# Patient Record
Sex: FEMALE | Race: WHITE | Employment: FULL TIME | URBAN - METROPOLITAN AREA
[De-identification: names, ages, dates, MRNs, and addresses within clinical notes are randomized per-mention and may not be internally consistent; named-entity substitution may affect disease eponyms.]

---

## 2017-03-14 ENCOUNTER — OFFICE VISIT (OUTPATIENT)
Dept: FAMILY MEDICINE CLINIC | Age: 57
End: 2017-03-14

## 2017-03-14 VITALS
DIASTOLIC BLOOD PRESSURE: 74 MMHG | OXYGEN SATURATION: 98 % | HEIGHT: 61 IN | RESPIRATION RATE: 20 BRPM | BODY MASS INDEX: 45.42 KG/M2 | HEART RATE: 84 BPM | TEMPERATURE: 99.2 F | WEIGHT: 240.6 LBS | SYSTOLIC BLOOD PRESSURE: 118 MMHG

## 2017-03-14 DIAGNOSIS — I10 ESSENTIAL HYPERTENSION WITH GOAL BLOOD PRESSURE LESS THAN 130/80: ICD-10-CM

## 2017-03-14 DIAGNOSIS — E78.2 MIXED HYPERLIPIDEMIA: ICD-10-CM

## 2017-03-14 DIAGNOSIS — L40.9 PSORIASIS: ICD-10-CM

## 2017-03-14 DIAGNOSIS — K21.9 GASTROESOPHAGEAL REFLUX DISEASE, ESOPHAGITIS PRESENCE NOT SPECIFIED: Chronic | ICD-10-CM

## 2017-03-14 DIAGNOSIS — L71.9 ROSACEA: ICD-10-CM

## 2017-03-14 DIAGNOSIS — Z79.899 ENCOUNTER FOR LONG-TERM (CURRENT) USE OF HIGH-RISK MEDICATION: ICD-10-CM

## 2017-03-14 DIAGNOSIS — E03.4 HYPOTHYROIDISM DUE TO ACQUIRED ATROPHY OF THYROID: Chronic | ICD-10-CM

## 2017-03-14 DIAGNOSIS — C50.211 BREAST CANCER OF UPPER-INNER QUADRANT OF RIGHT FEMALE BREAST (HCC): ICD-10-CM

## 2017-03-14 DIAGNOSIS — C50.211 BREAST CANCER OF UPPER-INNER QUADRANT OF RIGHT FEMALE BREAST (HCC): Primary | ICD-10-CM

## 2017-03-14 DIAGNOSIS — Z12.11 COLON CANCER SCREENING: ICD-10-CM

## 2017-03-14 DIAGNOSIS — G56.02 CARPAL TUNNEL SYNDROME OF LEFT WRIST: ICD-10-CM

## 2017-03-14 DIAGNOSIS — Z00.00 WELL WOMAN EXAM WITHOUT GYNECOLOGICAL EXAM: ICD-10-CM

## 2017-03-14 DIAGNOSIS — I10 ESSENTIAL HYPERTENSION: Chronic | ICD-10-CM

## 2017-03-14 RX ORDER — AMLODIPINE BESYLATE 10 MG/1
TABLET ORAL
Qty: 90 TAB | Refills: 1 | Status: SHIPPED | OUTPATIENT
Start: 2017-03-14 | End: 2017-09-05 | Stop reason: SDUPTHER

## 2017-03-14 RX ORDER — DOXYCYCLINE 40 MG/1
40 CAPSULE ORAL
Qty: 90 CAP | Refills: 1 | Status: SHIPPED | OUTPATIENT
Start: 2017-03-14 | End: 2017-03-14 | Stop reason: ALTCHOICE

## 2017-03-14 RX ORDER — LETROZOLE 2.5 MG/1
2.5 TABLET, FILM COATED ORAL DAILY
Qty: 90 TAB | Refills: 0 | Status: SHIPPED | OUTPATIENT
Start: 2017-03-14 | End: 2017-03-15 | Stop reason: SDUPTHER

## 2017-03-14 RX ORDER — TRIAMCINOLONE ACETONIDE 1 MG/G
CREAM TOPICAL 2 TIMES DAILY
Qty: 15 G | Refills: 0 | Status: SHIPPED | OUTPATIENT
Start: 2017-03-14 | End: 2017-09-10

## 2017-03-14 RX ORDER — LISINOPRIL AND HYDROCHLOROTHIAZIDE 12.5; 2 MG/1; MG/1
TABLET ORAL
Qty: 180 TAB | Refills: 1 | Status: SHIPPED | OUTPATIENT
Start: 2017-03-14 | End: 2017-09-05 | Stop reason: SDUPTHER

## 2017-03-14 RX ORDER — LEVOTHYROXINE SODIUM 25 UG/1
25 TABLET ORAL
Qty: 90 TAB | Refills: 1 | Status: SHIPPED | OUTPATIENT
Start: 2017-03-14 | End: 2017-09-05 | Stop reason: SDUPTHER

## 2017-03-14 RX ORDER — ATORVASTATIN CALCIUM 10 MG/1
TABLET, FILM COATED ORAL
Qty: 90 TAB | Refills: 1 | Status: SHIPPED | OUTPATIENT
Start: 2017-03-14 | End: 2017-09-05 | Stop reason: SDUPTHER

## 2017-03-14 RX ORDER — OMEPRAZOLE 20 MG/1
CAPSULE, DELAYED RELEASE ORAL
Qty: 90 CAP | Refills: 0 | Status: SHIPPED | OUTPATIENT
Start: 2017-03-14 | End: 2017-09-07 | Stop reason: SDUPTHER

## 2017-03-14 NOTE — PROGRESS NOTES
Subjective:     Chief Complaint   Patient presents with    Hypertension     6 month follow-up      She  is a 49 y.o. female who presents for evaluation of:     HTN & HLD - Doing well on current meds with no medication side effects noted. BPs at home are well controlled. No TIA's, no chest pain on exertion, no dyspnea on exertion, no swelling of ankles. Exercising - walking  Dieting - No  Smoking - No     Lab Results   Component Value Date/Time    Cholesterol, total 280 04/18/2016 12:13 PM    HDL Cholesterol 45 04/18/2016 12:13 PM    LDL, calculated 155 04/18/2016 12:13 PM    Triglyceride 399 04/18/2016 12:13 PM       Hypothyroidism - No sx. On very low dose of Synthroid. Working on weight loss. ROS:  Constitutional: negative except for fevers, chills and fatigue  Eyes: negative for visual disturbance  Respiratory: negative for cough or dyspnea on exertion  Cardiovascular: negative for chest pain, dyspnea, palpitations, fatigue  Gastrointestinal: negative for nausea, vomiting, change in bowel habits, diarrhea and abdominal pain  Genitourinary:negative for frequency and dysuria  Integument/breast: Rosacea improved with Doxycycline  Musculoskeletal: hx of CTS in R hand and required sgy. Now having numbness in left hand that feels just like it did in the right hand. Worse with typing. Tried stretches. Declines steroid injection and would like to discuss sgy at this point. Neurological: negative for headaches and dizziness  H/O - Prev declined Breast and Gyn exam despite discussing importance of this. Found to have breast cancer shortly after this through [address] in  7/2015. Sgy with Dr. Romy Calle and Dr. Renita Rees. Saw Dr. Fausto Castañeda and put on Femara but she has not f/u since then. Never had colonoscopy & ct to declines. Sent to Gyn and had pap 2 yrs ago with [employer].     Behavioral/Psych: negative for anxiety and depression     Objective:     Vitals:    03/14/17 1722   BP: 118/74   Pulse: 84 Resp: 20   Temp: 99.2 °F (37.3 °C)   TempSrc: Oral   SpO2: 98%   Weight: 240 lb 9.6 oz (109.1 kg)   Height: 5' 1\" (1.549 m)     Physical Examination:  General appearance - alert, well appearing, and in no distress  Eyes -sclera anicteric  Mouth - mucous membranes moist, pharynx normal without lesions  Neck - supple, no significant adenopathy, no thyromegaly, no bruits  Chest - clear to auscultation, no wheezes, rales or rhonchi, symmetric air entry  Heart - normal rate, regular rhythm, normal S1, S2, no murmurs, rubs, clicks or gallops  Neurological - alert, oriented, normal speech, no focal findings or movement disorder noted, L hand with mildly + phalen's  Extr - no edema  Skin - erythematous rosacea appearing facial rash, psoriatic patch on RLE    Past Medical History:   Diagnosis Date    Acid reflux     Adverse effect of anesthesia     sister has a lot of allergies and issues with anesthesia per pt    BPPV (benign paroxysmal positional vertigo)     Breast cancer (HCC) 7/2014    right    Environmental allergies     Gallstones     Negative workup in 2009    GERD (gastroesophageal reflux disease)     HTN (hypertension) 2/24/2015    Hypertension 9/19/2013    Hypertension     Hypoactive thyroid     Hypothyroidism 2/24/2015    Mixed hyperlipidemia 5/12/2016    Morbid obesity ([address]     Prediabetes 5/12/2016     Past Surgical History:   Procedure Laterality Date    ABDOMEN SURGERY PROC UNLISTED      Cholecystectomy    HX BREAST RECONSTRUCTION Bilateral 9/24/2015    BREAST RECONSTRUCTION [facility] at [facility]    HX BREAST RECONSTRUCTION Right 11/10/2015    REMOVAL RIGHT BREAST TISSUE EXPANDER performed by Sujata Logan MD at [facility] MAIN OR    HX BREAST RECONSTRUCTION Right 2/19/2016    PLACEMENT RIGHT TISSUE EXPANDER  performed by [facility]jata Logan MD at [facility] MAIN OR    HX BREAST RECONSTRUCTION Bilateral 10/21/2016    EXCHANGE BILATERAL TISSUE EXPANDER FOR BREAST IMPLANT performed by Josy Hall MD at [address]    Vaginal deliveries    HX MASTECTOMY Bilateral 9/24/2015    BILATERAL BREAST SKIN SPARING MASTECTOMY, RIGHT SENTINEL NODE BIOPSY/BILATERAL BREAST RECONSTRUCTION W/TISSUE EXPANDERS AND ALLODERM performed by Krystal Cagle MD at [facility]ULATORY OR    IN ERCP REMOVE CALCULI/DEBRIS BILIARY/PANCREAS DUCT  3/25/2016         IN ERCP W/SPHINCTEROTOMY/PAPILLOTOMY  3/25/2016          Current Outpatient Prescriptions on File Prior to Visit   Medication Sig Dispense Refill    amLODIPine (NORVASC) 10 mg tablet take 1 tablet by mouth once daily for HYPERTENSION 90 Tab 1    atorvastatin (LIPITOR) 10 mg tablet TAKE 1 TABLET BY MOUTH NIGHTLY 90 Tab 1    letrozole (FEMARA) 2.5 mg tablet Take 1 Tab by mouth daily. 90 Tab 0    levothyroxine (SYNTHROID) 25 mcg tablet Take 1 Tab by mouth Daily (before breakfast). 90 Tab 1    lisinopril-hydroCHLOROthiazide (PRINZIDE, ZESTORETIC) 20-12.5 mg per tablet take 2 tablets by mouth daily 180 Tab 1    omeprazole (PRILOSEC) 20 mg capsule take 1 capsule by mouth once daily as needed 90 Cap 0    triamcinolone acetonide (KENALOG) 0.1 % topical cream Apply  to affected area two (2) times a day for 180 days. 15 g 0     No current facility-administered medications on file prior to visit. No Known Allergies    Assessment/ Plan:   Kareen Mueller was seen today for hypertension. Diagnoses and all orders for this visit:    Breast cancer of upper-inner quadrant of right female breast ([address] - ct current tx. Refilling Femara today and asked pt to check in with Dr. No Lou again soon.   Also getting DEXA again    Essential hypertension - well controlled    Hypothyroidism due to acquired atrophy of thyroid - controlled    Mixed hyperlipidemia - at last check, LDL ok but Total and TG too high    Gastroesophageal reflux disease, esophagitis presence not specified     Carpal tunnel syndrome of left wrist - hx of this in past in right side requiring sgy, would like to see Hand Surgery    Encounter for long-term (current) use of high-risk medication - labs, see orders section. Checking CBC, CMP, TSH, & lipid panel    Colon cancer screening - agrees to FIT testing though declines colonoscopy at this point    Rosacea - Oracea daily to help    Psoriasis - TAC cream PRN    Obtaining records for pap smear from [facility]    I spent > 50% of the 40 min visit counseling and educating about cancer care follow up, high risk medication monitoring, skin care, preventative screenings, and med compliance. I have discussed the diagnosis with the patient and the intended plan as seen in the above orders. The patient has received an after-visit summary and questions were answered concerning future plans. I have discussed medication side effects and warnings with the patient as well. Follow-up Disposition:  Return in about 6 months (around 9/14/2017), or if symptoms worsen or fail to improve.

## 2017-03-14 NOTE — PROGRESS NOTES
Chief Complaint   Patient presents with    Hypertension     6 month follow-up   Medication refill  Room 4

## 2017-03-15 DIAGNOSIS — C50.211 BREAST CANCER OF UPPER-INNER QUADRANT OF RIGHT FEMALE BREAST (HCC): ICD-10-CM

## 2017-03-15 RX ORDER — LETROZOLE 2.5 MG/1
2.5 TABLET, FILM COATED ORAL DAILY
Qty: 90 TAB | Refills: 0 | Status: SHIPPED | OUTPATIENT
Start: 2017-03-15 | End: 2018-10-12

## 2017-03-15 NOTE — TELEPHONE ENCOUNTER
PER REGIS from Dr. Fartun Montgomery LETROZOLE 2.5MG [facility]) ONE TAB ONCE A DAY QUANTITY 90 REFILL 3.

## 2017-09-07 DIAGNOSIS — Z00.00 WELL WOMAN EXAM WITHOUT GYNECOLOGICAL EXAM: ICD-10-CM

## 2017-09-09 RX ORDER — OMEPRAZOLE 20 MG/1
CAPSULE, DELAYED RELEASE ORAL
Qty: 90 CAP | Refills: 0 | Status: SHIPPED | OUTPATIENT
Start: 2017-09-09 | End: 2018-01-05 | Stop reason: SDUPTHER

## 2017-10-10 ENCOUNTER — OFFICE VISIT (OUTPATIENT)
Dept: SURGERY | Age: 57
End: 2017-10-10

## 2017-10-10 VITALS
TEMPERATURE: 99 F | WEIGHT: 243.5 LBS | SYSTOLIC BLOOD PRESSURE: 150 MMHG | OXYGEN SATURATION: 96 % | DIASTOLIC BLOOD PRESSURE: 90 MMHG | HEART RATE: 100 BPM | BODY MASS INDEX: 45.97 KG/M2 | HEIGHT: 61 IN

## 2017-10-10 DIAGNOSIS — I10 ESSENTIAL HYPERTENSION, MALIGNANT: ICD-10-CM

## 2017-10-10 DIAGNOSIS — E66.01 MORBID OBESITY (HCC): Primary | ICD-10-CM

## 2017-10-10 NOTE — PROGRESS NOTES
1. Have you been to the ER, urgent care clinic since your last visit? Hospitalized since your last visit?  no    2. Have you seen or consulted any other health care providers outside of the [address] [place] [address] since your last visit? Include any pap smears or colon screening. Jerilyn Nichols  Body composition    female  56 y.o. Vitals:    10/10/17 1558   BP: 150/90   Pulse: 100   Temp: 99 °F (37.2 °C)   SpO2: 96%   Weight: 243 lb 8 oz (110.5 kg)   Height: 5' 1\" (1.549 m)     Body mass index is 46.01 kg/(m^2). Tita Mnotalvo Neck-  17 inches  Waist- 49.5 inches  Hips- 55 inches  Frame size-  medium

## 2017-10-10 NOTE — PROGRESS NOTES
Bariatric Surgery Consult    Yolanda Nichols is a 56 y.o. female with a history of morbid obesity. Her Height: 5' 1\" (154.9 cm), Weight: 243 lb 8 oz (110.5 kg). Body mass index is 46.01 kg/(m^2). She reports that she has been trying to lose weight for 10 years. Her maximum weight was 243 pounds. She has attended our online bariatric surgery information seminar. Miriam Brady wants to consider laparoscopic sleeve gastrectomy. Pt is self-referred. Dietary History:   The patient says that in the past, unsupervised diets and medifast have not resulted in real success. She states she maintained 180 lbs for a long time because she was active. She then had her gallbladder removed and ate a lot more. When asked why she was not able to achieve or maintain significant weight loss she replied, \"The Medi fast tasted awful and was too expensive. My metabolism is too slow\". Number of meals per day: 3  Portion size: moderate  Snacks: 0 times. Other dietary indiscretions:   Fried food--2-3 times weekly, e.g. tacos. Fast food--5 times weekly, e.g. Taco bell, Mcdonalds. Drinks-- 10 glasses of wine weekly   Sweets-- 0    Comorbidities:     Bariatric comorbidities present: hypertension, hypercholesterolemia, GERD and weight related arthopathies (knees), cholelthiasis, breast cancer    GERD: takes Omeprazole daily for 3-5 years, she has no trouble laying down, she has no fluid in her mouth, no previous endoscopies    STOPBANG questionnaire     Do you Snore loudly? N  Do you often feel Tired, fatigued, or sleepy during the daytime? N  Has anyone Observed you stop breathing during your sleep? N  Are you being treated for high blood Pressure? Y  BMI more than 35 kg/m2? Y  Age over 48years old? Y  Neck Circumference >16 inches? Y  Gender male?  N  ______________________________________    SCORE: 4    If YES to 0 - 2, low risk of sleep apnea  If YES to 3 - 4  intermediate risk of having sleep apnea  If YES to 5 - 8  high risk of having sleep apnea (or 2 + BMI 35 or Neck > 17\" or Male)     Ambulatory status: independent    The patient's reported level of exercise: not active.     Past Medical History:   Diagnosis Date    Acid reflux     Adverse effect of anesthesia     sister has a lot of allergies and issues with anesthesia per pt    BPPV (benign paroxysmal positional vertigo)     Breast cancer ([facility] 7/2014    right    Environmental allergies     Gallstones     Negative workup in 2009    GERD (gastroesophageal reflux disease)     HTN (hypertension) 2/24/2015    Hypertension 9/19/2013    Hypertension     Hypoactive thyroid     Hypothyroidism 2/24/2015    Mixed hyperlipidemia 5/12/2016    Morbid obesity ([facility]     Prediabetes 5/12/2016      Past Surgical History:   Procedure Laterality Date    ABDOMEN SURGERY PROC UNLISTED      Cholecystectomy    HX BREAST RECONSTRUCTION Bilateral 9/24/2015    BREAST RECONSTRUCTION performed by Gaby Jimenes MD at [facility] HX BREAST RECONSTRUCTION Right 11/10/2015    REMOVAL RIGHT BREAST TISSUE EXPANDER performed by Gaby Jimenes MD at [facility]"" MAIN OR    HX BREAST RECONSTRUCTION Right 2/19/2016    PLACEMENT RIGHT TISSUE EXPANDER  performed by Gaby Jimenes MD at \[facility]"" MAIN OR    HX BREAST RECONSTRUCTION Bilateral 10/21/2016    EXCHANGE BILATERAL TISSUE EXPANDER FOR BREAST IMPLANT performed by [facility]y [facility]enes MD at [facility] MAIN OR    HX CHOLECYSTECTOMY      [address]    Vaginal deliveries    HX MASTECTOMY Bilateral 9/24/2015    BILATERAL BREAST SKIN SPARING MASTECTOMY, RIGHT SENTINEL NODE BIOPSY/BILATERAL BREAST RECONSTRUCTION W/TISSUE EXPANDERS AND ALLODERM performed by Chandler Vásquez MD at \[facility]"" AMBULATORY OR    ME ERCP REMOVE CALCULI/DEBRIS BILIARY/PANCREAS DUCT  3/25/2016         ME ERCP W/SPHINCTEROTOMY/PAPILLOTOMY  3/25/2016           Current Outpatient Prescriptions   Medication Sig    omeprazole (PRILOSEC) 20 mg capsule take 1 capsule by mouth once daily if needed    amLODIPine (NORVASC) 10 mg tablet take 1 tablet by mouth once daily    atorvastatin (LIPITOR) 10 mg tablet take 1 tablet by mouth at bedtime    levothyroxine (SYNTHROID) 25 mcg tablet take 1 tablet by mouth once daily (BEFORE BREAKFAST)    lisinopril-hydroCHLOROthiazide (PRINZIDE, ZESTORETIC) 20-12.5 mg per tablet take 2 tablets by mouth once daily    letrozole (FEMARA) 2.5 mg tablet Take 1 Tab by mouth daily. No current facility-administered medications for this visit.        No Known Allergies  Social History   Substance Use Topics    Smoking status: Former Smoker     Packs/day: 2.00     Years: 30.00     Types: Cigarettes     Quit date: 1/1/2013    Smokeless tobacco: Former User    Alcohol use 3.6 oz/week     6 Glasses of wine per week      Comment: 2 glasses wine per week      Family History   Problem Relation Age of Onset    Heart Disease Mother     Stroke Father     Asthma Sister     Hypertension Sister       Review of Systems:  A comprehensive review of 12 systems was negative except for:   Constitutional:   Eyes:   Ears, nose, mouth, throat, and face:   Respiratory:   Cardiovascular: high blood pressure  Gastrointestinal: acid indigestion or heartburn   Genitourinary:   Integument/breast:   Hematologic/lymphatic:   Musculoskeletal:   Neurological:   Behvioral/Psych:     Objective:     Visit Vitals    /90    Pulse 100    Temp 99 °F (37.2 °C)    Ht 5' 1\" (1.549 m)    Wt 243 lb 8 oz (110.5 kg)    SpO2 96%    BMI 46.01 kg/m2      Physical Exam:    General:  alert, cooperative, no distress, appears stated age   Throat & Neck: no erythema or exudates noted and neck supple and symmetrical; no palpable masses  [place] class 4    Lungs:   clear to auscultation bilaterally   Heart:  Regular rate and rhythm   Abdomen:   abdomen is soft without significant tenderness, masses, organomegaly or guarding, normal bowel sounds, obese   Extremities: extremities normal, atraumatic, no cyanosis or edema   Skin: Normal.   Neuro: Mental status: Alert, oriented, thought content appropriate  Gait: Normal   Lymphatic: no adenopathy cervical or supraclavicular          Assessment:     Encounter Diagnoses     ICD-10-CM ICD-9-CM   1. Morbid obesity ([facility] E66.01 278.01      1. Morbid obesity (Body mass index is 46.01 kg/(m^2). ) with multiple comorbidities, hypertension, hypercholesterolemia, GERD, weight related arthopathies, cholelithiasis and cancer of breast.     The patient meets criteria established by the NIH for weight loss surgery candidates. Without weight reduction, co-morbidities will escalate as well as increase risk of early mortality. Our recommendation is the patient could be served with laparoscopic sleeve gastrectomy. I explained to the patient differences between laparoscopic gastric bypass, laparoscopic adjustable gastric banding, and laparoscopic vertical sleeve gastrectomy with respect to expected weight loss, resolution of comorbidities and risks. Ms. Magdalena Coffey has attended one our informational meetings and has seen our educational materials. She has requested Dr. Fernando Hernandez to perform her procedure. I reviewed the role for this procedure as a tool to help her achieve her weight loss goals. I reminded her that effective weight loss comes from lifelong adherence to changes in dietary choices, eating habits and exercise. Recommendation: We will request approval for laparoscopic sleeve gastrectomy. We recommend that the patient undergo the following evaluations prior to considering surgery:    Cardiology:   Dietician: yes  Gastroenterology: yes  Psychiatry/Psychology: yes  Pulmonology:   Sleep Medicine: recommended    Total face to face time with patient: 28 minutes. Greater than 50% of the time was spent in counseling.  4:19 PM - 4:47 PM.   Chart was written by Radha Lanza, as dictated by Yvon Bay MD.      Signed By: Fredy Lo MD     October 13, 2017 Cc: Shannan Barron MD

## 2017-10-17 DIAGNOSIS — E03.9 HYPOTHYROIDISM, UNSPECIFIED TYPE: Chronic | ICD-10-CM

## 2017-10-17 DIAGNOSIS — K21.9 GASTROESOPHAGEAL REFLUX DISEASE, ESOPHAGITIS PRESENCE NOT SPECIFIED: Primary | Chronic | ICD-10-CM

## 2017-10-17 DIAGNOSIS — E66.01 MORBID OBESITY DUE TO EXCESS CALORIES (HCC): ICD-10-CM

## 2017-10-26 ENCOUNTER — TELEPHONE (OUTPATIENT)
Dept: FAMILY MEDICINE CLINIC | Age: 57
End: 2017-10-26

## 2017-10-26 NOTE — TELEPHONE ENCOUNTER
Pt reports BP spike in the afternoons     187/98 one day this week       Best number to reach her is [phone]

## 2017-10-27 NOTE — TELEPHONE ENCOUNTER
Patient states she was on Clonidine and would take extra pill when she had BP elevation. She would like to know now that she is no longer on Clonidine what she needs to take or do to correct elevation.

## 2017-10-30 NOTE — TELEPHONE ENCOUNTER
Please have her recheck blood pressure and come in this week if possible. Continue Amlodipine and Lisinopril/HCTZ at max doses. If BP is escalating, would consider adding on B bl or another choice. Suspect stress/anxiety is escalating pressure and can be treated with other meds.

## 2017-10-31 NOTE — TELEPHONE ENCOUNTER
Patient advised to recheck BP and make appointment with Dr Baylee Kirkpatrick this week . Patient states she was just looking for medication but would call back to schedule appointment.

## 2018-01-05 DIAGNOSIS — Z00.00 WELL WOMAN EXAM WITHOUT GYNECOLOGICAL EXAM: ICD-10-CM

## 2018-01-05 RX ORDER — OMEPRAZOLE 20 MG/1
CAPSULE, DELAYED RELEASE ORAL
Qty: 90 CAP | Refills: 0 | Status: SHIPPED | OUTPATIENT
Start: 2018-01-05 | End: 2018-03-27 | Stop reason: SDUPTHER

## 2018-03-06 DIAGNOSIS — I10 ESSENTIAL HYPERTENSION WITH GOAL BLOOD PRESSURE LESS THAN 130/80: ICD-10-CM

## 2018-03-06 DIAGNOSIS — Z00.00 WELL WOMAN EXAM WITHOUT GYNECOLOGICAL EXAM: ICD-10-CM

## 2018-03-07 DIAGNOSIS — E78.2 MIXED HYPERLIPIDEMIA: ICD-10-CM

## 2018-03-07 RX ORDER — LEVOTHYROXINE SODIUM 25 UG/1
TABLET ORAL
Qty: 90 TAB | Refills: 1 | Status: SHIPPED | OUTPATIENT
Start: 2018-03-07 | End: 2018-03-27 | Stop reason: SDUPTHER

## 2018-03-07 RX ORDER — AMLODIPINE BESYLATE 10 MG/1
TABLET ORAL
Qty: 90 TAB | Refills: 1 | Status: SHIPPED | OUTPATIENT
Start: 2018-03-07 | End: 2018-03-27 | Stop reason: SDUPTHER

## 2018-03-07 RX ORDER — LISINOPRIL AND HYDROCHLOROTHIAZIDE 12.5; 2 MG/1; MG/1
TABLET ORAL
Qty: 180 TAB | Refills: 1 | Status: SHIPPED | OUTPATIENT
Start: 2018-03-07 | End: 2018-03-27 | Stop reason: SDUPTHER

## 2018-03-08 RX ORDER — ATORVASTATIN CALCIUM 10 MG/1
TABLET, FILM COATED ORAL
Qty: 90 TAB | Refills: 1 | Status: SHIPPED | OUTPATIENT
Start: 2018-03-08 | End: 2018-03-27 | Stop reason: SDUPTHER

## 2018-03-27 ENCOUNTER — OFFICE VISIT (OUTPATIENT)
Dept: FAMILY MEDICINE CLINIC | Age: 58
End: 2018-03-27

## 2018-03-27 VITALS
DIASTOLIC BLOOD PRESSURE: 66 MMHG | TEMPERATURE: 99.1 F | SYSTOLIC BLOOD PRESSURE: 129 MMHG | OXYGEN SATURATION: 99 % | WEIGHT: 249.6 LBS | HEART RATE: 80 BPM | RESPIRATION RATE: 16 BRPM | HEIGHT: 61 IN | BODY MASS INDEX: 47.13 KG/M2

## 2018-03-27 DIAGNOSIS — I10 ESSENTIAL HYPERTENSION: Chronic | ICD-10-CM

## 2018-03-27 DIAGNOSIS — E27.8 ADRENAL INCIDENTALOMA (HCC): ICD-10-CM

## 2018-03-27 DIAGNOSIS — E66.01 MORBID OBESITY DUE TO EXCESS CALORIES (HCC): ICD-10-CM

## 2018-03-27 DIAGNOSIS — F17.210 SMOKING GREATER THAN 30 PACK YEARS: ICD-10-CM

## 2018-03-27 DIAGNOSIS — Z00.00 WELL WOMAN EXAM WITHOUT GYNECOLOGICAL EXAM: Primary | ICD-10-CM

## 2018-03-27 DIAGNOSIS — E03.9 HYPOTHYROIDISM, UNSPECIFIED TYPE: Chronic | ICD-10-CM

## 2018-03-27 DIAGNOSIS — Z12.11 COLON CANCER SCREENING: ICD-10-CM

## 2018-03-27 DIAGNOSIS — E78.2 MIXED HYPERLIPIDEMIA: ICD-10-CM

## 2018-03-27 DIAGNOSIS — Z11.59 SCREENING FOR VIRAL DISEASE: ICD-10-CM

## 2018-03-27 RX ORDER — LEVOTHYROXINE SODIUM 25 UG/1
TABLET ORAL
Qty: 90 TAB | Refills: 1 | Status: SHIPPED | OUTPATIENT
Start: 2018-03-27 | End: 2019-03-11 | Stop reason: SDUPTHER

## 2018-03-27 RX ORDER — ATORVASTATIN CALCIUM 10 MG/1
TABLET, FILM COATED ORAL
Qty: 90 TAB | Refills: 1 | Status: SHIPPED | OUTPATIENT
Start: 2018-03-27 | End: 2018-04-07 | Stop reason: SDUPTHER

## 2018-03-27 RX ORDER — OMEPRAZOLE 20 MG/1
CAPSULE, DELAYED RELEASE ORAL
Qty: 90 CAP | Refills: 1 | Status: SHIPPED | OUTPATIENT
Start: 2018-03-27 | End: 2018-10-02 | Stop reason: SDUPTHER

## 2018-03-27 RX ORDER — LISINOPRIL AND HYDROCHLOROTHIAZIDE 12.5; 2 MG/1; MG/1
TABLET ORAL
Qty: 180 TAB | Refills: 1 | Status: SHIPPED | OUTPATIENT
Start: 2018-03-27 | End: 2019-03-11 | Stop reason: SDUPTHER

## 2018-03-27 RX ORDER — AMLODIPINE BESYLATE 10 MG/1
TABLET ORAL
Qty: 90 TAB | Refills: 1 | Status: SHIPPED | OUTPATIENT
Start: 2018-03-27 | End: 2019-03-11 | Stop reason: SDUPTHER

## 2018-03-27 NOTE — PROGRESS NOTES
Subjective:     Chief Complaint   Patient presents with    Annual Wellness Visit      She  is a 56 y.o. female who presents for evaluation of: CPE    Never had colonoscopy. LMP was > 10 yrs ago. Last pap done at the [facility]. Feeling well otherwise. Not seeing eye doctor or dentist.  She previously was not seen an OB/GYN and was not up-to-date on her Paps remain was and then subsequently was found to have breast cancer on mammogram and ultrasound. HTN - Doing well on current meds with no medication side effects noted. BPs at home are well controlled. No TIA's, no chest pain on exertion, no dyspnea on exertion, no swelling of ankles. Exercising -no  Dieting -no  Smoking - No, previously 2 pack per day smoker ×30 years and quit in 2013    Hypothyroidism - No sx. On very low dose of Synthroid. Working on weight loss. ROS:  Constitutional: negative except for fevers, chills and fatigue  Eyes: negative for visual disturbance  Respiratory: negative for cough or dyspnea on exertion  Cardiovascular: negative for chest pain, dyspnea, palpitations, fatigue  Gastrointestinal: negative for nausea, vomiting, change in bowel habits, diarrhea and abdominal pain  Genitourinary:negative for frequency and dysuria  Integument/breast: rash as above.   Musculoskeletal:negative for myalgias and muscle weakness  Neurological: negative for headaches and dizziness  H/O - Hx right breast cancer 7/2015 - sgy with Dr. Juanis Hess and Dr. Elizabeth Flannery  Behavioral/Psych: negative for anxiety and depression     Objective:     Vitals:    03/27/18 1433   BP: 129/66   Pulse: 80   Resp: 16   Temp: 99.1 °F (37.3 °C)   TempSrc: Oral   SpO2: 99%   Weight: 249 lb 9.6 oz (113.2 kg)   Height: 5' 1\" (1.549 m)     Physical Examination:  General appearance - alert, well appearing, and in no distress  Eyes -sclera anicteric  Ears bilateral normal TMs, right ear canal tight  Mouth - mucous membranes moist, pharynx normal without lesions  Neck - supple, no significant adenopathy, no thyromegaly, no bruits  Chest - clear to auscultation, no wheezes, rales or rhonchi, symmetric air entry  Heart - normal rate, regular rhythm, normal S1, S2, no murmurs, rubs, clicks or gallops  Abdomen - soft, nontender, nondistended, no masses or organomegaly  Breasts & Pelvic -declines, seeing OB/GYN for this  Neurological - alert, oriented, normal speech, no focal findings or movement disorder noted  Extr - no edema  Skin - erythematous rosacea appearing facial rash    Past Medical History:   Diagnosis Date    Acid reflux     Adverse effect of anesthesia     sister has a lot of allergies and issues with anesthesia per pt    BPPV (benign paroxysmal positional vertigo)     Breast cancer (HCC) 7/2014    right    Environmental allergies     Gallstones     Negative workup in 2009    GERD (gastroesophageal reflux disease)     HTN (hypertension) 2/24/2015    Hypertension 9/19/2013    Hypertension     Hypoactive thyroid     Hypothyroidism 2/24/2015    Mixed hyperlipidemia 5/12/2016    Morbid obesity ([address]     Prediabetes 5/12/2016     Past Surgical History:   Procedure Laterality Date    ABDOMEN SURGERY PROC UNLISTED      Cholecystectomy    HX BREAST RECONSTRUCTION Bilateral 9/24/2015    BREAST RECONSTRUCTION performed by Deja Cardoza MD at [facility] AMBULATORY OR    HX BREAST RECONSTRUCTION Right 11/10/2015    REMOVAL RIGHT BREAST TISSUE EXPANDER performed by Deja Cardoza MD at [facility] MAIN OR    HX BREAST RECONSTRUCTION Right 2/19/2016    PLACEMENT RIGHT TISSUE EXPANDER  performed by Deja Cardoza MD at [facility] MAIN OR    HX BREAST RECONSTRUCTION Bilateral 10/21/2016    EXCHANGE BILATERAL TISSUE EXPANDER FOR BREAST IMPLANT performed by Deja Cardoza MD at [facility] MAIN OR    HX CHOLECYSTECTOMY      HX GYN  1988, 1983    Vaginal deliveries    HX MASTECTOMY Bilateral 9/24/2015    BILATERAL BREAST SKIN SPARING MASTECTOMY, RIGHT SENTINEL NODE BIOPSY/BILATERAL BREAST RECONSTRUCTION W/TISSUE EXPANDERS AND ALLODERM performed by Robinson Albright MD at [facility] AMBULATORY OR    PA ERCP REMOVE CALCULI/DEBRIS BILIARY/PANCREAS DUCT  3/25/2016         PA ERCP W/SPHINCTEROTOMY/PAPILLOTOMY  3/25/2016          Current Outpatient Prescriptions on File Prior to Visit   Medication Sig Dispense Refill    letrozole (FEMARA) 2.5 mg tablet Take 1 Tab by mouth daily. 90 Tab 0     No current facility-administered medications on file prior to visit. No Known Allergies    Assessment/ Plan:   Diagnoses and all orders for this visit:    1. Well woman exam without gynecological exam  -     CBC W/O DIFF  -     HEMOGLOBIN A1C WITH EAG  -     LIPID PANEL  -     METABOLIC PANEL, COMPREHENSIVE  -     TSH 3RD GENERATION    2. Mixed hyperlipidemiastable  -     atorvastatin (LIPITOR) 10 mg tablet; take 1 tablet by mouth at bedtime  -     HEMOGLOBIN A1C WITH EAG  -     LIPID PANEL  -     METABOLIC PANEL, COMPREHENSIVE  -     TSH 3RD GENERATION    3. Hypothyroidism, unspecified type  -     levothyroxine (SYNTHROID) 25 mcg tablet; take 1 tablet by mouth once daily BEFORE BREAKFAST  -     HEMOGLOBIN A1C WITH EAG  -     LIPID PANEL  -     TSH 3RD GENERATION    4. Morbid obesity due to excess calories ([facility]  Discussed the patient's BMI. The BMI follow up plan is as follows:   dietary management education, guidance, and counseling  encourage exercise  monitor weight  prescribed dietary intake  -     TSH 3RD GENERATION    5. Essential hypertensionwell controlled  -     amLODIPine (NORVASC) 10 mg tablet; take 1 tablet by mouth once daily  -     lisinopril-hydroCHLOROthiazide (PRINZIDE, ZESTORETIC) 20-12.5 mg per tablet; take 2 tablets by mouth once daily  -     METABOLIC PANEL, COMPREHENSIVE    6. Adrenal incidentaloma (HCC)getting CT with adrenal mass protocol  -     CT ABD WO CONT; Future    7. Screening for viral disease  -     HEPATITIS C AB    8.  Colon cancer screening  -     OCCULT BLOOD, IMMUNOASSAY (FIT)    9. Smoking greater than 30 pack years -smoked nearly 2 packs per day for nearly 30 years and quit in 2013. She is asymptomatic but declines getting the low-dose chest CT for lung cancer screening at this point. We discussed that she may consider it in the future. Other orders  -     omeprazole (PRILOSEC) 20 mg capsule; take 1 capsule by mouth once daily if needed    I have discussed the diagnosis with the patient and the intended plan as seen in the above orders. The patient has received an after-visit summary and questions were answered concerning future plans. I have discussed medication side effects and warnings with the patient as well. Follow-up Disposition:  Return in about 6 months (around 9/27/2018), or if symptoms worsen or fail to improve, for Regular Follow up.

## 2018-03-27 NOTE — PROGRESS NOTES
Chief Complaint   Patient presents with   [address] [place] [address] Annual Wellness Visit   1. Have you been to the ER, urgent care clinic since your last visit? Hospitalized since your last visit? No    2. Have you seen or consulted any other health care providers outside of the [address] [place] [address] since your last visit? Include any pap smears or colon screening.  No   Room #4

## 2018-03-27 NOTE — MR AVS SNAPSHOT
[address]. 
[phone] Patient: Batsheva Nichols MRN: [id number] MNS:9[birth year] Visit Information Date & Time Provider Department Dept. Phone Encounter #  
 3/27/2018  2:30 PM Abdias Frank MD [facility] at [address] [place] [address] Road [id number] Follow-up Instructions Return in about 6 months (around 9/27/2018), or if symptoms worsen or fail to improve, for Regular Follow up. Upcoming Health Maintenance Date Due Hepatitis C Screening 1960 FOBT Q 1 YEAR, 18+ 4/11/1980 PAP AKA CERVICAL CYTOLOGY 4/11/1983 Pneumococcal 19-64 Highest Risk (1 of 3 - PCV13) 9/27/2018* DTaP/Tdap/Td series (1 - Tdap) 9/27/2018* Influenza Age 5 to Adult 9/27/2018* *Topic was postponed. The date shown is not the original due date. Allergies as of 3/27/2018  Review Complete On: 3/27/2018 By: Abdias Frank MD  
 No Known Allergies Current Immunizations  Reviewed on 5/12/2016 No immunizations on file. Not reviewed this visit You Were Diagnosed With   
  
 Codes Comments Well woman exam without gynecological exam    -  Primary ICD-10-CM: Z00.00 ICD-9-CM: V70.0 Mixed hyperlipidemia     ICD-10-CM: E78.2 ICD-9-CM: 272.2 Hypothyroidism, unspecified type     ICD-10-CM: E03.9 ICD-9-CM: 244.9 Morbid obesity due to excess calories (HCC)     ICD-10-CM: E66.01 
ICD-9-CM: 278.01 Essential hypertension     ICD-10-CM: I10 
ICD-9-CM: 401.9 Adrenal incidentaloma ([facility] [address]     ICD-10-CM: E27.8 ICD-9-CM: 255.8 Screening for viral disease     ICD-10-CM: Z11.59 
ICD-9-CM: V73.99 Vitals BP Pulse Temp Resp Height(growth percentile) Weight(growth percentile) 129/66 (BP 1 Location: Left arm, BP Patient Position: Sitting) 80 99.1 °F (37.3 °C) (Oral) 16 5' 1\" (1.549 m) 249 lb 9.6 oz (113.2 kg) SpO2 BMI OB Status Smoking Status 99% 47.16 kg/m2 Postmenopausal Former Smoker Vitals History BMI and BSA Data Body Mass Index Body Surface Area  
 47.16 kg/m 2 2.21 m 2 [facility]erred [facility]acy [facility] [address] [facility] [phone] Your Updated Medication List  
  
   
This list is accurate as of 3/27/18  3:10 PM.  Always use your most recent med list. amLODIPine 10 mg tablet Commonly known as:  NORVASC  
take 1 tablet by mouth once daily  
  
 atorvastatin 10 mg tablet Commonly known as:  LIPITOR  
take 1 tablet by mouth at bedtime  
  
 letrozole 2.5 mg tablet Commonly known as:  [facility] Take 1 Tab by mouth daily. levothyroxine 25 mcg tablet Commonly known as:  SYNTHROID  
take 1 tablet by mouth once daily BEFORE BREAKFAST  
  
 lisinopril-hydroCHLOROthiazide 20-12.5 mg per tablet Commonly known as:  PRINZIDE, ZESTORETIC  
take 2 tablets by mouth once daily  
  
 omeprazole 20 mg capsule Commonly known as:  PRILOSEC  
take 1 capsule by mouth once daily if needed Prescriptions Sent to Pharmacy Refills  
 atorvastatin (LIPITOR) 10 mg tablet 1 Sig: take 1 tablet by mouth at bedtime Class: Normal  
 Pharmacy: [address] [facility] [address] Ph #: [phone]  
 amLODIPine (NORVASC) 10 mg tablet 1 Sig: take 1 tablet by mouth once daily Class: Normal  
 [facility] [address] Ph #: [phone]  
 lisinopril-hydroCHLOROthiazide (PRINZIDE, ZESTORETIC) 20-12.5 mg per tablet 1 Sig: take 2 tablets by mouth once daily Class: Normal  
 Pharmacy: [address] [facility] [address] Ph #: [phone]  
 levothyroxine (SYNTHROID) 25 mcg tablet 1 Sig: take 1 tablet by mouth once daily BEFORE BREAKFAST  Class: Normal  
 [facility] [address] [facility] Ph #: [phone]  
 omeprazole (PRILOSEC) 20 mg capsule 1 Sig: take 1 capsule by mouth once daily if needed Class: Normal  
 [facility] [address] Ph #: [phone] We Performed the Following CBC W/O DIFF [52929 CPT(R)] HEMOGLOBIN A1C WITH EAG [66953 CPT(R)] HEPATITIS C AB [58198 CPT(R)] LIPID PANEL [21070 CPT(R)] METABOLIC PANEL, COMPREHENSIVE [21816 CPT(R)] TSH 3RD GENERATION [13335 CPT(R)] Follow-up Instructions Return in about 6 months (around 9/27/2018), or if symptoms worsen or fail to improve, for Regular Follow up. To-Do List   
 03/27/2018 Imaging:  CT ABD WO CONT Referral Information Referral ID Referred By Referred To  
  
 [id number] [facility] Not Available Visits Status Start Date End Date 1 New Request 3/27/18 3/27/19 If your referral has a status of pending review or denied, additional information will be sent to support the outcome of this decision. Introducing [address]! Dear Urszula Patel: 
Thank you for requesting a [employer] account. Our records indicate that you already have an active [employer] account. You can access your account anytime at [email or web address] Did you know that you can access your hospital and ER discharge instructions at any time in [employer]? You can also review all of your test results from your hospital stay or ER visit. Additional Information If you have questions, please visit the Frequently Asked Questions section of the [employer] website at [email or web address] Remember, [employer] is NOT to be used for urgent needs. For medical emergencies, dial 911. Now available from your iPhone and Android! Please provide this summary of care documentation to your next provider. Your primary care clinician is listed as Carlos Galvan. If you have any questions after today's visit, please call [phone].

## 2018-04-07 DIAGNOSIS — E78.2 MIXED HYPERLIPIDEMIA: ICD-10-CM

## 2018-04-07 RX ORDER — ATORVASTATIN CALCIUM 10 MG/1
TABLET, FILM COATED ORAL
Qty: 90 TAB | Refills: 0 | Status: SHIPPED | OUTPATIENT
Start: 2018-04-07 | End: 2019-03-11 | Stop reason: SDUPTHER

## 2018-04-18 LAB
ALBUMIN SERPL-MCNC: 4.6 G/DL (ref 3.5–5.5)
ALBUMIN/GLOB SERPL: 2.2 {RATIO} (ref 1.2–2.2)
ALP SERPL-CCNC: 64 IU/L (ref 39–117)
ALT SERPL-CCNC: 22 IU/L (ref 0–32)
AST SERPL-CCNC: 21 IU/L (ref 0–40)
BILIRUB SERPL-MCNC: <0.2 MG/DL (ref 0–1.2)
BUN SERPL-MCNC: 12 MG/DL (ref 6–24)
BUN/CREAT SERPL: 18 (ref 9–23)
CALCIUM SERPL-MCNC: 9.7 MG/DL (ref 8.7–10.2)
CHLORIDE SERPL-SCNC: 103 MMOL/L (ref 96–106)
CHOLEST SERPL-MCNC: 161 MG/DL (ref 100–199)
CO2 SERPL-SCNC: 22 MMOL/L (ref 18–29)
CREAT SERPL-MCNC: 0.68 MG/DL (ref 0.57–1)
ERYTHROCYTE [DISTWIDTH] IN BLOOD BY AUTOMATED COUNT: 13.2 % (ref 12.3–15.4)
EST. AVERAGE GLUCOSE BLD GHB EST-MCNC: 120 MG/DL
GFR SERPLBLD CREATININE-BSD FMLA CKD-EPI: 113 ML/MIN/1.73
GFR SERPLBLD CREATININE-BSD FMLA CKD-EPI: 98 ML/MIN/1.73
GLOBULIN SER CALC-MCNC: 2.1 G/DL (ref 1.5–4.5)
GLUCOSE SERPL-MCNC: 146 MG/DL (ref 65–99)
HBA1C MFR BLD: 5.8 % (ref 4.8–5.6)
HCT VFR BLD AUTO: 35.3 % (ref 34–46.6)
HCV AB S/CO SERPL IA: <0.1 S/CO RATIO (ref 0–0.9)
HDLC SERPL-MCNC: 31 MG/DL
HGB BLD-MCNC: 11.9 G/DL (ref 11.1–15.9)
LDLC SERPL CALC-MCNC: 52 MG/DL (ref 0–99)
MCH RBC QN AUTO: 31.8 PG (ref 26.6–33)
MCHC RBC AUTO-ENTMCNC: 33.7 G/DL (ref 31.5–35.7)
MCV RBC AUTO: 94 FL (ref 79–97)
PLATELET # BLD AUTO: 340 X10E3/UL (ref 150–379)
POTASSIUM SERPL-SCNC: 3.9 MMOL/L (ref 3.5–5.2)
PROT SERPL-MCNC: 6.7 G/DL (ref 6–8.5)
RBC # BLD AUTO: 3.74 X10E6/UL (ref 3.77–5.28)
SODIUM SERPL-SCNC: 145 MMOL/L (ref 134–144)
TRIGL SERPL-MCNC: 392 MG/DL (ref 0–149)
TSH SERPL DL<=0.005 MIU/L-ACNC: 4.08 UIU/ML (ref 0.45–4.5)
VLDLC SERPL CALC-MCNC: 78 MG/DL (ref 5–40)
WBC # BLD AUTO: 7.7 X10E3/UL (ref 3.4–10.8)

## 2018-10-12 ENCOUNTER — HOSPITAL ENCOUNTER (OUTPATIENT)
Dept: PREADMISSION TESTING | Age: 58
Discharge: HOME | End: 2018-10-12
Payer: COMMERCIAL

## 2018-10-12 VITALS
RESPIRATION RATE: 20 BRPM | WEIGHT: 248.46 LBS | DIASTOLIC BLOOD PRESSURE: 83 MMHG | OXYGEN SATURATION: 99 % | TEMPERATURE: 98.3 F | HEIGHT: 61 IN | BODY MASS INDEX: 46.91 KG/M2 | SYSTOLIC BLOOD PRESSURE: 157 MMHG | HEART RATE: 83 BPM

## 2018-10-12 LAB
ANION GAP SERPL CALC-SCNC: 5 MMOL/L (ref 5–15)
ATRIAL RATE: 76 BPM
BUN SERPL-MCNC: 17 MG/DL (ref 6–20)
BUN/CREAT SERPL: 17 (ref 12–20)
CALCIUM SERPL-MCNC: 9.6 MG/DL (ref 8.5–10.1)
CALCULATED P AXIS, ECG09: 65 DEGREES
CALCULATED R AXIS, ECG10: 46 DEGREES
CALCULATED T AXIS, ECG11: 51 DEGREES
CHLORIDE SERPL-SCNC: 103 MMOL/L (ref 97–108)
CO2 SERPL-SCNC: 27 MMOL/L (ref 21–32)
CREAT SERPL-MCNC: 1 MG/DL (ref 0.55–1.02)
DIAGNOSIS, 93000: NORMAL
ERYTHROCYTE [DISTWIDTH] IN BLOOD BY AUTOMATED COUNT: 12 % (ref 11.5–14.5)
GLUCOSE SERPL-MCNC: 116 MG/DL (ref 65–100)
HCT VFR BLD AUTO: 38.4 % (ref 35–47)
HGB BLD-MCNC: 13.3 G/DL (ref 11.5–16)
MCH RBC QN AUTO: 32.6 PG (ref 26–34)
MCHC RBC AUTO-ENTMCNC: 34.6 G/DL (ref 30–36.5)
MCV RBC AUTO: 94.1 FL (ref 80–99)
NRBC # BLD: 0 K/UL (ref 0–0.01)
NRBC BLD-RTO: 0 PER 100 WBC
P-R INTERVAL, ECG05: 146 MS
PLATELET # BLD AUTO: 340 K/UL (ref 150–400)
PMV BLD AUTO: 9.9 FL (ref 8.9–12.9)
POTASSIUM SERPL-SCNC: 3.9 MMOL/L (ref 3.5–5.1)
Q-T INTERVAL, ECG07: 388 MS
QRS DURATION, ECG06: 86 MS
QTC CALCULATION (BEZET), ECG08: 436 MS
RBC # BLD AUTO: 4.08 M/UL (ref 3.8–5.2)
SODIUM SERPL-SCNC: 135 MMOL/L (ref 136–145)
VENTRICULAR RATE, ECG03: 76 BPM
WBC # BLD AUTO: 8.3 K/UL (ref 3.6–11)

## 2018-10-12 PROCEDURE — 93005 ELECTROCARDIOGRAM TRACING: CPT

## 2018-10-12 PROCEDURE — 85027 COMPLETE CBC AUTOMATED: CPT | Performed by: PLASTIC SURGERY

## 2018-10-12 PROCEDURE — 80048 BASIC METABOLIC PNL TOTAL CA: CPT | Performed by: PLASTIC SURGERY

## 2018-10-12 PROCEDURE — 36415 COLL VENOUS BLD VENIPUNCTURE: CPT | Performed by: PLASTIC SURGERY

## 2018-10-12 RX ORDER — OMEPRAZOLE 20 MG/1
20 CAPSULE, DELAYED RELEASE ORAL
COMMUNITY
End: 2019-01-04 | Stop reason: SDUPTHER

## 2018-10-12 RX ORDER — DIPHENHYDRAMINE HCL 25 MG
25 CAPSULE ORAL DAILY
COMMUNITY
End: 2020-05-04

## 2018-10-12 RX ORDER — SODIUM CHLORIDE, SODIUM LACTATE, POTASSIUM CHLORIDE, CALCIUM CHLORIDE 600; 310; 30; 20 MG/100ML; MG/100ML; MG/100ML; MG/100ML
25 INJECTION, SOLUTION INTRAVENOUS CONTINUOUS
Status: CANCELLED | OUTPATIENT
Start: 2018-10-19

## 2018-10-12 NOTE — PERIOP NOTES
[facility]  Preoperative Instructions        Surgery Date 10/19/18          Time of Kody Márquez  Contact # [phone] cell    1. On the day of your surgery, please report to the Surgical Services Registration Desk and sign in at your designated time. The Surgery Center is located to the right of the Emergency Room. 2. You must have someone with you to drive you home. You should not drive a car for 24 hours following surgery. Please make arrangements for a friend or family member to stay with you for the first 24 hours after your surgery. 3. Do not have anything to eat or drink (including water, gum, mints, coffee, juice) after midnight 10/18/18  . ? This may not apply to medications prescribed by your physician. ?(Please note below the special instructions with medications to take the morning of your procedure.)    4. We recommend you do not drink any alcoholic beverages for 24 hours before and after your surgery. 5. Contact your surgeons office for instructions on the following medications: non-steroidal anti-inflammatory drugs (i.e. Advil, Aleve), vitamins, and supplements. (Some surgeons will want you to stop these medications prior to surgery and others may allow you to take them)  **If you are currently taking Plavix, Coumadin, Aspirin and/or other blood-thinning agents, contact your surgeon for instructions. ** Your surgeon will partner with the physician prescribing these medications to determine if it is safe to stop or if you need to continue taking. Please do not stop taking these medications without instructions from your surgeon    6. Wear comfortable clothes. Wear glasses instead of contacts. Do not bring any money or jewelry. Please bring picture ID, insurance card, and any prearranged co-payment or hospital payment. Do not wear make-up, particularly mascara the morning of your surgery.   Do not wear nail polish, particularly if you are having foot /hand surgery. Wear your hair loose or down, no ponytails, buns, shay pins or clips. All body piercings must be removed. Please shower with antibacterial soap for three consecutive days before and on the morning of surgery, but do not apply any lotions, powders or deodorants after the shower on the day of surgery. Please use a fresh towels after each shower. Please sleep in clean clothes and change bed linens the night before surgery. Please do not shave for 48 hours prior to surgery. Shaving of the face is acceptable. 7. You should understand that if you do not follow these instructions your surgery may be cancelled. If your physical condition changes (I.e. fever, cold or flu) please contact your surgeon as soon as possible. 8. It is important that you be on time. If a situation occurs where you may be late, please call [phone] (OR Holding Area). 9. If you have any questions and or problems, please call [phone] (Pre-admission Testing). 10. Your surgery time may be subject to change. You will receive a phone call the evening prior if your time changes. 11.  If having outpatient surgery, you must have someone to drive you here, stay with you during the duration of your stay, and to drive you home at time of discharge. 12.   In an effort to improve the efficiency, privacy, and safety for all of our Pre-op patients visitors are not allowed in the Holding area. Once you arrive and are registered your family/visitors will be asked to remain in the waiting room. The Pre-op staff will get you from the Surgical Waiting Area and will explain to you and your family/visitors that the Pre-op phase is beginning. The staff will answer any questions and provide instructions for tracking of the patient, by use of the existing tracking number and color-coded status board in the waiting room.   At this time the staff will also ask for your designated spokesperson information in the event that the physician or staff need to provide an update or obtain any pertinent information. The designated spokesperson will be notified if the physician needs to speak to family during the pre-operative phase. If at any time your family/visitors has questions or concerns they may approach the volunteer desk in the waiting area for assistance. Special Instructions:    MEDICATIONS TO TAKE THE MORNING OF SURGERY WITH A SIP OF WATER:morning medications except lisinopril      I understand a pre-operative phone call will be made to verify my surgery time. In the event that I am not available, I give permission for a message to be left on my answering service and/or with another person?   yes          ___________________      __________   _________    (Signature of Patient)             (Witness)                (Date and Time)

## 2018-10-19 ENCOUNTER — HOSPITAL ENCOUNTER (OUTPATIENT)
Age: 58
Setting detail: OUTPATIENT SURGERY
Discharge: HOME | End: 2018-10-19
Attending: PLASTIC SURGERY | Admitting: PLASTIC SURGERY
Payer: COMMERCIAL

## 2018-10-19 ENCOUNTER — ANESTHESIA (OUTPATIENT)
Dept: SURGERY | Age: 58
End: 2018-10-19
Payer: COMMERCIAL

## 2018-10-19 ENCOUNTER — ANESTHESIA EVENT (OUTPATIENT)
Dept: SURGERY | Age: 58
End: 2018-10-19
Payer: COMMERCIAL

## 2018-10-19 VITALS
WEIGHT: 249.56 LBS | DIASTOLIC BLOOD PRESSURE: 65 MMHG | OXYGEN SATURATION: 100 % | HEIGHT: 61 IN | HEART RATE: 75 BPM | RESPIRATION RATE: 16 BRPM | SYSTOLIC BLOOD PRESSURE: 130 MMHG | BODY MASS INDEX: 47.12 KG/M2 | TEMPERATURE: 97.5 F

## 2018-10-19 PROCEDURE — 77030032490 HC SLV COMPR SCD KNE COVD -B: Performed by: PLASTIC SURGERY

## 2018-10-19 PROCEDURE — 88360 TUMOR IMMUNOHISTOCHEM/MANUAL: CPT | Performed by: PLASTIC SURGERY

## 2018-10-19 PROCEDURE — 76010000138 HC OR TIME 0.5 TO 1 HR: Performed by: PLASTIC SURGERY

## 2018-10-19 PROCEDURE — 76210000021 HC REC RM PH II 0.5 TO 1 HR: Performed by: PLASTIC SURGERY

## 2018-10-19 PROCEDURE — 76210000006 HC OR PH I REC 0.5 TO 1 HR: Performed by: PLASTIC SURGERY

## 2018-10-19 PROCEDURE — 74011000250 HC RX REV CODE- 250: Performed by: PLASTIC SURGERY

## 2018-10-19 PROCEDURE — 77030011640 HC PAD GRND REM COVD -A: Performed by: PLASTIC SURGERY

## 2018-10-19 PROCEDURE — 74011250636 HC RX REV CODE- 250/636

## 2018-10-19 PROCEDURE — 88305 TISSUE EXAM BY PATHOLOGIST: CPT | Performed by: PLASTIC SURGERY

## 2018-10-19 PROCEDURE — 76060000032 HC ANESTHESIA 0.5 TO 1 HR: Performed by: PLASTIC SURGERY

## 2018-10-19 PROCEDURE — 77030018836 HC SOL IRR NACL ICUM -A: Performed by: PLASTIC SURGERY

## 2018-10-19 PROCEDURE — 74011250636 HC RX REV CODE- 250/636: Performed by: PLASTIC SURGERY

## 2018-10-19 PROCEDURE — 77030020782 HC GWN BAIR PAWS FLX 3M -B

## 2018-10-19 PROCEDURE — 74011250636 HC RX REV CODE- 250/636: Performed by: ANESTHESIOLOGY

## 2018-10-19 RX ORDER — HYDROMORPHONE HYDROCHLORIDE 1 MG/ML
.2-.5 INJECTION, SOLUTION INTRAMUSCULAR; INTRAVENOUS; SUBCUTANEOUS
Status: DISCONTINUED | OUTPATIENT
Start: 2018-10-19 | End: 2018-10-19 | Stop reason: HOSPADM

## 2018-10-19 RX ORDER — PROPOFOL 10 MG/ML
INJECTION, EMULSION INTRAVENOUS AS NEEDED
Status: DISCONTINUED | OUTPATIENT
Start: 2018-10-19 | End: 2018-10-19 | Stop reason: HOSPADM

## 2018-10-19 RX ORDER — DEXAMETHASONE SODIUM PHOSPHATE 4 MG/ML
INJECTION, SOLUTION INTRA-ARTICULAR; INTRALESIONAL; INTRAMUSCULAR; INTRAVENOUS; SOFT TISSUE AS NEEDED
Status: DISCONTINUED | OUTPATIENT
Start: 2018-10-19 | End: 2018-10-19 | Stop reason: HOSPADM

## 2018-10-19 RX ORDER — SODIUM CHLORIDE, SODIUM LACTATE, POTASSIUM CHLORIDE, CALCIUM CHLORIDE 600; 310; 30; 20 MG/100ML; MG/100ML; MG/100ML; MG/100ML
25 INJECTION, SOLUTION INTRAVENOUS CONTINUOUS
Status: DISCONTINUED | OUTPATIENT
Start: 2018-10-19 | End: 2018-10-19 | Stop reason: HOSPADM

## 2018-10-19 RX ORDER — SODIUM CHLORIDE 0.9 % (FLUSH) 0.9 %
5-10 SYRINGE (ML) INJECTION EVERY 8 HOURS
Status: DISCONTINUED | OUTPATIENT
Start: 2018-10-19 | End: 2018-10-19 | Stop reason: HOSPADM

## 2018-10-19 RX ORDER — ACETAMINOPHEN 10 MG/ML
INJECTION, SOLUTION INTRAVENOUS AS NEEDED
Status: DISCONTINUED | OUTPATIENT
Start: 2018-10-19 | End: 2018-10-19 | Stop reason: HOSPADM

## 2018-10-19 RX ORDER — PROPOFOL 10 MG/ML
INJECTION, EMULSION INTRAVENOUS
Status: DISCONTINUED | OUTPATIENT
Start: 2018-10-19 | End: 2018-10-19 | Stop reason: HOSPADM

## 2018-10-19 RX ORDER — FENTANYL CITRATE 50 UG/ML
INJECTION, SOLUTION INTRAMUSCULAR; INTRAVENOUS AS NEEDED
Status: DISCONTINUED | OUTPATIENT
Start: 2018-10-19 | End: 2018-10-19 | Stop reason: HOSPADM

## 2018-10-19 RX ORDER — MORPHINE SULFATE 10 MG/ML
2 INJECTION, SOLUTION INTRAMUSCULAR; INTRAVENOUS
Status: DISCONTINUED | OUTPATIENT
Start: 2018-10-19 | End: 2018-10-19 | Stop reason: HOSPADM

## 2018-10-19 RX ORDER — MIDAZOLAM HYDROCHLORIDE 1 MG/ML
INJECTION, SOLUTION INTRAMUSCULAR; INTRAVENOUS AS NEEDED
Status: DISCONTINUED | OUTPATIENT
Start: 2018-10-19 | End: 2018-10-19 | Stop reason: HOSPADM

## 2018-10-19 RX ORDER — LIDOCAINE HYDROCHLORIDE AND EPINEPHRINE 10; 10 MG/ML; UG/ML
INJECTION, SOLUTION INFILTRATION; PERINEURAL AS NEEDED
Status: DISCONTINUED | OUTPATIENT
Start: 2018-10-19 | End: 2018-10-19 | Stop reason: HOSPADM

## 2018-10-19 RX ORDER — CEFAZOLIN SODIUM/WATER 2 G/20 ML
2 SYRINGE (ML) INTRAVENOUS EVERY 8 HOURS
Status: COMPLETED | OUTPATIENT
Start: 2018-10-19 | End: 2018-10-19

## 2018-10-19 RX ORDER — SODIUM CHLORIDE 0.9 % (FLUSH) 0.9 %
5-10 SYRINGE (ML) INJECTION AS NEEDED
Status: DISCONTINUED | OUTPATIENT
Start: 2018-10-19 | End: 2018-10-19 | Stop reason: HOSPADM

## 2018-10-19 RX ORDER — HYDROMORPHONE HYDROCHLORIDE 1 MG/ML
.2-.5 INJECTION, SOLUTION INTRAMUSCULAR; INTRAVENOUS; SUBCUTANEOUS
Status: DISCONTINUED | OUTPATIENT
Start: 2018-10-19 | End: 2018-10-19

## 2018-10-19 RX ORDER — ONDANSETRON 2 MG/ML
4 INJECTION INTRAMUSCULAR; INTRAVENOUS AS NEEDED
Status: DISCONTINUED | OUTPATIENT
Start: 2018-10-19 | End: 2018-10-19 | Stop reason: HOSPADM

## 2018-10-19 RX ORDER — ONDANSETRON 2 MG/ML
INJECTION INTRAMUSCULAR; INTRAVENOUS AS NEEDED
Status: DISCONTINUED | OUTPATIENT
Start: 2018-10-19 | End: 2018-10-19 | Stop reason: HOSPADM

## 2018-10-19 RX ORDER — DIPHENHYDRAMINE HYDROCHLORIDE 50 MG/ML
12.5 INJECTION, SOLUTION INTRAMUSCULAR; INTRAVENOUS AS NEEDED
Status: DISCONTINUED | OUTPATIENT
Start: 2018-10-19 | End: 2018-10-19 | Stop reason: HOSPADM

## 2018-10-19 RX ORDER — FENTANYL CITRATE 50 UG/ML
25 INJECTION, SOLUTION INTRAMUSCULAR; INTRAVENOUS
Status: DISCONTINUED | OUTPATIENT
Start: 2018-10-19 | End: 2018-10-19 | Stop reason: HOSPADM

## 2018-10-19 RX ADMIN — PROPOFOL 30 MG: 10 INJECTION, EMULSION INTRAVENOUS at 07:37

## 2018-10-19 RX ADMIN — Medication 2 G: at 07:34

## 2018-10-19 RX ADMIN — PROPOFOL 30 MG: 10 INJECTION, EMULSION INTRAVENOUS at 07:38

## 2018-10-19 RX ADMIN — ONDANSETRON 4 MG: 2 INJECTION INTRAMUSCULAR; INTRAVENOUS at 07:41

## 2018-10-19 RX ADMIN — SODIUM CHLORIDE, SODIUM LACTATE, POTASSIUM CHLORIDE, AND CALCIUM CHLORIDE 25 ML/HR: 600; 310; 30; 20 INJECTION, SOLUTION INTRAVENOUS at 06:45

## 2018-10-19 RX ADMIN — ACETAMINOPHEN 1000 MG: 10 INJECTION, SOLUTION INTRAVENOUS at 07:41

## 2018-10-19 RX ADMIN — SODIUM CHLORIDE, POTASSIUM CHLORIDE, SODIUM LACTATE AND CALCIUM CHLORIDE: 600; 310; 30; 20 INJECTION, SOLUTION INTRAVENOUS at 07:01

## 2018-10-19 RX ADMIN — FENTANYL CITRATE 12.5 MCG: 50 INJECTION, SOLUTION INTRAMUSCULAR; INTRAVENOUS at 07:48

## 2018-10-19 RX ADMIN — MIDAZOLAM HYDROCHLORIDE 2 MG: 1 INJECTION, SOLUTION INTRAMUSCULAR; INTRAVENOUS at 07:28

## 2018-10-19 RX ADMIN — MIDAZOLAM HYDROCHLORIDE 1 MG: 1 INJECTION, SOLUTION INTRAMUSCULAR; INTRAVENOUS at 07:31

## 2018-10-19 RX ADMIN — PROPOFOL 50 MG: 10 INJECTION, EMULSION INTRAVENOUS at 07:33

## 2018-10-19 RX ADMIN — MIDAZOLAM HYDROCHLORIDE 1 MG: 1 INJECTION, SOLUTION INTRAMUSCULAR; INTRAVENOUS at 07:29

## 2018-10-19 RX ADMIN — DEXAMETHASONE SODIUM PHOSPHATE 4 MG: 4 INJECTION, SOLUTION INTRA-ARTICULAR; INTRALESIONAL; INTRAMUSCULAR; INTRAVENOUS; SOFT TISSUE at 07:41

## 2018-10-19 RX ADMIN — PROPOFOL 80 MCG/KG/MIN: 10 INJECTION, EMULSION INTRAVENOUS at 07:33

## 2018-10-19 RX ADMIN — FENTANYL CITRATE 12.5 MCG: 50 INJECTION, SOLUTION INTRAMUSCULAR; INTRAVENOUS at 07:38

## 2018-10-19 RX ADMIN — PROPOFOL 60 MG: 10 INJECTION, EMULSION INTRAVENOUS at 07:48

## 2018-10-19 NOTE — ANESTHESIA PREPROCEDURE EVALUATION
Anesthetic History No history of anesthetic complications Review of Systems / Medical History Patient summary reviewed, nursing notes reviewed and pertinent labs reviewed Pulmonary Smoker (60 pk yrs) Neuro/Psych Within defined limits Cardiovascular Hypertension Exercise tolerance: >4 METS 
  
GI/Hepatic/Renal 
  
GERD Endo/Other Hypothyroidism Morbid obesity and cancer (breast cancer) Other Findings Comments: BPPV (benign paroxysmal positional vertigo Physical Exam 
 
Airway Mallampati: III 
TM Distance: 4 - 6 cm Neck ROM: normal range of motion, short neck Mouth opening: Normal 
 
 Cardiovascular Rhythm: regular Rate: normal 
 
 
 
 Dental 
No notable dental hx Pulmonary Breath sounds clear to auscultation Abdominal 
GI exam deferred Other Findings Anesthetic Plan ASA: 3 Anesthesia type: general 
 
Monitoring Plan: BIS Induction: Intravenous Anesthetic plan and risks discussed with: Patient

## 2018-10-19 NOTE — OP NOTES
[address] REPORT    Name:Gee STREETER  MR#: [id number]  : 1960  ACCOUNT #: [de-identified]   DATE OF SERVICE: 10/19/2018    PREOPERATIVE DIAGNOSIS:  Symptomatic right breast mass/sebaceous cyst.    POSTOPERATIVE DIAGNOSIS:  Symptomatic right breast mass/sebaceous cyst.     PROCEDURES PERFORMED:  1. Excision right breast symptomatic sebaceous cyst measuring 1.4 x 2.2 cm. 2.  Complex closure of right breast wound measuring 2.6 cm. SURGEON:  Erlin Gutierrez MD    ASSISTANT:       ANESTHESIA:  Local with IV sedation. ESTIMATED BLOOD LOSS:  Less than 5 mL    SPECIMENS REMOVED:  Right breast mass. COMPLICATIONS:  None. IMPLANTS:      INDICATIONS:  This 72-year-old white female, status post bilateral mastectomy and reconstruction presented with a painful, enlarging mass of the right medial breast.  It was felt to be consistent with sebaceous cyst.  Excision and closure were indicated. PROCEDURE:  After informed consent was obtained and under IV sedation, the operative site was infiltrated with buffered 1% lidocaine with epinephrine and then prepped and draped. An elliptical incision following relaxed skin tension lines was made directly overlying the mass and incised. The cystic structure was identified and sharply dissected free from adherent tissue. It was sent for permanent pathologic evaluation. Wound was irrigated. Significant undermining was performed in all directions, and the wound was closed in layers with 3-0 Monocryl. Dermabond and dry dressings were applied. She tolerated the procedure well and was transferred to recovery room in good condition.       MD GABY Olivarez / BERTHA  D: 10/19/2018 09:15     T: 10/19/2018 15:26  JOB #: [id number]  CC: Juni Baker MD  CC: Krzysztof Castellanos MD

## 2018-10-19 NOTE — PERIOP NOTES
Called and updated family in waiting room. Allowed time for questions and answers. 
 
0901 TRANSFER - OUT REPORT: 
 
Verbal report given to [facility] RN(name) on Jerilyn Nichols  being transferred to Phase II(unit) for routine post - op Report consisted of patients Situation, Background, Assessment and  
Recommendations(SBAR). Information from the following report(s) SBAR, Kardex, ED Summary, OR Summary, Procedure Summary, Intake/Output, MAR, Accordion, Recent Results, Med Rec Status, Cardiac Rhythm NSR, Alarm Parameters , Pre Procedure Checklist, Procedure Verification and Quality Measures was reviewed with the receiving nurse. Opportunity for questions and clarification was provided. Patient transported with: 
 Registered Nurse

## 2018-10-19 NOTE — BRIEF OP NOTE
BRIEF OPERATIVE NOTE Date of Procedure: 10/19/2018 Preoperative Diagnosis: RIGHT BREAST MASS Postoperative Diagnosis: RIGHT BREAST MASS Procedure(s): EXCISION RIGHT BREAST MASS 1.4 x 2.2cm with complex closure 2.6cm Surgeon(s) and Role: Lizy Guerrero MD - Primary Surgical Assistant:  
 
Surgical Staff: 
Circ-1: Burak Rojas Scrub Tech-1: Elimarilu Gallery Surg Asst-1: Flor Fetch Float Staff: Juan Pablo Zuleta RN Event Time In Time Out Incision Start 2722 Incision Close 0800 Anesthesia: General  
Estimated Blood Loss: 5ml Specimens:  
ID Type Source Tests Collected by Time Destination 1 : Right Breast Mass Preservative Breast  Shaun Case MD 10/19/2018 3977 Pathology Findings: see note Complications: none Implants: * No implants in log *

## 2018-10-19 NOTE — ANESTHESIA POSTPROCEDURE EVALUATION
Procedure(s): EXCISION RIGHT BREAST MASS. Anesthesia Post Evaluation Patient location during evaluation: PACU Note status: Adequate. Level of consciousness: responsive to verbal stimuli and sleepy but conscious Pain management: satisfactory to patient Airway patency: patent Anesthetic complications: no 
Cardiovascular status: acceptable Respiratory status: acceptable Hydration status: acceptable Comments: +Post-Anesthesia Evaluation and Assessment Patient: Priscilla Wilkinson MRN: [id number]  SSN: [id number] YOB: 1960  Age: 66 y.o. Sex: female Cardiovascular Function/Vital Signs /67   Pulse 73   Temp 36.6 °C (97.8 °F)   Resp 17   Ht 5' 1\" (1.549 m)   Wt 113.2 kg (249 lb 9 oz)   SpO2 100%   BMI 47.15 kg/m² Patient is status post Procedure(s): EXCISION RIGHT BREAST MASS. Nausea/Vomiting: Controlled. Postoperative hydration reviewed and adequate. Pain: 
Pain Scale 1: Numeric (0 - 10) (10/19/18 0825) Pain Intensity 1: 0 (10/19/18 0825) Managed. Neurological Status:  
Neuro (WDL): Exceptions to WDL (10/19/18 0809) At baseline. Mental Status and Level of Consciousness: Arousable. Pulmonary Status:  
O2 Device: Nasal cannula (10/19/18 0825) Adequate oxygenation and airway patent. Complications related to anesthesia: None Post-anesthesia assessment completed. No concerns. Signed By: Nato Steven MD  
 10/19/2018 Visit Vitals /67 Pulse 73 Temp 36.6 °C (97.8 °F) Resp 17 Ht 5' 1\" (1.549 m) Wt 113.2 kg (249 lb 9 oz) SpO2 100% BMI 47.15 kg/m²

## 2018-10-19 NOTE — DISCHARGE INSTRUCTIONS
May shower tomorrow and get incisions wet. Follow up with Dr. Kassy Aragon in 2 Weeks. Please call the office to schedule your appointment. How to Care for Your Wound After Its Treated With  DERMABOND* Topical Skin Adhesive  DERMABOND* Topical Skin Adhesive (2-octyl cyanoacrylate) is a sterile, liquid skin adhesive  that holds wound edges together. The film will usually remain in place for 5 to 10 days, then  naturally fall off your skin. The following will answer some of your questions and provide instructions for proper care for your  wound while it is healing:    CHECK WOUND APPEARANCE   Some swelling, redness, and pain are common with all wounds and normally will go away as the  wound heals. If swelling, redness, or pain increases or if the wound feels warm to the touch,  contact a doctor. Also contact a doctor if the wound edges reopen or separate. REPLACE BANDAGES   If your wound is bandaged, keep the bandage dry.  Replace the dressing daily until the adhesive film has fallen off or if the  bandage should become wet, unless otherwise instructed by your  physician.  When changing the dressing, do not place tape directly over the  DERMABOND adhesive film, because removing the tape later may also  remove the film. AVOID TOPICAL MEDICATIONS   Do not apply liquid or ointment medications or any other product to your wound while the  DERMABOND adhesive film is in place. These may loosen the film before your wound is healed. KEEP WOUND DRY AND PROTECTED   You may occasionally and briefly wet your wound in the shower or bath. Do not soak or scrub  your wound, do not swim, and avoid periods of heavy perspiration until the DERMABOND  adhesive has naturally fallen off. After showering or bathing, gently blot your wound dry with a  soft towel. If a protective dressing is being used, apply a fresh, dry bandage, being sure to keep  the tape off the DERMABOND adhesive film.    Apply a clean, dry bandage over the wound if necessary to protect it.  Protect your wound from injury until the skin has had sufficient time to heal.   Do not scratch, rub, or pick at the DERMABOND adhesive film. This may loosen the film before  your wound is healed.  Protect the wound from prolonged exposure to sunlight or tanning lamps while the film is in  place. If you have any questions or concerns about this product, please consult your doctor. *Trademark ©[employer] 2002    TO PREVENT AN INFECTION      1. [address] YOUR HANDS     To prevent infection, good handwashing is the most important thing you or your caregiver can do.  Wash your hands with soap and water or use the hand  we gave you before you touch any wounds. 2. SHOWER     Use the antibacterial soap we gave you when you take a shower.  Shower with this soap until your wounds are healed.  To reach all areas of your body, you may need someone to help you.  Dont forget to clean your belly button with every shower. 3.  USE CLEAN SHEETS     Use freshly cleaned sheets on your bed after surgery.  To keep the surgery site clean, do not allow pets to sleep with you while your wound is still healing. 4. STOP SMOKING     Stop smoking, or at least cut back on smoking     Smoking slows your healing. 5.  CONTROL YOUR BLOOD SUGAR     High blood sugars slow wound healing.  If you are diabetic, control your blood sugar levels before and after your surgery. A common side effect of anesthesia following surgery is nausea and/or vomiting. In order to decrease symptoms, it is wise to avoid foods that are high in fat, greasy foods, milk products, and spicy foods for the first 24 hours.     Acceptable foods for the first 24 hours following surgery include but are not limited to:     soup   broth    toast    crackers    applesauce    bananas    mashed potatoes,   soft or scrambled eggs   oatmeal    jello    It is important to eat when taking your pain medication. This will help to prevent nausea. If possible, please try to time your meals with your medications. It is very important to stay hydrated following surgery. Sip fluids frequently while awake. Avoid acidic drinks such as citrus juices and soda for 24 hours. Carbonated beverages may cause bloating and gas. Acceptable fluids include:    - water (flavor packets may add variety)  - coffee or tea (in moderation)  - Gatorade  - Clark-aid  - apple juice  - cranberry juice    You are encouraged to cough and deep breathe every hour when awake. This will help to prevent respiratory complications following anesthesia. You may want to hug a pillow when coughing and sneezing to add additional support to the surgical area and to decrease discomfort if you had abdominal or chest surgery. If you are discharged home with support stockings, you may remove them after 24 hours. Support stockings are used to help prevent blood clots in the legs following surgery. Please take time to review all of your Home Care Instructions and Medication Information sheets provided in your discharge packet. If you have any questions, please contact your surgeons office. Thank you. DISCHARGE SUMMARY from Nurse    PATIENT INSTRUCTIONS:    After general anesthesia or intravenous sedation, for 24 hours or while taking prescription Narcotics:  · Limit your activities  · Do not drive and operate hazardous machinery  · Do not make important personal or business decisions  · Do  not drink alcoholic beverages  · If you have not urinated within 8 hours after discharge, please contact your surgeon on call.     Report the following to your surgeon:  · Excessive pain, swelling, redness or odor of or around the surgical area  · Temperature over 100.5  · Nausea and vomiting lasting longer than 4 hours or if unable to take medications  · Any signs of decreased circulation or nerve impairment to extremity: change in color, persistent  numbness, tingling, coldness or increase pain  · Any questions    What to do at Home:    Surgeon General's Warning:  Quitting smoking now greatly reduces serious risk to your health. Obesity, smoking, and sedentary lifestyle greatly increases your risk for illness    A healthy diet, regular physical exercise & weight monitoring are important for maintaining a healthy lifestyle    You may be retaining fluid if you have a history of heart failure or if you experience any of the following symptoms:  Weight gain of 3 pounds or more overnight or 5 pounds in a week, increased swelling in our hands or feet or shortness of breath while lying flat in bed. Please call your doctor as soon as you notice any of these symptoms; do not wait until your next office visit. Recognize signs and symptoms of STROKE:    F-face looks uneven    A-arms unable to move or move unevenly    S-speech slurred or non-existent    T-time-call 911 as soon as signs and symptoms begin-DO NOT go       Back to bed or wait to see if you get better-TIME IS BRAIN. Warning Signs of HEART ATTACK     Call 911 if you have these symptoms:   Chest discomfort. Most heart attacks involve discomfort in the center of the chest that lasts more than a few minutes, or that goes away and comes back. It can feel like uncomfortable pressure, squeezing, fullness, or pain.  Discomfort in other areas of the upper body. Symptoms can include pain or discomfort in one or both arms, the back, neck, jaw, or stomach.  Shortness of breath with or without chest discomfort.  Other signs may include breaking out in a cold sweat, nausea, or lightheadedness. Don't wait more than five minutes to call 911 - MINUTES MATTER! Fast action can save your life. Calling 911 is almost always the fastest way to get lifesaving treatment.  Emergency Medical Services staff can begin treatment when they arrive -- up to an hour sooner than if someone gets to the hospital by car. The discharge information has been reviewed with the patient and caregiver. The patient and caregiver verbalized understanding. Discharge medications reviewed with the patient and caregiver and appropriate educational materials and side effects teaching were provided.   ___________________________________________________________________________________________________________________________________

## 2018-10-19 NOTE — PERIOP NOTES
Handoff Report from Operating Room to PACU Report received from Rajendra Tyler and Ary Shaikh CRNA regarding Steven Nichols. Surgeon(s): 
Petra Lucas MD  And Procedure(s) (LRB): 
EXCISION RIGHT BREAST MASS (Right)  confirmed  
with allergies and dressings discussed. Anesthesia type, drugs, patient history, complications, estimated blood loss, vital signs, intake and output, and last pain medication, lines, reversal medications and temperature were reviewed.

## 2018-11-07 ENCOUNTER — OFFICE VISIT (OUTPATIENT)
Dept: SURGERY | Age: 58
End: 2018-11-07

## 2018-11-07 VITALS
DIASTOLIC BLOOD PRESSURE: 89 MMHG | WEIGHT: 240 LBS | HEIGHT: 61 IN | HEART RATE: 93 BPM | SYSTOLIC BLOOD PRESSURE: 147 MMHG | BODY MASS INDEX: 45.31 KG/M2

## 2018-11-07 DIAGNOSIS — C50.919 RECURRENT MALIGNANT NEOPLASM OF BREAST, UNSPECIFIED LATERALITY (HCC): Primary | ICD-10-CM

## 2018-11-07 NOTE — H&P (VIEW-ONLY)
HISTORY OF PRESENT ILLNESS 
Fanny Nichols is a 66 y.o. female. HPI  Patient consult for recurrent RIGHT breast cancer. Patient noticed a lump to her RIGHT breast and Dr. Spring Mclaughlin removed it and pathology showed it was breast cancer. Denies pain. Incision is healing well. 10/19/18 - RIGHT breast excisional biopsy - pathology showed RIGHT breast adenocarcinoma, %, [place] 95%, HER2 neg. Breast cancer history: 
RIGHT multifocal breast carcinoma, IDC T2 (3.5 cm) No Er/Pr+ Ner 2 gareth negative. 9/24/15 - BILATERAL mastectomy with reconstruction High risk mammaprint Saw Dr. Carlota Sandy 5/12/16 -  \"Its been 8 months since her surgery. Although the tumor are rather large, there is no role of adjuvant chemotherapy at this point. \"  Took Letrozole for short time. Patient did not go back to see Dr. Carlota Sandy. Denies Family History of breast or ovarian cancer. Review of Systems All other systems reviewed and are negative. Physical Exam  
Pulmonary/Chest:  
 
 
No masses over implants. Surgical scar healing well No adneopathy Nursing note and vitals reviewed. ASSESSMENT and PLAN 
  ICD-10-CM ICD-9-CM 1. Recurrent malignant neoplasm of breast, unspecified laterality (HCC) C50.919 174.9 Breast cancer history: 
RIGHT multifocal breast carcinoma, IDC T2 (3.5 cm) No Er/Pr+ Ner 2 gareth negative. 9/24/15 - BILATERAL mastectomy with reconstruction High risk mammaprint 
 
- needs staging with PET scan 
- will refer back to Dr. Carlota Sandy and present at cancer conference next week - possible need for skin re-excision and port 
- may need xrt

## 2018-11-07 NOTE — PROGRESS NOTES
HISTORY OF PRESENT ILLNESS  Jody Nichols is a 66 y.o. female. HPI  Patient consult for recurrent RIGHT breast cancer. Patient noticed a lump to her RIGHT breast and Dr. Jose Peterson removed it and pathology showed it was breast cancer. Denies pain. Incision is healing well. 10/19/18 - RIGHT breast excisional biopsy - pathology showed RIGHT breast adenocarcinoma, %, [place] 95%, HER2 neg. Breast cancer history:  RIGHT multifocal breast carcinoma, IDC T2 (3.5 cm) No Er/Pr+ Ner 2 gareth negative. 9/24/15 - BILATERAL mastectomy with reconstruction  High risk mammaprint  Saw Dr. Shawn Everett 5/12/16 -  \"Its been 8 months since her surgery. Although the tumor are rather large, there is no role of adjuvant chemotherapy at this point. \"  Took Letrozole for short time. Patient did not go back to see Dr. Shawn Everett. Denies Family History of breast or ovarian cancer. Review of Systems   All other systems reviewed and are negative. Physical Exam   Pulmonary/Chest:       No masses over implants. Surgical scar healing well  No adneopathy     Nursing note and vitals reviewed. ASSESSMENT and PLAN    ICD-10-CM ICD-9-CM    1. Recurrent malignant neoplasm of breast, unspecified laterality (HCC) C50.919 174.9    Breast cancer history:  RIGHT multifocal breast carcinoma, IDC T2 (3.5 cm) No Er/Pr+ Ner 2 gareth negative.    9/24/15 - BILATERAL mastectomy with reconstruction  High risk mammaprint    - needs staging with PET scan  - will refer back to Dr. Shawn Everett and present at cancer conference next week  - possible need for skin re-excision and port  - may need xrt

## 2018-11-08 DIAGNOSIS — C50.919 MALIGNANT NEOPLASM OF FEMALE BREAST, UNSPECIFIED ESTROGEN RECEPTOR STATUS, UNSPECIFIED LATERALITY, UNSPECIFIED SITE OF BREAST (HCC): Primary | ICD-10-CM

## 2018-11-19 ENCOUNTER — HOSPITAL ENCOUNTER (OUTPATIENT)
Dept: PET IMAGING | Age: 58
Discharge: HOME | End: 2018-11-19
Attending: SURGERY
Payer: COMMERCIAL

## 2018-11-19 ENCOUNTER — TELEPHONE (OUTPATIENT)
Dept: SURGERY | Age: 58
End: 2018-11-19

## 2018-11-19 VITALS — BODY MASS INDEX: 45.31 KG/M2 | WEIGHT: 240 LBS | HEIGHT: 61 IN

## 2018-11-19 DIAGNOSIS — C50.919 RECURRENT MALIGNANT NEOPLASM OF BREAST, UNSPECIFIED LATERALITY (HCC): ICD-10-CM

## 2018-11-19 PROCEDURE — A9552 F18 FDG: HCPCS

## 2018-11-19 RX ORDER — SODIUM CHLORIDE 0.9 % (FLUSH) 0.9 %
10 SYRINGE (ML) INJECTION
Status: COMPLETED | OUTPATIENT
Start: 2018-11-19 | End: 2018-11-19

## 2018-11-19 RX ADMIN — Medication 10 ML: at 09:07

## 2018-11-19 NOTE — TELEPHONE ENCOUNTER
PET/CT dept called with patient there now. Patient requesting medicine for anxiety. Discussed with Dr. Roscoe Crowell. Called in xanax 1mg tablet x1 take now. She does not have a  home, but dept is going to keep patient until has worn off.

## 2018-11-20 ENCOUNTER — TELEPHONE (OUTPATIENT)
Dept: SURGERY | Age: 58
End: 2018-11-20

## 2018-11-20 NOTE — TELEPHONE ENCOUNTER
Patient called and requested results to her recent PET scan. I notified Dr. Harvey Simons who will call patient tomorrow with plan. She reviewed results and asked me to call patient. I called patient to let her know that it doesn't look like anything worrisome. She was appreciative of call.

## 2018-11-26 ENCOUNTER — OFFICE VISIT (OUTPATIENT)
Dept: ONCOLOGY | Age: 58
End: 2018-11-26

## 2018-11-26 VITALS
HEIGHT: 61 IN | TEMPERATURE: 98.6 F | SYSTOLIC BLOOD PRESSURE: 135 MMHG | RESPIRATION RATE: 16 BRPM | WEIGHT: 250.6 LBS | OXYGEN SATURATION: 95 % | HEART RATE: 83 BPM | BODY MASS INDEX: 47.31 KG/M2 | DIASTOLIC BLOOD PRESSURE: 79 MMHG

## 2018-11-26 DIAGNOSIS — C50.211 MALIGNANT NEOPLASM OF UPPER-INNER QUADRANT OF RIGHT BREAST IN FEMALE, ESTROGEN RECEPTOR POSITIVE (HCC): Primary | ICD-10-CM

## 2018-11-26 DIAGNOSIS — Z17.0 MALIGNANT NEOPLASM OF UPPER-INNER QUADRANT OF RIGHT BREAST IN FEMALE, ESTROGEN RECEPTOR POSITIVE (HCC): Primary | ICD-10-CM

## 2018-11-26 DIAGNOSIS — R11.0 CHEMOTHERAPY-INDUCED NAUSEA: ICD-10-CM

## 2018-11-26 DIAGNOSIS — T45.1X5A CHEMOTHERAPY-INDUCED NAUSEA: ICD-10-CM

## 2018-11-26 RX ORDER — LIDOCAINE AND PRILOCAINE 25; 25 MG/G; MG/G
CREAM TOPICAL AS NEEDED
Qty: 30 G | Refills: 0 | Status: SHIPPED | OUTPATIENT
Start: 2018-11-26 | End: 2019-04-17 | Stop reason: ALTCHOICE

## 2018-11-26 RX ORDER — PROCHLORPERAZINE MALEATE 10 MG
5 TABLET ORAL
Qty: 60 TAB | Refills: 3 | Status: SHIPPED | OUTPATIENT
Start: 2018-11-26 | End: 2018-12-03

## 2018-11-26 RX ORDER — DEXAMETHASONE 4 MG/1
8 TABLET ORAL 2 TIMES DAILY WITH MEALS
Qty: 48 TAB | Refills: 0 | Status: SHIPPED | OUTPATIENT
Start: 2018-11-26 | End: 2019-04-17 | Stop reason: ALTCHOICE

## 2018-11-26 RX ORDER — ONDANSETRON 4 MG/1
4 TABLET, ORALLY DISINTEGRATING ORAL
Qty: 40 TAB | Refills: 1 | Status: SHIPPED | OUTPATIENT
Start: 2018-11-26 | End: 2019-04-17 | Stop reason: ALTCHOICE

## 2018-11-27 ENCOUNTER — TELEPHONE (OUTPATIENT)
Dept: SURGERY | Age: 58
End: 2018-11-27

## 2018-11-27 RX ORDER — PALONOSETRON 0.05 MG/ML
0.25 INJECTION, SOLUTION INTRAVENOUS ONCE
Status: CANCELLED | OUTPATIENT
Start: 2018-12-20 | End: 2018-12-19

## 2018-11-27 RX ORDER — DEXAMETHASONE SODIUM PHOSPHATE 100 MG/10ML
10 INJECTION INTRAMUSCULAR; INTRAVENOUS ONCE
Status: CANCELLED | OUTPATIENT
Start: 2018-12-20 | End: 2018-12-19

## 2018-11-27 RX ORDER — HYDROCORTISONE SODIUM SUCCINATE 100 MG/2ML
100 INJECTION, POWDER, FOR SOLUTION INTRAMUSCULAR; INTRAVENOUS AS NEEDED
Status: CANCELLED | OUTPATIENT
Start: 2018-12-20

## 2018-11-27 RX ORDER — HEPARIN 100 UNIT/ML
300-500 SYRINGE INTRAVENOUS AS NEEDED
Status: CANCELLED
Start: 2018-12-20

## 2018-11-27 RX ORDER — SODIUM CHLORIDE 9 MG/ML
10 INJECTION INTRAMUSCULAR; INTRAVENOUS; SUBCUTANEOUS AS NEEDED
Status: CANCELLED | OUTPATIENT
Start: 2018-12-20

## 2018-11-27 RX ORDER — ALBUTEROL SULFATE 0.83 MG/ML
2.5 SOLUTION RESPIRATORY (INHALATION) AS NEEDED
Status: CANCELLED
Start: 2018-12-20

## 2018-11-27 RX ORDER — SODIUM CHLORIDE 9 MG/ML
25 INJECTION, SOLUTION INTRAVENOUS CONTINUOUS
Status: CANCELLED | OUTPATIENT
Start: 2018-12-20 | End: 2018-12-20

## 2018-11-27 RX ORDER — EPINEPHRINE 1 MG/ML
0.3 INJECTION, SOLUTION, CONCENTRATE INTRAVENOUS AS NEEDED
Status: CANCELLED | OUTPATIENT
Start: 2018-12-20

## 2018-11-27 RX ORDER — DIPHENHYDRAMINE HYDROCHLORIDE 50 MG/ML
50 INJECTION, SOLUTION INTRAMUSCULAR; INTRAVENOUS AS NEEDED
Status: CANCELLED
Start: 2018-12-20

## 2018-11-27 RX ORDER — ONDANSETRON 2 MG/ML
8 INJECTION INTRAMUSCULAR; INTRAVENOUS AS NEEDED
Status: CANCELLED | OUTPATIENT
Start: 2018-12-20

## 2018-11-27 RX ORDER — SODIUM CHLORIDE 0.9 % (FLUSH) 0.9 %
10 SYRINGE (ML) INJECTION AS NEEDED
Status: CANCELLED
Start: 2018-12-20

## 2018-11-27 RX ORDER — ACETAMINOPHEN 325 MG/1
650 TABLET ORAL AS NEEDED
Status: CANCELLED
Start: 2018-12-20

## 2018-11-27 NOTE — TELEPHONE ENCOUNTER
Returned patient's call. L/M earlier saying that she had questions about her PAC insertion. Her question is whether or not Dr. Brigida Zamora is going to re-excise the skin at the time of her PAC. She said that Dr. Brigida Zamora suggested this, but Dr. Ketty Sanchez said it was not necessary. Also wants to make sure that nothing will happen to her implants if the re-excision is done. It sounds like she will be getting TC times four and will probably be starting before [place]. I told the patient I would have Dr. Brigida Zamora call her tomorrow. She was appreciative of the call.

## 2018-11-27 NOTE — PROGRESS NOTES
Oncology Follow Up Patient: Jonelle Allen MRN: [id number]  SSN: [id number] YOB: 1960  Age: 66 y.o. Sex: female Diagnosis: 1. Recurrent breast carcinoma Right chest wall 2. Right breast carcinoma: 
T2 N0 M0 (Stage IIA) infiltrating ductal carcinoma, Tumor size 2.2 cm and 3.5 cm, LN -ve, grade 1, ER 95%, HI 90%, Her 2 -ve Treatment:  
 
1. Excision of the right sided chest wall/breast mass 10/19/2018 
2. B/L mastectomy on 09/25/2015 Subjective:  
  
Geovany Nichols is a 66 y.o. female who I am seeing in follow up for recurrent breast carcinoma. She underwent a screening mammogram in July 2015. Abnormality was noted in the right breast. 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 She elected to undergo b/l mastectomy and had it done on 09/25/2015. She suffered necrosis of skin flaps and had to undergo redo surgeries. After several rounds of salvage surgeries, she is completely healed. She took AI for a few months and then stopped on her own and also did not follow up in the clinic. She suffered a recurrence on the right chest wall over the reconstructed breast which was excised by Dr. Kathy Ma on 10/19/2018. The margins are close. She saw Dr. Kitty Crump. Review of Systems: 
 
Constitutional: negative Eyes: negative Ears, Nose, Mouth, Throat, and Face: negative Respiratory: negative Cardiovascular: negative Gastrointestinal: negative Genitourinary:negative Integument/Breast: negative Hematologic/Lymphatic: negative Musculoskeletal:negative Neurological: negative Past Medical History:  
Diagnosis Date  Acid reflux  Adverse effect of anesthesia   
 sister has a lot of allergies and issues with anesthesia per pt  BPPV (benign paroxysmal positional vertigo)  Breast cancer ([facility] 7/2014  
 right  Environmental allergies  Gallstones Negative workup in 2009  GERD (gastroesophageal reflux disease)  HTN (hypertension) 2015  Hypertension 2013  Hypertension  Hypoactive thyroid  Hypothyroidism 2015  Mixed hyperlipidemia 2016  Morbid obesity ([address]  Prediabetes 2016 Past Surgical History:  
Procedure Laterality Date [address] Vaginal deliveries  HX LAP CHOLECYSTECTOMY  OR ERCP REMOVE CALCULI/DEBRIS BILIARY/PANCREAS DUCT  3/25/2016  OR ERCP W/SPHINCTEROTOMY/PAPILLOTOMY  3/25/2016 Family History Problem Relation Age of Onset  Heart Disease Mother  Stroke Father  Asthma Sister  Hypertension Sister Social History Tobacco Use  Smoking status: Former Smoker Packs/day: 2.00 Years: 30.00 Pack years: 60.00 Types: Cigarettes Last attempt to quit: 2013 Years since quittin.9  Smokeless tobacco: Never Used Substance Use Topics  Alcohol use: Yes Alcohol/week: 4.2 oz Types: 7 Glasses of wine per week Prior to Admission medications Medication Sig Start Date End Date Taking? Authorizing Provider  
lidocaine-prilocaine (EMLA) topical cream Apply  to affected area as needed for Pain. 18  Yes Lizandor Ortega MD  
ondansetron (ZOFRAN ODT) 4 mg disintegrating tablet Take 1 Tab by mouth every eight (8) hours as needed for Nausea. 18  Yes Lizandro Ortega MD  
prochlorperazine (COMPAZINE) 10 mg tablet Take 0.5 Tabs by mouth every six (6) hours as needed for up to 7 days. 11/26/18 12/3/18 Yes Lizandro Ortega MD  
dexamethasone (DECADRON) 4 mg tablet Take 8 mg by mouth two (2) times daily (with meals). Take two times a day the day before, day of, and day after chemotherapy. 18  Yes Lizandro Ortega MD  
omeprazole (PRILOSEC) 20 mg capsule Take 20 mg by mouth nightly. Yes Provider, Historical  
cholecalciferol, vitamin D3, (VITAMIN D3 PO) Take  by mouth daily.    Yes Provider, Historical  
 diphenhydrAMINE (BENADRYL) 25 mg capsule Take 25 mg by mouth as needed. Yes Provider, Historical  
oxymetazoline HCl (OXYMETAZOLINE NA) by Nasal route as needed. Yes Provider, Historical  
atorvastatin (LIPITOR) 10 mg tablet TAKE 1 TABLET BY MOUTH AT BEDTIME 4/7/18  Yes Megan Cordoba MD  
amLODIPine (NORVASC) 10 mg tablet take 1 tablet by mouth once daily 3/27/18  Yes Megan Cordoba MD  
lisinopril-hydroCHLOROthiazide Avelino Chan ZESTORETIC) 20-12.5 mg per tablet take 2 tablets by mouth once daily 3/27/18  Yes Megan Cordoba MD  
levothyroxine (SYNTHROID) 25 mcg tablet take 1 tablet by mouth once daily BEFORE BREAKFAST 3/27/18  Yes Megan Cordoba MD  
  
 
 
 
 
No Known Allergies Objective:  
 
Vitals:  
 11/26/18 1518 BP: 135/79 Pulse: 83 Resp: 16 Temp: 98.6 °F (37 °C) TempSrc: Oral  
SpO2: 95% Weight: 250 lb 9.6 oz (113.7 kg) Height: 5' 1\" (1.549 m) Physical Exam: 
 
GENERAL: alert, cooperative EYE: negative LYMPHATIC: Cervical, supraclavicular, and axillary nodes normal.  
THROAT & NECK: normal and no erythema or exudates noted. LUNG: clear to auscultation bilaterally HEART: regular rate and rhythm ABDOMEN: soft, non-tender EXTREMITIES: no cyanosis or edema SKIN: Normal. 
NEUROLOGIC: negative Assessment: 1. Recurrent breast carcinoma Right chest wall 2. Right breast carcinoma: 
T2 N0 M0 (Stage IIA) infiltrating ductal carcinoma, Tumor size 2.2 cm and 3.5 cm, LN -ve, grade 1, ER 95%, UT 90%, Her 2 -ve ECOG PS 0 Intent of treatment - curative Prognosis - good S/P B/L BREAST SKIN SPARING MASTECTOMY, RIGHT SENTINEL NODE BIOPSY, 09/24/2015 Excision of the right sided chest wall/breast mass 10/19/2018 The initial Mammaprint was high risk. Since it took a good 8 months to fully recover from complication of her breast surgery, I did not recommend adjuvant chemotherapy. She was poorly compliant with AI. Since she has suffered recurrence of breast cancer in the right breast, she comes back for consideration of adjuvant therapy. Given the initial Mammaprint result as high risk and the fact that she has sustained local recurrence of disease, I proposed adjuvant chemotherapy with Taxotere/Cytoxan for 4 cycles. I counseled the patient regarding the chemotherapy. Discussions included side-effect, toxicity, benefit and risks of chemotherapy. She understood the expected side-effect which includes alopecia, nausea, peripheral neuropathy, neutropenic fever, anemia, need for transfusion among other things. After weighing the benefit and risks, she agreed to proceed with chemotherapy. She understands that there is no alternative to this treatment. I spent 65 minute with the patient in a face-to-face encounter. I explained her the stage of the disease, pathophysiology of the disease and the treatment approaches. I answered all her questions. She will undergo re-excision of the skin margin, a port-a-cath placement and then we shall begin adjuvant chemotherapy. After completion of adjuvant chemotherapy, she will undergo adjuvant radiation and anti-estrogen therapy. Plan: 1. Re-excision per Dr. Kemar Underwood 2. Port-a-cath placement by Dr. Kemar Underwood 3. Plan to start adjuvant chemotherapy 4. Return at the time of starting chemotherapy. Signed By: Casey Martini MD   
 November 27, 2018   
  
 
 
CC. Carlos Dodd MD 
CC.  Yaneli Rivers MD

## 2018-11-28 ENCOUNTER — TELEPHONE (OUTPATIENT)
Dept: SURGERY | Age: 58
End: 2018-11-28

## 2018-11-28 DIAGNOSIS — C50.919 MALIGNANT NEOPLASM OF FEMALE BREAST, UNSPECIFIED ESTROGEN RECEPTOR STATUS, UNSPECIFIED LATERALITY, UNSPECIFIED SITE OF BREAST (HCC): Primary | ICD-10-CM

## 2018-11-28 NOTE — TELEPHONE ENCOUNTER
Returned call to Nir in [facility] and relayed info from Dr. Henriquez Fail that patient does not need any more pre-op labwork, etc.    Nir will call the patient.

## 2018-11-28 NOTE — TELEPHONE ENCOUNTER
----- Message from Vy Booker MD sent at 11/28/2018 10:06 AM EST -----  Regarding: RE: WALTER aaron  ----- Message -----  From: Milan Pérez RN  Sent: 11/28/2018   9:50 AM  To: Vy Booker MD  Subject: WALTER Richardson,    Received call from Dora at [facility] PAT, re: does patient need to come in for PAT? She is scheduled for tomorrow but apparently the patient called asking if really necessary since she does have a recent CBC, BMP, and EKG. Unless there is anything else/new you need like chest x-ray. Otherwise, PAT can just call patient and go over meds, etc, if she doesn't need to come in. Dora [phone].     Thanks,  [address]

## 2018-11-28 NOTE — PERIOP NOTES
Spoke with Timmy Christine, patient does not need to come in for PAT visit, per Dr. Narendra Mo. Patient changed to phone assessment.

## 2018-11-30 NOTE — PERIOP NOTES
[facility] Ambulatory Surgery Unit Pre-operative Instructions Surgery/Procedure Date  Friday, December 7, 2018            Tentative Arrival Time 4318 1. On the day of your surgery/procedure, please report to the Ambulatory Surgery Unit Registration Desk and sign in at your designated time. The Ambulatory Surgery Unit is located in [facility] on the [facility] include 234 beds at the [facility] side of the [facility] across from the [address] [place] [address]. Please have all of your health insurance cards and a photo ID. 2. You must have someone with you to drive you home, as you should not drive a car for 24 hours following anesthesia. Please make arrangements for a responsible adult friend or family member to stay with you for at least the first 24 hours after your surgery. 3. Do not have anything to eat or drink (including water, gum, mints, coffee, juice) after 11:59 PM, Thursday. This may not apply to medications prescribed by your physician. (Please note below the special instructions with medications to take the morning of surgery, if applicable.) 4. We recommend you do not drink any alcoholic beverages for 24 hours before and after your surgery. 5. Contact your surgeons office for instructions on the following medications: non-steroidal anti-inflammatory drugs (i.e. Advil, Aleve), vitamins, and supplements. (Some surgeons will want you to stop these medications prior to surgery and others may allow you to take them) **If you are currently taking Plavix, Coumadin, Aspirin and/or other blood-thinning agents, contact your surgeon for instructions. ** Your surgeon will partner with the physician prescribing these medications to determine if it is safe to stop or if you need to continue taking. Please do not stop taking these medications without instructions from your surgeon.  
 
6. In an effort to help prevent surgical site infection, we ask that you shower with an anti-bacterial soap (i.e. Dial/Safeguard, or the soap provided to you at your preadmission testing appointment) for 3 days prior to and on the morning of surgery, using a fresh towel after each shower. (Please begin this process with fresh bed linens.) Do not apply any lotions, powders, or deodorants after the shower on the day of your procedure. If applicable, please do not shave the operative site for 48 hours prior to surgery. 7. Wear comfortable clothes. Wear glasses instead of contacts. Do not bring any jewelry or money (other than copays or fees as instructed). Do not wear make-up, particularly mascara, the morning of your surgery. Do not wear nail polish, particularly if you are having foot /hand surgery. Wear your hair loose or down, no ponytails, buns, shay pins or clips. All body piercings must be removed. 8. You should understand that if you do not follow these instructions your surgery may be cancelled. If your physical condition changes (i.e. fever, cold or flu) please contact your surgeon as soon as possible. 9. It is important that you be on time. If a situation occurs where you may be late, or if you have any questions or problems, please call [phone]. 
 
10. Your surgery time may be subject to change. You will receive a phone call the day prior to surgery to confirm your arrival time. 11. Pediatric patients: please bring a change of clothes, diapers, bottle/sippy cup, pacifier, etc. 
 
 
Special Instructions: Take all medications and inhalers, as prescribed, on the morning of surgery with a sip of water EXCEPT: no over the counter medications day of surgery. I understand a pre-operative phone call will be made to verify my surgery time. In the event that I am not available, I give permission for a message to be left on my answering service and/or with another person? yes Preop instructions reviewed  Pt verbalized understanding. ___________________      ___________________      ________________ 
(Signature of Patient)          (Witness)                   (Date and Time)

## 2018-12-04 NOTE — ADVANCED PRACTICE NURSE
LAUREN 4 in PAT phone assessment. Pt denies snoring loud enough to be heard through a closed door, fatigue after a good night's sleep, or ever having been advised that she has periods of apnea while sleeping. PT has not been referred for or recommended for a sleep apnea evaluation previously.

## 2018-12-06 ENCOUNTER — ANESTHESIA EVENT (OUTPATIENT)
Dept: SURGERY | Age: 58
End: 2018-12-06
Payer: COMMERCIAL

## 2018-12-07 ENCOUNTER — HOSPITAL ENCOUNTER (OUTPATIENT)
Age: 58
Setting detail: OUTPATIENT SURGERY
Discharge: HOME | End: 2018-12-07
Attending: SURGERY | Admitting: SURGERY
Payer: COMMERCIAL

## 2018-12-07 ENCOUNTER — APPOINTMENT (OUTPATIENT)
Dept: GENERAL RADIOLOGY | Age: 58
End: 2018-12-07
Attending: SURGERY
Payer: COMMERCIAL

## 2018-12-07 ENCOUNTER — ANESTHESIA (OUTPATIENT)
Dept: SURGERY | Age: 58
End: 2018-12-07
Payer: COMMERCIAL

## 2018-12-07 VITALS
DIASTOLIC BLOOD PRESSURE: 74 MMHG | WEIGHT: 247 LBS | SYSTOLIC BLOOD PRESSURE: 130 MMHG | TEMPERATURE: 98.3 F | BODY MASS INDEX: 46.63 KG/M2 | HEIGHT: 61 IN | HEART RATE: 80 BPM | RESPIRATION RATE: 10 BRPM | OXYGEN SATURATION: 100 %

## 2018-12-07 DIAGNOSIS — C50.919 MALIGNANT NEOPLASM OF FEMALE BREAST, UNSPECIFIED ESTROGEN RECEPTOR STATUS, UNSPECIFIED LATERALITY, UNSPECIFIED SITE OF BREAST (HCC): ICD-10-CM

## 2018-12-07 PROCEDURE — 77030008684 HC TU ET CUF COVD -B: Performed by: ANESTHESIOLOGY

## 2018-12-07 PROCEDURE — 77030011640 HC PAD GRND REM COVD -A: Performed by: SURGERY

## 2018-12-07 PROCEDURE — 76030000001 HC AMB SURG OR TIME 1 TO 1.5: Performed by: SURGERY

## 2018-12-07 PROCEDURE — 77030002986 HC SUT PROL J&J -A: Performed by: SURGERY

## 2018-12-07 PROCEDURE — 74011000250 HC RX REV CODE- 250: Performed by: SURGERY

## 2018-12-07 PROCEDURE — 77030020256 HC SOL INJ NACL 0.9%  500ML: Performed by: SURGERY

## 2018-12-07 PROCEDURE — 76060000062 HC AMB SURG ANES 1 TO 1.5 HR: Performed by: SURGERY

## 2018-12-07 PROCEDURE — 76210000040 HC AMBSU PH I REC FIRST 0.5 HR: Performed by: SURGERY

## 2018-12-07 PROCEDURE — C1788 PORT, INDWELLING, IMP: HCPCS | Performed by: SURGERY

## 2018-12-07 PROCEDURE — 77030039266 HC ADH SKN EXOFIN S2SG -A: Performed by: SURGERY

## 2018-12-07 PROCEDURE — 74011000250 HC RX REV CODE- 250

## 2018-12-07 PROCEDURE — 74011250636 HC RX REV CODE- 250/636: Performed by: SURGERY

## 2018-12-07 PROCEDURE — 77030002996 HC SUT SLK J&J -A: Performed by: SURGERY

## 2018-12-07 PROCEDURE — 77030020255 HC SOL INJ LR 1000ML BG: Performed by: SURGERY

## 2018-12-07 PROCEDURE — 74011250636 HC RX REV CODE- 250/636: Performed by: ANESTHESIOLOGY

## 2018-12-07 PROCEDURE — 77030011267 HC ELECTRD BLD COVD -A: Performed by: SURGERY

## 2018-12-07 PROCEDURE — 77030026438 HC STYL ET INTUB CARD -A: Performed by: ANESTHESIOLOGY

## 2018-12-07 PROCEDURE — 76210000057 HC AMBSU PH II REC 1 TO 1.5 HR: Performed by: SURGERY

## 2018-12-07 PROCEDURE — 71045 X-RAY EXAM CHEST 1 VIEW: CPT

## 2018-12-07 PROCEDURE — 74011250636 HC RX REV CODE- 250/636

## 2018-12-07 PROCEDURE — 77030018836 HC SOL IRR NACL ICUM -A: Performed by: SURGERY

## 2018-12-07 PROCEDURE — 88305 TISSUE EXAM BY PATHOLOGIST: CPT

## 2018-12-07 PROCEDURE — 77030002933 HC SUT MCRYL J&J -A: Performed by: SURGERY

## 2018-12-07 PROCEDURE — 77030031139 HC SUT VCRL2 J&J -A: Performed by: SURGERY

## 2018-12-07 PROCEDURE — 76000 FLUOROSCOPY <1 HR PHYS/QHP: CPT

## 2018-12-07 DEVICE — SYSTEM INFUS PRT CATH 8FR L66CM INTRO 8FR CHST TI SGL LUMN: Type: IMPLANTABLE DEVICE | Site: CHEST  WALL | Status: FUNCTIONAL

## 2018-12-07 RX ORDER — SODIUM CHLORIDE, SODIUM LACTATE, POTASSIUM CHLORIDE, CALCIUM CHLORIDE 600; 310; 30; 20 MG/100ML; MG/100ML; MG/100ML; MG/100ML
25 INJECTION, SOLUTION INTRAVENOUS CONTINUOUS
Status: DISCONTINUED | OUTPATIENT
Start: 2018-12-07 | End: 2018-12-07 | Stop reason: HOSPADM

## 2018-12-07 RX ORDER — SODIUM CHLORIDE 0.9 % (FLUSH) 0.9 %
5-10 SYRINGE (ML) INJECTION AS NEEDED
Status: DISCONTINUED | OUTPATIENT
Start: 2018-12-07 | End: 2018-12-07 | Stop reason: HOSPADM

## 2018-12-07 RX ORDER — PHENYLEPHRINE HCL IN 0.9% NACL 0.4MG/10ML
SYRINGE (ML) INTRAVENOUS AS NEEDED
Status: DISCONTINUED | OUTPATIENT
Start: 2018-12-07 | End: 2018-12-07 | Stop reason: HOSPADM

## 2018-12-07 RX ORDER — DIPHENHYDRAMINE HYDROCHLORIDE 50 MG/ML
12.5 INJECTION, SOLUTION INTRAMUSCULAR; INTRAVENOUS AS NEEDED
Status: DISCONTINUED | OUTPATIENT
Start: 2018-12-07 | End: 2018-12-07 | Stop reason: HOSPADM

## 2018-12-07 RX ORDER — ONDANSETRON 2 MG/ML
INJECTION INTRAMUSCULAR; INTRAVENOUS AS NEEDED
Status: DISCONTINUED | OUTPATIENT
Start: 2018-12-07 | End: 2018-12-07 | Stop reason: HOSPADM

## 2018-12-07 RX ORDER — OXYCODONE AND ACETAMINOPHEN 5; 325 MG/1; MG/1
1 TABLET ORAL
Status: DISCONTINUED | OUTPATIENT
Start: 2018-12-07 | End: 2018-12-07 | Stop reason: HOSPADM

## 2018-12-07 RX ORDER — ROCURONIUM BROMIDE 10 MG/ML
INJECTION, SOLUTION INTRAVENOUS AS NEEDED
Status: DISCONTINUED | OUTPATIENT
Start: 2018-12-07 | End: 2018-12-07 | Stop reason: HOSPADM

## 2018-12-07 RX ORDER — CEFAZOLIN SODIUM/WATER 2 G/20 ML
2 SYRINGE (ML) INTRAVENOUS
Status: COMPLETED | OUTPATIENT
Start: 2018-12-07 | End: 2018-12-07

## 2018-12-07 RX ORDER — MIDAZOLAM HYDROCHLORIDE 1 MG/ML
INJECTION, SOLUTION INTRAMUSCULAR; INTRAVENOUS AS NEEDED
Status: DISCONTINUED | OUTPATIENT
Start: 2018-12-07 | End: 2018-12-07 | Stop reason: HOSPADM

## 2018-12-07 RX ORDER — HYDROMORPHONE HYDROCHLORIDE 1 MG/ML
.2-.5 INJECTION, SOLUTION INTRAMUSCULAR; INTRAVENOUS; SUBCUTANEOUS ONCE
Status: DISCONTINUED | OUTPATIENT
Start: 2018-12-07 | End: 2018-12-07 | Stop reason: HOSPADM

## 2018-12-07 RX ORDER — LIDOCAINE HYDROCHLORIDE 20 MG/ML
INJECTION, SOLUTION EPIDURAL; INFILTRATION; INTRACAUDAL; PERINEURAL AS NEEDED
Status: DISCONTINUED | OUTPATIENT
Start: 2018-12-07 | End: 2018-12-07 | Stop reason: HOSPADM

## 2018-12-07 RX ORDER — OXYCODONE AND ACETAMINOPHEN 5; 325 MG/1; MG/1
1 TABLET ORAL
Qty: 20 TAB | Refills: 0 | Status: SHIPPED | OUTPATIENT
Start: 2018-12-07 | End: 2019-04-17 | Stop reason: ALTCHOICE

## 2018-12-07 RX ORDER — DEXAMETHASONE SODIUM PHOSPHATE 4 MG/ML
INJECTION, SOLUTION INTRA-ARTICULAR; INTRALESIONAL; INTRAMUSCULAR; INTRAVENOUS; SOFT TISSUE AS NEEDED
Status: DISCONTINUED | OUTPATIENT
Start: 2018-12-07 | End: 2018-12-07 | Stop reason: HOSPADM

## 2018-12-07 RX ORDER — MORPHINE SULFATE 10 MG/ML
2 INJECTION, SOLUTION INTRAMUSCULAR; INTRAVENOUS
Status: DISCONTINUED | OUTPATIENT
Start: 2018-12-07 | End: 2018-12-07 | Stop reason: HOSPADM

## 2018-12-07 RX ORDER — LIDOCAINE HYDROCHLORIDE 10 MG/ML
0.1 INJECTION, SOLUTION EPIDURAL; INFILTRATION; INTRACAUDAL; PERINEURAL AS NEEDED
Status: DISCONTINUED | OUTPATIENT
Start: 2018-12-07 | End: 2018-12-07 | Stop reason: HOSPADM

## 2018-12-07 RX ORDER — SODIUM CHLORIDE 0.9 % (FLUSH) 0.9 %
5-10 SYRINGE (ML) INJECTION EVERY 8 HOURS
Status: DISCONTINUED | OUTPATIENT
Start: 2018-12-07 | End: 2018-12-07 | Stop reason: HOSPADM

## 2018-12-07 RX ORDER — PROPOFOL 10 MG/ML
INJECTION, EMULSION INTRAVENOUS AS NEEDED
Status: DISCONTINUED | OUTPATIENT
Start: 2018-12-07 | End: 2018-12-07 | Stop reason: HOSPADM

## 2018-12-07 RX ORDER — FENTANYL CITRATE 50 UG/ML
INJECTION, SOLUTION INTRAMUSCULAR; INTRAVENOUS AS NEEDED
Status: DISCONTINUED | OUTPATIENT
Start: 2018-12-07 | End: 2018-12-07 | Stop reason: HOSPADM

## 2018-12-07 RX ORDER — SUCCINYLCHOLINE CHLORIDE 20 MG/ML
INJECTION INTRAMUSCULAR; INTRAVENOUS AS NEEDED
Status: DISCONTINUED | OUTPATIENT
Start: 2018-12-07 | End: 2018-12-07 | Stop reason: HOSPADM

## 2018-12-07 RX ORDER — FENTANYL CITRATE 50 UG/ML
25 INJECTION, SOLUTION INTRAMUSCULAR; INTRAVENOUS
Status: DISCONTINUED | OUTPATIENT
Start: 2018-12-07 | End: 2018-12-07 | Stop reason: HOSPADM

## 2018-12-07 RX ADMIN — ROCURONIUM BROMIDE 20 MG: 10 INJECTION, SOLUTION INTRAVENOUS at 09:14

## 2018-12-07 RX ADMIN — PROPOFOL 150 MG: 10 INJECTION, EMULSION INTRAVENOUS at 09:04

## 2018-12-07 RX ADMIN — SODIUM CHLORIDE, POTASSIUM CHLORIDE, SODIUM LACTATE AND CALCIUM CHLORIDE 25 ML/HR: 600; 310; 30; 20 INJECTION, SOLUTION INTRAVENOUS at 08:05

## 2018-12-07 RX ADMIN — SUCCINYLCHOLINE CHLORIDE 160 MG: 20 INJECTION INTRAMUSCULAR; INTRAVENOUS at 09:04

## 2018-12-07 RX ADMIN — Medication 2 G: at 09:04

## 2018-12-07 RX ADMIN — DEXAMETHASONE SODIUM PHOSPHATE 4 MG: 4 INJECTION, SOLUTION INTRA-ARTICULAR; INTRALESIONAL; INTRAMUSCULAR; INTRAVENOUS; SOFT TISSUE at 09:15

## 2018-12-07 RX ADMIN — FENTANYL CITRATE 100 MCG: 50 INJECTION, SOLUTION INTRAMUSCULAR; INTRAVENOUS at 09:04

## 2018-12-07 RX ADMIN — LIDOCAINE HYDROCHLORIDE 100 MG: 20 INJECTION, SOLUTION EPIDURAL; INFILTRATION; INTRACAUDAL; PERINEURAL at 09:04

## 2018-12-07 RX ADMIN — ONDANSETRON 4 MG: 2 INJECTION INTRAMUSCULAR; INTRAVENOUS at 09:15

## 2018-12-07 RX ADMIN — ROCURONIUM BROMIDE 10 MG: 10 INJECTION, SOLUTION INTRAVENOUS at 09:04

## 2018-12-07 RX ADMIN — MIDAZOLAM HYDROCHLORIDE 2 MG: 1 INJECTION, SOLUTION INTRAMUSCULAR; INTRAVENOUS at 08:57

## 2018-12-07 RX ADMIN — Medication 80 MCG: at 09:15

## 2018-12-07 RX ADMIN — ROCURONIUM BROMIDE 20 MG: 10 INJECTION, SOLUTION INTRAVENOUS at 09:30

## 2018-12-07 NOTE — BRIEF OP NOTE
BRIEF OPERATIVE NOTE Date of Procedure: 12/7/2018 Preoperative Diagnosis: RECURRENT RIGHT BREAST CANCER, need iv access for chemo Postoperative Diagnosis: same Procedure(s): RIGHT BREAST SKIN EXCISION 
PORT A CATH INSERTION left subclavian Surgeon(s) and Role: Cate Serrano MD - Primary Surgical Assistant: none Surgical Staff: 
Circ-1: Connor Palacio RN Scrub Tech-1: Jorden Caldera Scrub Tech-2: Ann-Marie Morales Event Time In Time Out Incision Start 4558 Incision Close 4159 Anesthesia: General  
Estimated Blood Loss: 20 ml Specimens:  
ID Type Source Tests Collected by Time Destination 1 : Right Chest Wall Skin (Short Superior/Long Lateral) Preservative Chest  Loly Johnson MD 12/7/2018 0275 Pathology Findings: tip of port junction svc and right atrium Complications: none Implants:  
Implant Name Type Inv. Item Serial No.  Lot No. LRB No. Used Action PORT VASC INFUS SET 8FR TI -- SMART PORT CT - SNA  PORT VASC INFUS SET 8FR TI -- SMART PORT CT NA ANGIODYNAM[employer] [id number]  1 Implanted Dictated [id number]

## 2018-12-07 NOTE — OP NOTES
[address] REPORT    Name:Eldon STREETER  MR#: [id number]  : 1960  ACCOUNT #: [de-identified]   DATE OF SERVICE: 2018    PREOPERATIVE DIAGNOSIS:  Recurrent right breast cancer, needs intravenous access for chemotherapy. POSTOPERATIVE DIAGNOSIS:  Recurrent right breast cancer, needs intravenous access for chemotherapy. PROCEDURES PERFORMED:  1. Port-A-Cath insertion, left subclavian. 2.  Right breast skin excision. SURGEON:  Abimael Carbone MD    ASSISTANT:  None. ANESTHESIA:  General.    ESTIMATED BLOOD LOSS:  20 mL. SPECIMENS REMOVED:  Right breast skin, right chest wall skin. FINDINGS:  Tip of port at junction of SVC and right atrium. COMPLICATIONS:  None. IMPLANTS:  An 8-[employer]. INDICATION FOR PROCEDURE:  This is a 72-year-old female who has a history of right breast cancer. She had had mastectomies and reconstruction, and had a recurrent lump excised by Dr. Julieth Frazier. It turned out to be recurrent breast cancer and therefore she needs excision of this. She saw Medical Oncology and the decision was made to do adjuvant chemo at this time, and she needed port placement for chemo. PROCEDURE IN DETAIL:  The patient was seen in the preop holding area where surgical site was marked by surgeon. Informed consent was obtained. She was taken to the operating room and laid in supine position, where general endotracheal anesthesia was induced. Bilateral neck and chest were prepped and draped in the usual fashion. A timeout was performed. Attention was turned to the left chest.  The patient was placed in Trendelenburg position, 20 mL of local anesthetic was injected into the chest wall just inferior to the clavicle. An 18-gauge introducer needle was used to access the left subclavian vein. This was done easily.   The syringe was removed, the wire was threaded through the needle, the needle was removed, and the wire was going toward the SVC and right atrium under fluoroscopy. Next, an incision was made around the wire with a 10 blade. Bovie cautery was used to create the port pocket. A dilator sheath was threaded over the wire. The inner cannula and wire were removed, and the catheter was clamped and threaded through the dilator sheath. The sheath was torn away at 20 cm at the chest wall. The catheter was clamped, cut, and placed on the port. The port was placed in the port pocket. The tip of the port was at the junction of SVC and right atrium under fluoroscopy. Next, this aspirated and flushed well with injectable saline. The port was secured on either side with a 3-0 Prolene, and aspirated and flushed well with final heparin solution. The incision was closed with interrupted 3-0 Vicryl, 4-0 subcuticular Monocryl, and skin glue. Attention was turned to the right chest wall. Just medial to the expander where the prior scar was at 3 o'clock, where the excision had been, elliptical excision including the scar was made with a 15 blade. Bovie cautery was used to dissect down all the way to the chest wall. This was excised and marked short stitch superior, long stitch lateral for permanent pathology. The cavity was irrigated, hemostasis obtained with Bovie cautery, 20 mL of local was injected into the tissue and skin. The incision was closed with interrupted 3-0 Vicryl and 4-0 subcuticular Monocryl. Dermabond was placed on this incision as well as a Telfa and Tegaderm dressing. All sponge, needle, and instrument counts were correct. The patient went to the recovery room in stable condition.       MD TY Hightower / SUPRIYA  D: 12/07/2018 10:02     T: 12/07/2018 11:17  JOB #: [id number]

## 2018-12-07 NOTE — DISCHARGE INSTRUCTIONS
Discharge Instructions from Dr. Gabriel Point    ·   · You may shower, but no hot tubs, swimming pools, or baths until your incision is healed. · No heavy lifting with the affected extremity (nothing greater than 5 pounds), and limit its use for the next 4-5 days. · You may use an ice pack for comfort for the next couple of days, but do not place ice directly on the skin. Rather, use a towel or clothing to serve as a barrier between skin and ice to prevent injury. · If I placed a drain, follow the drain instructions provided, especially as you keep a record of the drain output. · Follow medication instructions carefully. ·   · You will have bruising and swelling  · Watch for signs of infection as listed below. · Redness  · Swelling  · Drainage from the incision or from your nipple that appears infected  · Fever over 101.5 degrees for consecutive readings, or over 99.5 if you are currently undergoing chemotherapy. · Call our office (number is below) for a follow-up appointment. · If you have any problems, our phone number is [phone]            Dermabond Instructions    How to Care for Your Wound after Its Treated with DERMABOND* topical skin Adhesive  DERMABOND* Topical skin adhesive (2-octyl cyanoacrylate) is a sterile, liquid skin adhesive that holds wound edges together. The film will usually remain in place for 5 to 10 days, then naturally fall off your skin. The following will answer some of your questions and provide instructions for proper care for your wound while it is healing:  CHECK WOUND APPEARANCE   Some swelling, redness, and pain are common with all wounds and normally will go away as the wound heals. If swelling, redness, or pain increases or if the wound feels warm to the tough, contact a doctor. Also contact a doctor if the wound edges reopen or separate. REPLACE BANDAGES   If your wound is bandaged, keep the bandage dry.    Replace the dressing daily until the adhesive film has fallen off or if the bandage should become wet, unless otherwise instructed by your physician.  When changing the dressing, do not place tape directly over the DERMABOND* adhesive film, because removing the tape later may also remove the film. AVOID TOPICAL MEDICATIONS   Do not apply liquid or ointment medications or any other product to your wound while the DERMABOND* adhesive film is in place. These may loosen the film before your wound is healed. KEEP WOUND DRY AND PROTECTED   You may occasionally and briefly wet your wound in the shower or bath. Do not soak or scrub your wound, do not swim, and avoid periods of heavy perspiration until the DERMABOND* adhesive has naturally fallen off. After showering or bathing, gently blot your wound dry with a soft towel. If a protective dressing is being used, apply a fresh, dry bandage, being sure to keep the tape off the DERMABOND* adhesive film.  Apply a clean, dry bandage over the wound if necessary to protect it.  Protect your wound from injury until the skin has had sufficient time to heal.   Do not scratch, rub, or pick at the DERMABOND* adhesive film. This may loosen the film before your wound is healed.  Protect the wound from prolonged exposure to sunlight or tanning lamps while the film is in place. If you have any questions or concerns about this product, please consult your doctor. *[employer]                   TO PREVENT AN INFECTION      1. [address] YOUR HANDS     To prevent infection, good handwashing is the most important thing you or your caregiver can do.  Wash your hands with soap and water or use the hand  we gave you before you touch any wounds. 2. SHOWER     Use the antibacterial soap we gave you when you take a shower.  Shower with this soap until your wounds are healed.  To reach all areas of your body, you may need someone to help you.       Dont forget to clean your belly button with every shower. 3.  USE CLEAN SHEETS     Use freshly cleaned sheets on your bed after surgery.  To keep the surgery site clean, do not allow pets to sleep with you while your wound is still healing. 4. STOP SMOKING     Stop smoking, or at least cut back on smoking     Smoking slows your healing. 5.  CONTROL YOUR BLOOD SUGAR     High blood sugars slow wound healing.  If you are diabetic, control your blood sugar levels before and after your surgery. DO NOT TAKE TYLENOL/ACETAMINOPHEN WITH PERCOCET, [facility], [address] TAKE NARCOTIC PAIN MEDICATIONS WITH FOOD     Narcotics tend to be constipating, we suggest taking a stool softener such as Colace or Miralax (follow package instructions). DO NOT DRIVE WHILE TAKING NARCOTIC PAIN MEDICATIONS. DO NOT TAKE SLEEPING MEDICATIONS OR ANTIANXIETY MEDICATIONS WHILE TAKING NARCOTIC PAIN MEDICATIONS,  ESPECIALLY THE NIGHT OF ANESTHESIA! CPAP PATIENTS BE SURE TO WEAR MACHINE WHENEVER NAPPING OR SLEEPING! DISCHARGE SUMMARY from Nurse    The following personal items collected during your admission are returned to you:   Dental Appliance: Dental Appliances: None  Vision: Visual Aid: Glasses(placed in belonging bag per patient)  Hearing Aid:    Jewelry: Jewelry: None  Clothing: Clothing: Other (comment)(belonging bag)  Other Valuables: Other Valuables: Eyeglasses(placed in belonging bag per patient)  Valuables sent to safe:        PATIENT INSTRUCTIONS:    After General Anesthesia or Intravenous Sedation, for 24 hours or while taking prescription Narcotics:        Someone should be with you for the next 24 hours. For your own safety, a responsible adult must drive you home. · Limit your activities  · Recommended activity: Rest today, up with assistance today. Do not climb stairs or shower unattended for the next 24 hours. · Please start with a soft bland diet and advance as tolerated (no nausea) to regular diet.   · If you have a sore throat you should try the following: fluids, warm salt water gargles, or throat lozenges. If it does not improve after several days please follow up with your primary physician. · Do not drive and operate hazardous machinery  · Do not make important personal or business decisions  · Do  not drink alcoholic beverages  · If you have not urinated within 8 hours after discharge, please contact your surgeon on call. Report the following to your surgeon:  · Excessive pain, swelling, redness or odor of or around the surgical area  · Temperature over 100.5  · Nausea and vomiting lasting longer than 4 hours or if unable to take medications  · Any signs of decreased circulation or nerve impairment to extremity: change in color, persistent  numbness, tingling, coldness or increase pain      · You will receive a Post Operative Call from one of the Recovery Room Nurses on the day after your surgery to check on you. It is very important for us to know how you are recovering after your surgery. If you have an issue or need to speak with someone, please call your surgeon, do not wait for the post operative call. · You may receive an e-mail or letter in the mail from Joel regarding your experience with us in the Ambulatory Surgery Unit. Your feedback is valuable to us and we appreciate your participation in the survey. · If the above instructions are not adequate or you are having problems after your surgery, call the physician at their office number. · We wish you a speedy recovery ? What to do at Home:      *  Please give a list of your current medications to your Primary Care Provider. *  Please update this list whenever your medications are discontinued, doses are      changed, or new medications (including over-the-counter products) are added. *  Please carry medication information at all times in case of emergency situations.             These are general instructions for a healthy lifestyle:    No smoking/ No tobacco products/ Avoid exposure to second hand smoke    Surgeon General's Warning:  Quitting smoking now greatly reduces serious risk to your health. Obesity, smoking, and sedentary lifestyle greatly increases your risk for illness    A healthy diet, regular physical exercise & weight monitoring are important for maintaining a healthy lifestyle    You may be retaining fluid if you have a history of heart failure or if you experience any of the following symptoms:  Weight gain of 3 pounds or more overnight or 5 pounds in a week, increased swelling in our hands or feet or shortness of breath while lying flat in bed. Please call your doctor as soon as you notice any of these symptoms; do not wait until your next office visit. Recognize signs and symptoms of STROKE:    B - Balance  E - Eyes    F-  Face looks uneven  A-  Arms unable to move or move even  S-  Speech slurred or non-existent  T-  Time-call 911 as soon as signs and symptoms begin-DO NOT go       Back to bed or wait to see if you get better-TIME IS BRAIN. If you have not received your influenza and/or pneumococcal vaccine, please follow up with your primary care physician. The discharge information has been reviewed with the patient and caregiver. The patient and caregiver verbalized understanding.

## 2018-12-07 NOTE — ANESTHESIA PREPROCEDURE EVALUATION
Anesthetic History No history of anesthetic complications Review of Systems / Medical History Patient summary reviewed, nursing notes reviewed and pertinent labs reviewed Pulmonary Smoker (former, 60 pk yrs) Neuro/Psych Within defined limits Cardiovascular Hypertension Exercise tolerance: >4 METS 
  
GI/Hepatic/Renal 
  
GERD: well controlled Endo/Other Hypothyroidism: well controlled Morbid obesity and cancer (breast cancer with recurrence) Other Findings Comments: BPPV (benign paroxysmal positional vertigo Physical Exam 
 
Airway Mallampati: II 
TM Distance: 4 - 6 cm Neck ROM: normal range of motion, short neck Mouth opening: Normal 
 
 Cardiovascular Rhythm: regular Rate: normal 
 
 
 
 Dental 
No notable dental hx Pulmonary Breath sounds clear to auscultation Abdominal 
GI exam deferred Other Findings Anesthetic Plan ASA: 3 Anesthesia type: general 
 
 
 
 
Induction: Intravenous Anesthetic plan and risks discussed with: Patient

## 2018-12-07 NOTE — PERIOP NOTES
1010: Radiology called to obtain PCX for PortaCath placement verification, pt NPO until results completed. 1022: CXR completed at this time. 1036: pt sister/Nelli Vega at bedside, updated about pt condition. Pt is A&Ox4, talking and answering questions appropriately, pt is NPO until MD notified of CXR results, pt denies pain at this time. 1053: Reviewed pt DC instructions and Rx, safety, diet, activity level, s/s of infection, wound care, pain mgmt and when to call MD with pt and pt sister/Nelli Vega who verbalizes understanding with no further questions. 1100: CXR results pending. 1105: Called to radiology to inquire about CXR results, sp/w Karl Jasmine, who states she will call radiologist to read and to allow 5-10 minutes for the results. 1126: CXR results completed and reviewed by Dr. Jim Edwards pt approved for DC to home and may have PO fluids. Discussed with  scant bleeding at elli cath site, per Dr. Jim Edwards will apply additional Dermabond to site. 1130: pt tolerated PO fluids without N/V or difficulty swallowing. Dermabond applied to pt left chest, elli cath site per MD instructions. 1135: Patient meets discharge criteria and agrees she is ready to go home, sisterJerri  also agrees. 1155: pt dressed in own clothes, ambulated to BR with safe/steady gait, denies dizziness. Pt transported via [facility] by nurse to [facility] to be DC to home with sister.

## 2018-12-07 NOTE — INTERVAL H&P NOTE
H&P Update: 
Steven Nichols was seen and examined. History and physical has been reviewed. The patient has been examined.  There have been no significant clinical changes since the completion of the originally dated History and Physical. 
 
Signed By: Shahram Chang MD   
 December 7, 2018 8:36 AM

## 2018-12-07 NOTE — PERIOP NOTES
Huber Fung Simone 
1960 
[id number] Situation: 
Verbal report given from:  Marjorie Krause CRNA Procedure: Procedure(s): RIGHT BREAST SKIN EXCISIONAL BIOPSY, PORT A CATH INSERTION Background: 
 
Preoperative diagnosis: RECURRENT RIGHT BREAST CANCER Postoperative diagnosis: RECURRENT RIGHT BREAST CANCER :  Dr. Eben Kimble Assistant(s): Circ-1: Fabrice Saha RN Scrub Tech-1: Santana Colunga Scrub Tech-2: Hieu Hernandez Specimens:  
ID Type Source Tests Collected by Time Destination 1 : Right Chest Wall Skin (Short Superior/Long Lateral) Preservative Chest  Ashutosh Levy MD 12/7/2018 3655 Pathology Assessment: 
Intra-procedure medications Anesthesia gave intra-procedure sedation and medications, see anesthesia flow sheet Intravenous fluids: Gwenlyn Blight Vital signs stable Recommendation: 
 
Permission to share finding with Nelli/sister : yes

## 2018-12-07 NOTE — PERIOP NOTES
Permission received to review discharge instructions and discuss private health information with sister Juaquin Tipton.

## 2018-12-20 ENCOUNTER — HOSPITAL ENCOUNTER (OUTPATIENT)
Dept: INFUSION THERAPY | Age: 58
Discharge: HOME | End: 2018-12-20
Payer: COMMERCIAL

## 2018-12-20 VITALS
HEIGHT: 61 IN | BODY MASS INDEX: 47.01 KG/M2 | DIASTOLIC BLOOD PRESSURE: 86 MMHG | OXYGEN SATURATION: 97 % | WEIGHT: 249 LBS | HEART RATE: 80 BPM | RESPIRATION RATE: 18 BRPM | TEMPERATURE: 97.7 F | SYSTOLIC BLOOD PRESSURE: 136 MMHG

## 2018-12-20 DIAGNOSIS — Z17.0 MALIGNANT NEOPLASM OF UPPER-INNER QUADRANT OF RIGHT BREAST IN FEMALE, ESTROGEN RECEPTOR POSITIVE (HCC): Primary | ICD-10-CM

## 2018-12-20 DIAGNOSIS — C50.211 MALIGNANT NEOPLASM OF UPPER-INNER QUADRANT OF RIGHT BREAST IN FEMALE, ESTROGEN RECEPTOR POSITIVE (HCC): Primary | ICD-10-CM

## 2018-12-20 LAB
ALBUMIN SERPL-MCNC: 4.1 G/DL (ref 3.5–5)
ALBUMIN/GLOB SERPL: 1.1 {RATIO} (ref 1.1–2.2)
ALP SERPL-CCNC: 75 U/L (ref 45–117)
ALT SERPL-CCNC: 40 U/L (ref 12–78)
ANION GAP SERPL CALC-SCNC: 6 MMOL/L (ref 5–15)
AST SERPL-CCNC: 33 U/L (ref 15–37)
BASOPHILS # BLD: 0 K/UL (ref 0–0.1)
BASOPHILS NFR BLD: 0 % (ref 0–1)
BILIRUB SERPL-MCNC: 0.3 MG/DL (ref 0.2–1)
BUN SERPL-MCNC: 19 MG/DL (ref 6–20)
BUN/CREAT SERPL: 19 (ref 12–20)
CALCIUM SERPL-MCNC: 9.2 MG/DL (ref 8.5–10.1)
CHLORIDE SERPL-SCNC: 101 MMOL/L (ref 97–108)
CO2 SERPL-SCNC: 28 MMOL/L (ref 21–32)
CREAT SERPL-MCNC: 1.02 MG/DL (ref 0.55–1.02)
DIFFERENTIAL METHOD BLD: NORMAL
EOSINOPHIL # BLD: 0.3 K/UL (ref 0–0.4)
EOSINOPHIL NFR BLD: 4 % (ref 0–7)
ERYTHROCYTE [DISTWIDTH] IN BLOOD BY AUTOMATED COUNT: 12.3 % (ref 11.5–14.5)
GLOBULIN SER CALC-MCNC: 3.8 G/DL (ref 2–4)
GLUCOSE SERPL-MCNC: 103 MG/DL (ref 65–100)
HCT VFR BLD AUTO: 36.2 % (ref 35–47)
HGB BLD-MCNC: 12.8 G/DL (ref 11.5–16)
IMM GRANULOCYTES # BLD: 0 K/UL (ref 0–0.04)
IMM GRANULOCYTES NFR BLD AUTO: 0 % (ref 0–0.5)
LYMPHOCYTES # BLD: 1.9 K/UL (ref 0.8–3.5)
LYMPHOCYTES NFR BLD: 21 % (ref 12–49)
MCH RBC QN AUTO: 33 PG (ref 26–34)
MCHC RBC AUTO-ENTMCNC: 35.4 G/DL (ref 30–36.5)
MCV RBC AUTO: 93.3 FL (ref 80–99)
MONOCYTES # BLD: 0.6 K/UL (ref 0–1)
MONOCYTES NFR BLD: 7 % (ref 5–13)
NEUTS SEG # BLD: 6.1 K/UL (ref 1.8–8)
NEUTS SEG NFR BLD: 68 % (ref 32–75)
NRBC # BLD: 0 K/UL (ref 0–0.01)
NRBC BLD-RTO: 0 PER 100 WBC
PLATELET # BLD AUTO: 333 K/UL (ref 150–400)
PMV BLD AUTO: 9.8 FL (ref 8.9–12.9)
POTASSIUM SERPL-SCNC: 3.6 MMOL/L (ref 3.5–5.1)
PROT SERPL-MCNC: 7.9 G/DL (ref 6.4–8.2)
RBC # BLD AUTO: 3.88 M/UL (ref 3.8–5.2)
SODIUM SERPL-SCNC: 135 MMOL/L (ref 136–145)
WBC # BLD AUTO: 8.9 K/UL (ref 3.6–11)

## 2018-12-20 PROCEDURE — 74011250636 HC RX REV CODE- 250/636: Performed by: INTERNAL MEDICINE

## 2018-12-20 PROCEDURE — 96417 CHEMO IV INFUS EACH ADDL SEQ: CPT

## 2018-12-20 PROCEDURE — 96361 HYDRATE IV INFUSION ADD-ON: CPT

## 2018-12-20 PROCEDURE — 85025 COMPLETE CBC W/AUTO DIFF WBC: CPT

## 2018-12-20 PROCEDURE — 77030012965 HC NDL HUBR BBMI -A

## 2018-12-20 PROCEDURE — 36415 COLL VENOUS BLD VENIPUNCTURE: CPT

## 2018-12-20 PROCEDURE — 96413 CHEMO IV INFUSION 1 HR: CPT

## 2018-12-20 PROCEDURE — 96360 HYDRATION IV INFUSION INIT: CPT

## 2018-12-20 PROCEDURE — 80053 COMPREHEN METABOLIC PANEL: CPT

## 2018-12-20 PROCEDURE — 74011250636 HC RX REV CODE- 250/636

## 2018-12-20 PROCEDURE — 96375 TX/PRO/DX INJ NEW DRUG ADDON: CPT

## 2018-12-20 PROCEDURE — 74011000258 HC RX REV CODE- 258: Performed by: INTERNAL MEDICINE

## 2018-12-20 RX ORDER — PALONOSETRON 0.05 MG/ML
0.25 INJECTION, SOLUTION INTRAVENOUS ONCE
Status: COMPLETED | OUTPATIENT
Start: 2018-12-20 | End: 2018-12-20

## 2018-12-20 RX ORDER — DIPHENHYDRAMINE HYDROCHLORIDE 50 MG/ML
50 INJECTION, SOLUTION INTRAMUSCULAR; INTRAVENOUS AS NEEDED
Status: CANCELLED
Start: 2019-01-10

## 2018-12-20 RX ORDER — HYDROCORTISONE SODIUM SUCCINATE 100 MG/2ML
100 INJECTION, POWDER, FOR SOLUTION INTRAMUSCULAR; INTRAVENOUS AS NEEDED
Status: CANCELLED | OUTPATIENT
Start: 2019-01-10

## 2018-12-20 RX ORDER — SODIUM CHLORIDE 9 MG/ML
25 INJECTION, SOLUTION INTRAVENOUS CONTINUOUS
Status: CANCELLED | OUTPATIENT
Start: 2019-01-10 | End: 2019-01-10

## 2018-12-20 RX ORDER — SODIUM CHLORIDE 9 MG/ML
10 INJECTION INTRAMUSCULAR; INTRAVENOUS; SUBCUTANEOUS AS NEEDED
Status: CANCELLED | OUTPATIENT
Start: 2019-01-10

## 2018-12-20 RX ORDER — SODIUM CHLORIDE 9 MG/ML
10 INJECTION INTRAMUSCULAR; INTRAVENOUS; SUBCUTANEOUS AS NEEDED
Status: ACTIVE | OUTPATIENT
Start: 2018-12-20 | End: 2018-12-20

## 2018-12-20 RX ORDER — ALBUTEROL SULFATE 0.83 MG/ML
2.5 SOLUTION RESPIRATORY (INHALATION) AS NEEDED
Status: CANCELLED
Start: 2019-01-10

## 2018-12-20 RX ORDER — PALONOSETRON 0.05 MG/ML
0.25 INJECTION, SOLUTION INTRAVENOUS ONCE
Status: CANCELLED | OUTPATIENT
Start: 2019-01-10 | End: 2019-01-10

## 2018-12-20 RX ORDER — ACETAMINOPHEN 325 MG/1
650 TABLET ORAL AS NEEDED
Status: CANCELLED
Start: 2019-01-10

## 2018-12-20 RX ORDER — ONDANSETRON 2 MG/ML
8 INJECTION INTRAMUSCULAR; INTRAVENOUS AS NEEDED
Status: CANCELLED | OUTPATIENT
Start: 2019-01-10

## 2018-12-20 RX ORDER — SODIUM CHLORIDE 9 MG/ML
25 INJECTION, SOLUTION INTRAVENOUS CONTINUOUS
Status: DISPENSED | OUTPATIENT
Start: 2018-12-20 | End: 2018-12-20

## 2018-12-20 RX ORDER — HEPARIN 100 UNIT/ML
300-500 SYRINGE INTRAVENOUS AS NEEDED
Status: ACTIVE | OUTPATIENT
Start: 2018-12-20 | End: 2018-12-20

## 2018-12-20 RX ORDER — EPINEPHRINE 1 MG/ML
0.3 INJECTION, SOLUTION, CONCENTRATE INTRAVENOUS AS NEEDED
Status: CANCELLED | OUTPATIENT
Start: 2019-01-10

## 2018-12-20 RX ORDER — SODIUM CHLORIDE 0.9 % (FLUSH) 0.9 %
10 SYRINGE (ML) INJECTION AS NEEDED
Status: CANCELLED
Start: 2019-01-10

## 2018-12-20 RX ORDER — DEXAMETHASONE SODIUM PHOSPHATE 100 MG/10ML
10 INJECTION INTRAMUSCULAR; INTRAVENOUS ONCE
Status: CANCELLED | OUTPATIENT
Start: 2019-01-10 | End: 2019-01-10

## 2018-12-20 RX ORDER — SODIUM CHLORIDE 0.9 % (FLUSH) 0.9 %
10 SYRINGE (ML) INJECTION AS NEEDED
Status: ACTIVE | OUTPATIENT
Start: 2018-12-20 | End: 2018-12-20

## 2018-12-20 RX ORDER — HEPARIN 100 UNIT/ML
300-500 SYRINGE INTRAVENOUS AS NEEDED
Status: CANCELLED
Start: 2019-01-10

## 2018-12-20 RX ADMIN — Medication 10 ML: at 11:05

## 2018-12-20 RX ADMIN — SODIUM CHLORIDE 25 ML/HR: 900 INJECTION, SOLUTION INTRAVENOUS at 12:58

## 2018-12-20 RX ADMIN — DEXAMETHASONE SODIUM PHOSPHATE 20 MG: 4 INJECTION, SOLUTION INTRA-ARTICULAR; INTRALESIONAL; INTRAMUSCULAR; INTRAVENOUS; SOFT TISSUE at 13:15

## 2018-12-20 RX ADMIN — SODIUM CHLORIDE 500 ML: 900 INJECTION, SOLUTION INTRAVENOUS at 11:50

## 2018-12-20 RX ADMIN — PALONOSETRON 0.25 MG: 0.05 INJECTION, SOLUTION INTRAVENOUS at 12:58

## 2018-12-20 RX ADMIN — DOCETAXEL ANHYDROUS 166 MG: 10 INJECTION, SOLUTION INTRAVENOUS at 13:35

## 2018-12-20 RX ADMIN — CYCLOPHOSPHAMIDE 1326 MG: 1 INJECTION, POWDER, FOR SOLUTION INTRAVENOUS; ORAL at 14:40

## 2018-12-20 RX ADMIN — Medication 500 UNITS: at 15:15

## 2018-12-20 RX ADMIN — Medication 10 ML: at 15:15

## 2018-12-20 NOTE — PROGRESS NOTES
Pt arrived to [facility] ambulatory in no acute distress at 1055 for TC C1.  Assessment unremarkable. L chest port accessed without issue and positive blood return noted.  Labs obtained, CBC, CMP. Visit Vitals  /81 (BP 1 Location: Left arm, BP Patient Position: Sitting)   Pulse 93   Temp 97.7 °F (36.5 °C)   Resp 18   Ht 5' 1\" (1.549 m)   Wt 112.9 kg (249 lb)   SpO2 97%   BMI 47.05 kg/m²     Recent Results (from the past 12 hour(s))   CBC WITH AUTOMATED DIFF    Collection Time: 12/20/18 10:59 AM   Result Value Ref Range    WBC 8.9 3.6 - 11.0 K/uL    RBC 3.88 3.80 - 5.20 M/uL    HGB 12.8 11.5 - 16.0 g/dL    HCT 36.2 35.0 - 47.0 %    MCV 93.3 80.0 - 99.0 FL    MCH 33.0 26.0 - 34.0 PG    MCHC 35.4 30.0 - 36.5 g/dL    RDW 12.3 11.5 - 14.5 %    PLATELET 887 661 - 086 K/uL    MPV 9.8 8.9 - 12.9 FL    NRBC 0.0 0  WBC    ABSOLUTE NRBC 0.00 0.00 - 0.01 K/uL    NEUTROPHILS 68 32 - 75 %    LYMPHOCYTES 21 12 - 49 %    MONOCYTES 7 5 - 13 %    EOSINOPHILS 4 0 - 7 %    BASOPHILS 0 0 - 1 %    IMMATURE GRANULOCYTES 0 0.0 - 0.5 %    ABS. NEUTROPHILS 6.1 1.8 - 8.0 K/UL    ABS. LYMPHOCYTES 1.9 0.8 - 3.5 K/UL    ABS. MONOCYTES 0.6 0.0 - 1.0 K/UL    ABS. EOSINOPHILS 0.3 0.0 - 0.4 K/UL    ABS. BASOPHILS 0.0 0.0 - 0.1 K/UL    ABS. IMM. GRANS. 0.0 0.00 - 0.04 K/UL    DF AUTOMATED     METABOLIC PANEL, COMPREHENSIVE    Collection Time: 12/20/18 10:59 AM   Result Value Ref Range    Sodium 135 (L) 136 - 145 mmol/L    Potassium 3.6 3.5 - 5.1 mmol/L    Chloride 101 97 - 108 mmol/L    CO2 28 21 - 32 mmol/L    Anion gap 6 5 - 15 mmol/L    Glucose 103 (H) 65 - 100 mg/dL    BUN 19 6 - 20 MG/DL    Creatinine 1.02 0.55 - 1.02 MG/DL    BUN/Creatinine ratio 19 12 - 20      GFR est AA >60 >60 ml/min/1.73m2    GFR est non-AA 56 (L) >60 ml/min/1.73m2    Calcium 9.2 8.5 - 10.1 MG/DL    Bilirubin, total 0.3 0.2 - 1.0 MG/DL    ALT (SGPT) 40 12 - 78 U/L    AST (SGOT) 33 15 - 37 U/L    Alk.  phosphatase 75 45 - 117 U/L    Protein, total 7.9 6.4 - 8.2 g/dL    Albumin 4.1 3.5 - 5.0 g/dL    Globulin 3.8 2.0 - 4.0 g/dL    A-G Ratio 1.1 1.1 - 2.2       The following medications administered:  NS 500ml bolus (C1 only)  Aloxi 0.25mg IVP  Decadron 20mg IV over 15 minutes  Docetaxel 166mg IV over 1 hour  Cytoxan 1326mg IV over 30 minutes    Visit Vitals  /86   Pulse 80   Temp 97.7 °F (36.5 °C)   Resp 18   Ht 5' 1\" (1.549 m)   Wt 112.9 kg (249 lb)   SpO2 97%   BMI 47.05 kg/m²     Pt tolerated treatment well. Discharge instructions reviewed, handouts given. Port flushed per policy and de-accessed, 2x2 and tape placed.  Pt discharged ambulatory in no acute distress at 1515, accompanied by self. Next appointment 12/21/18 at 1600.

## 2018-12-21 ENCOUNTER — HOSPITAL ENCOUNTER (OUTPATIENT)
Dept: INFUSION THERAPY | Age: 58
Discharge: HOME | End: 2018-12-21
Payer: COMMERCIAL

## 2018-12-21 VITALS
HEART RATE: 101 BPM | TEMPERATURE: 97.8 F | DIASTOLIC BLOOD PRESSURE: 73 MMHG | RESPIRATION RATE: 18 BRPM | SYSTOLIC BLOOD PRESSURE: 141 MMHG

## 2018-12-21 DIAGNOSIS — C50.211 MALIGNANT NEOPLASM OF UPPER-INNER QUADRANT OF RIGHT BREAST IN FEMALE, ESTROGEN RECEPTOR POSITIVE (HCC): Primary | ICD-10-CM

## 2018-12-21 DIAGNOSIS — Z17.0 MALIGNANT NEOPLASM OF UPPER-INNER QUADRANT OF RIGHT BREAST IN FEMALE, ESTROGEN RECEPTOR POSITIVE (HCC): Primary | ICD-10-CM

## 2018-12-21 PROCEDURE — 96372 THER/PROPH/DIAG INJ SC/IM: CPT

## 2018-12-21 PROCEDURE — 74011250636 HC RX REV CODE- 250/636: Performed by: INTERNAL MEDICINE

## 2018-12-21 RX ADMIN — PEGFILGRASTIM 6 MG: 6 INJECTION SUBCUTANEOUS at 14:37

## 2018-12-21 NOTE — PROGRESS NOTES
[facility] Stay Note:  Arrived - 1435  Assessment - unchanged. Visit Vitals  /73 (BP 1 Location: Left arm, BP Patient Position: Sitting)   Pulse (!) 101   Temp 97.8 °F (36.6 °C)   Resp 18     Neulasta 6mg SQ slowly in R arm.  1440 - Tolerated well. No reaction noted. Discharge instructions reviewed, handout given. Pt denies any acute problems/changes. Discharged from [facility] ambulatory. No distress.  Next appt: 1/10/19 at 1100

## 2019-01-05 RX ORDER — OMEPRAZOLE 20 MG/1
CAPSULE, DELAYED RELEASE ORAL
Qty: 30 CAP | Refills: 0 | Status: SHIPPED | OUTPATIENT
Start: 2019-01-05 | End: 2019-03-07 | Stop reason: SDUPTHER

## 2019-01-07 ENCOUNTER — DOCUMENTATION ONLY (OUTPATIENT)
Dept: SURGERY | Age: 59
End: 2019-01-07

## 2019-01-07 NOTE — PROGRESS NOTES
The patient called and spoke to the [facility] requesting an appointment. She told Priscilla Elder she has some redness and drainage coming from the incision. She was offered to see  PANCHO JACKSON on Tuesday, Dr. Jose Enrique Suarez on Wednesday but opted for Thursday with Ishaan Rosales NP due to her infusion schedule and not being able to miss any more work. The patient was appreciative.

## 2019-01-10 ENCOUNTER — OFFICE VISIT (OUTPATIENT)
Dept: ONCOLOGY | Age: 59
End: 2019-01-10

## 2019-01-10 ENCOUNTER — HOSPITAL ENCOUNTER (OUTPATIENT)
Dept: INFUSION THERAPY | Age: 59
Discharge: HOME | End: 2019-01-10
Payer: COMMERCIAL

## 2019-01-10 VITALS
SYSTOLIC BLOOD PRESSURE: 125 MMHG | TEMPERATURE: 97.8 F | HEART RATE: 82 BPM | DIASTOLIC BLOOD PRESSURE: 80 MMHG | OXYGEN SATURATION: 98 % | HEIGHT: 61 IN | WEIGHT: 251.3 LBS | BODY MASS INDEX: 47.45 KG/M2 | RESPIRATION RATE: 18 BRPM

## 2019-01-10 VITALS
HEIGHT: 61 IN | BODY MASS INDEX: 47.45 KG/M2 | TEMPERATURE: 97.8 F | WEIGHT: 251.3 LBS | RESPIRATION RATE: 16 BRPM | DIASTOLIC BLOOD PRESSURE: 80 MMHG | OXYGEN SATURATION: 98 % | SYSTOLIC BLOOD PRESSURE: 122 MMHG | HEART RATE: 89 BPM

## 2019-01-10 DIAGNOSIS — L08.9 SKIN INFECTION: ICD-10-CM

## 2019-01-10 DIAGNOSIS — C50.211 MALIGNANT NEOPLASM OF UPPER-INNER QUADRANT OF RIGHT BREAST IN FEMALE, ESTROGEN RECEPTOR POSITIVE (HCC): Primary | ICD-10-CM

## 2019-01-10 DIAGNOSIS — C50.911 RECURRENT MALIGNANT NEOPLASM OF RIGHT BREAST (HCC): Primary | ICD-10-CM

## 2019-01-10 DIAGNOSIS — C50.211 MALIGNANT NEOPLASM OF UPPER-INNER QUADRANT OF RIGHT BREAST IN FEMALE, ESTROGEN RECEPTOR POSITIVE (HCC): ICD-10-CM

## 2019-01-10 DIAGNOSIS — Z17.0 MALIGNANT NEOPLASM OF UPPER-INNER QUADRANT OF RIGHT BREAST IN FEMALE, ESTROGEN RECEPTOR POSITIVE (HCC): ICD-10-CM

## 2019-01-10 DIAGNOSIS — Z17.0 MALIGNANT NEOPLASM OF UPPER-INNER QUADRANT OF RIGHT BREAST IN FEMALE, ESTROGEN RECEPTOR POSITIVE (HCC): Primary | ICD-10-CM

## 2019-01-10 LAB
ALBUMIN SERPL-MCNC: 4.1 G/DL (ref 3.5–5)
ALBUMIN/GLOB SERPL: 1.2 {RATIO} (ref 1.1–2.2)
ALP SERPL-CCNC: 67 U/L (ref 45–117)
ALT SERPL-CCNC: 39 U/L (ref 12–78)
ANION GAP SERPL CALC-SCNC: 12 MMOL/L (ref 5–15)
AST SERPL-CCNC: 18 U/L (ref 15–37)
BASOPHILS # BLD: 0 K/UL (ref 0–0.1)
BASOPHILS NFR BLD: 0 % (ref 0–1)
BILIRUB SERPL-MCNC: 0.2 MG/DL (ref 0.2–1)
BUN SERPL-MCNC: 21 MG/DL (ref 6–20)
BUN/CREAT SERPL: 20 (ref 12–20)
CALCIUM SERPL-MCNC: 9.1 MG/DL (ref 8.5–10.1)
CHLORIDE SERPL-SCNC: 101 MMOL/L (ref 97–108)
CO2 SERPL-SCNC: 25 MMOL/L (ref 21–32)
CREAT SERPL-MCNC: 1.05 MG/DL (ref 0.55–1.02)
DIFFERENTIAL METHOD BLD: ABNORMAL
EOSINOPHIL # BLD: 0 K/UL (ref 0–0.4)
EOSINOPHIL NFR BLD: 0 % (ref 0–7)
ERYTHROCYTE [DISTWIDTH] IN BLOOD BY AUTOMATED COUNT: 12.7 % (ref 11.5–14.5)
GLOBULIN SER CALC-MCNC: 3.4 G/DL (ref 2–4)
GLUCOSE SERPL-MCNC: 143 MG/DL (ref 65–100)
HCT VFR BLD AUTO: 31 % (ref 35–47)
HGB BLD-MCNC: 10.7 G/DL (ref 11.5–16)
IMM GRANULOCYTES # BLD AUTO: 0.2 K/UL (ref 0–0.04)
IMM GRANULOCYTES NFR BLD AUTO: 1 % (ref 0–0.5)
LYMPHOCYTES # BLD: 0.8 K/UL (ref 0.8–3.5)
LYMPHOCYTES NFR BLD: 5 % (ref 12–49)
MCH RBC QN AUTO: 31.9 PG (ref 26–34)
MCHC RBC AUTO-ENTMCNC: 34.5 G/DL (ref 30–36.5)
MCV RBC AUTO: 92.5 FL (ref 80–99)
MONOCYTES # BLD: 1 K/UL (ref 0–1)
MONOCYTES NFR BLD: 6 % (ref 5–13)
NEUTS SEG # BLD: 14.4 K/UL (ref 1.8–8)
NEUTS SEG NFR BLD: 88 % (ref 32–75)
NRBC # BLD: 0 K/UL (ref 0–0.01)
NRBC BLD-RTO: 0 PER 100 WBC
PLATELET # BLD AUTO: 485 K/UL (ref 150–400)
PMV BLD AUTO: 9.7 FL (ref 8.9–12.9)
POTASSIUM SERPL-SCNC: 3.6 MMOL/L (ref 3.5–5.1)
PROT SERPL-MCNC: 7.5 G/DL (ref 6.4–8.2)
RBC # BLD AUTO: 3.35 M/UL (ref 3.8–5.2)
RBC MORPH BLD: ABNORMAL
SODIUM SERPL-SCNC: 138 MMOL/L (ref 136–145)
WBC # BLD AUTO: 16.4 K/UL (ref 3.6–11)

## 2019-01-10 PROCEDURE — 74011250636 HC RX REV CODE- 250/636: Performed by: INTERNAL MEDICINE

## 2019-01-10 PROCEDURE — 96367 TX/PROPH/DG ADDL SEQ IV INF: CPT

## 2019-01-10 PROCEDURE — 74011250636 HC RX REV CODE- 250/636

## 2019-01-10 PROCEDURE — 96417 CHEMO IV INFUS EACH ADDL SEQ: CPT

## 2019-01-10 PROCEDURE — 96377 APPLICATON ON-BODY INJECTOR: CPT

## 2019-01-10 PROCEDURE — 74011250636 HC RX REV CODE- 250/636: Performed by: NURSE PRACTITIONER

## 2019-01-10 PROCEDURE — 96413 CHEMO IV INFUSION 1 HR: CPT

## 2019-01-10 PROCEDURE — 36415 COLL VENOUS BLD VENIPUNCTURE: CPT

## 2019-01-10 PROCEDURE — 77030012965 HC NDL HUBR BBMI -A

## 2019-01-10 PROCEDURE — 85025 COMPLETE CBC W/AUTO DIFF WBC: CPT

## 2019-01-10 PROCEDURE — 80053 COMPREHEN METABOLIC PANEL: CPT

## 2019-01-10 RX ORDER — SODIUM CHLORIDE 9 MG/ML
25 INJECTION, SOLUTION INTRAVENOUS CONTINUOUS
Status: DISPENSED | OUTPATIENT
Start: 2019-01-10 | End: 2019-01-11

## 2019-01-10 RX ORDER — HEPARIN 100 UNIT/ML
300-500 SYRINGE INTRAVENOUS AS NEEDED
Status: ACTIVE | OUTPATIENT
Start: 2019-01-10 | End: 2019-01-10

## 2019-01-10 RX ORDER — PALONOSETRON 0.05 MG/ML
0.25 INJECTION, SOLUTION INTRAVENOUS ONCE
Status: COMPLETED | OUTPATIENT
Start: 2019-01-10 | End: 2019-01-10

## 2019-01-10 RX ORDER — CEPHALEXIN 500 MG/1
500 CAPSULE ORAL 4 TIMES DAILY
Qty: 28 CAP | Refills: 0 | Status: SHIPPED | OUTPATIENT
Start: 2019-01-10 | End: 2019-01-17

## 2019-01-10 RX ORDER — DEXAMETHASONE SODIUM PHOSPHATE 4 MG/ML
10 INJECTION, SOLUTION INTRA-ARTICULAR; INTRALESIONAL; INTRAMUSCULAR; INTRAVENOUS; SOFT TISSUE ONCE
Status: COMPLETED | OUTPATIENT
Start: 2019-01-10 | End: 2019-01-10

## 2019-01-10 RX ORDER — SODIUM CHLORIDE 0.9 % (FLUSH) 0.9 %
10 SYRINGE (ML) INJECTION AS NEEDED
Status: ACTIVE | OUTPATIENT
Start: 2019-01-10 | End: 2019-01-10

## 2019-01-10 RX ORDER — SODIUM CHLORIDE 9 MG/ML
10 INJECTION INTRAMUSCULAR; INTRAVENOUS; SUBCUTANEOUS AS NEEDED
Status: ACTIVE | OUTPATIENT
Start: 2019-01-10 | End: 2019-01-10

## 2019-01-10 RX ADMIN — SODIUM CHLORIDE 25 ML/HR: 900 INJECTION, SOLUTION INTRAVENOUS at 12:58

## 2019-01-10 RX ADMIN — CYCLOPHOSPHAMIDE 1326 MG: 500 INJECTION, POWDER, FOR SOLUTION INTRAVENOUS; ORAL at 16:05

## 2019-01-10 RX ADMIN — Medication 500 UNITS: at 16:38

## 2019-01-10 RX ADMIN — PEGFILGRASTIM 6 MG: KIT SUBCUTANEOUS at 16:40

## 2019-01-10 RX ADMIN — PALONOSETRON 0.25 MG: 0.05 INJECTION, SOLUTION INTRAVENOUS at 13:00

## 2019-01-10 RX ADMIN — Medication 20 ML: at 12:58

## 2019-01-10 RX ADMIN — DOCETAXEL 166 MG: 10 INJECTION, SOLUTION INTRAVENOUS at 15:09

## 2019-01-10 RX ADMIN — DEXAMETHASONE SODIUM PHOSPHATE 10 MG: 4 INJECTION, SOLUTION INTRA-ARTICULAR; INTRALESIONAL; INTRAMUSCULAR; INTRAVENOUS; SOFT TISSUE at 13:05

## 2019-01-10 NOTE — PROGRESS NOTES
[facility]  [address], [address] [facility] [address] [facility]  [phone]    Oncology Follow Up        Patient: Hermann Severance MRN: [id number]  SSN: [id number]    YOB: 1960  Age: 66 y.o. Sex: female        Diagnosis:     1. Recurrent breast carcinoma   Right chest wall     2. Right breast carcinoma:  T2 N0 M0 (Stage IIA) infiltrating ductal carcinoma, Tumor size 2.2 cm and 3.5 cm, LN -ve, grade 1, ER 95%, LA 90%, Her 2 -ve     Treatment:     1. Adjuvant chemotherapy   TC, cycle 2 day 1  2. Excision of the right sided chest wall/breast mass 10/19/2018  3. B/L mastectomy on 09/25/2015    Subjective:      Vipin Nichols is a 66 y.o. female who I am seeing in follow up for recurrent breast carcinoma. She underwent a screening mammogram in July 2015. Abnormality was noted in the right breast. 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 She elected to undergo b/l mastectomy and had it done on 09/25/2015. She suffered necrosis of skin flaps and had to undergo redo surgeries. After several rounds of salvage surgeries, she is completely healed. She took AI for a few months and then stopped on her own and also did not follow up in the clinic.      She suffered a recurrence on the right chest wall over the reconstructed breast which was excised by Dr. Consuelo Severino on 10/19/2018. The margins are close. She saw Dr. Saran Hairston. She underwent re-excision of the skin around the previous surgical margin. She also underwent a port placement.  She IS receiving adjuvant chemotherapy.          Review of Systems:    Constitutional: negative  Eyes: negative  Ears, Nose, Mouth, Throat, and Face: negative  Respiratory: negative  Cardiovascular: negative  Gastrointestinal: negative  Genitourinary:negative  Integument/Breast: right breast skin irritation  Hematologic/Lymphatic: negative  Musculoskeletal:negative  Neurological: negative        Past Medical History:   Diagnosis Date    Acid reflux     Adverse effect of anesthesia     sister has a lot of allergies and issues with anesthesia per pt    BPPV (benign paroxysmal positional vertigo)     Breast cancer ([facility] [address] 7/2014    right    Environmental allergies     Gallstones     Negative workup in 2009    GERD (gastroesophageal reflux disease)     HTN (hypertension) 2/24/2015    Hypertension 9/19/2013    Hypertension     Hypoactive thyroid     Hypothyroidism 2/24/2015    Mixed hyperlipidemia 5/12/2016    Morbid obesity ([facility] [address]     Prediabetes 5/12/2016     Past Surgical History:   Procedure Laterality Date    HX BREAST BIOPSY Right 12/7/2018    RIGHT BREAST SKIN EXCISIONAL BIOPSY, performed by Malina Gómez MD at [address] HX BREAST RECONSTRUCTION Bilateral 9/24/2015    BREAST RECONSTRUCTION performed by Carlos Alfaro MD at [address] HX BREAST RECONSTRUCTION Right 11/10/2015    REMOVAL RIGHT BREAST TISSUE EXPANDER performed by Carlos Alfaro MD at [facility] MAIN OR    HX BREAST RECONSTRUCTION Right 2/19/2016    PLACEMENT RIGHT TISSUE EXPANDER  performed by Carlos Alfaro MD at [facility] MAIN OR    HX BREAST RECONSTRUCTION Bilateral 10/21/2016    EXCHANGE BILATERAL TISSUE EXPANDER FOR BREAST IMPLANT performed by Carlos Alfaro MD at [facility] MAIN OR    HX [id number] [facility]    Vaginal deliveries    HX LAP CHOLECYSTECTOMY      HX MASTECTOMY Bilateral 9/24/2015    BILATERAL BREAST SKIN SPARING MASTECTOMY, RIGHT SENTINEL NODE BIOPSY/BILATERAL BREAST RECONSTRUCTION W/TISSUE EXPANDERS AND ALLODERM performed by Rakesh Rey MD at [facility] AMBULATORY OR    DC ERCP REMOVE CALCULI/DEBRIS BILIARY/PANCREAS DUCT  3/25/2016         DC ERCP W/SPHINCTEROTOMY/PAPILLOTOMY  3/25/2016           Family History   Problem Relation Age of Onset    Heart Disease Mother     Stroke Father     Asthma Sister     Hypertension Sister Social History     Tobacco Use    Smoking status: Former Smoker     Packs/day: 2.00     Years: 30.00     Pack years: 60.00     Types: Cigarettes     Last attempt to quit: 2013     Years since quittin.0    Smokeless tobacco: Never Used   Substance Use Topics    Alcohol use: Yes     Alcohol/week: 4.2 oz     Types: 7 Glasses of wine per week      Prior to Admission medications    Medication Sig Start Date End Date Taking? Authorizing Provider   cephALEXin (KEFLEX) 500 mg capsule Take 1 Cap by mouth four (4) times daily for 7 days. 1/10/19 1/17/19 Yes Maulik Obrien NP   omeprazole (PRILOSEC) 20 mg capsule Take 20 mg by mouth nightly. 19  Yes Anaid Juárez MD   oxyCODONE-acetaminophen (PERCOCET) 5-325 mg per tablet Take 1 Tab by mouth every four (4) hours as needed for Pain. Max Daily Amount: 6 Tabs. 18  Yes Malina Gómez MD   lidocaine-prilocaine (EMLA) topical cream Apply  to affected area as needed for Pain. 18  Yes Kimberley Ames MD   ondansetron (ZOFRAN ODT) 4 mg disintegrating tablet Take 1 Tab by mouth every eight (8) hours as needed for Nausea. 18  Yes Kimberley Ames MD   dexamethasone (DECADRON) 4 mg tablet Take 8 mg by mouth two (2) times daily (with meals). Take two times a day the day before, day of, and day after chemotherapy. 18  Yes Kimberley Ames MD   diphenhydrAMINE (BENADRYL) 25 mg capsule Take 25 mg by mouth as needed. Yes Provider, Historical   oxymetazoline HCl (OXYMETAZOLINE NA) by Nasal route as needed.    Yes Provider, Historical   atorvastatin (LIPITOR) 10 mg tablet TAKE 1 TABLET BY MOUTH AT BEDTIME 18  Yes Anaid Juárez MD   amLODIPine (NORVASC) 10 mg tablet take 1 tablet by mouth once daily 3/27/18  Yes Anaid Juárez MD   lisinopril-hydroCHLOROthiazide Maximiliano Hemming, ZESTORETIC) 20-12.5 mg per tablet take 2 tablets by mouth once daily 3/27/18  Yes Anaid Juárez MD   levothyroxine (SYNTHROID) 25 mcg tablet take 1 tablet by mouth once daily BEFORE BREAKFAST 3/27/18  Yes Edda Marshall MD              No Known Allergies        Objective:     Vitals:    01/10/19 1501   BP: 122/80   Pulse: 89   Resp: 16   Temp: 97.8 °F (36.6 °C)   TempSrc: Oral   SpO2: 98%   Weight: 251 lb 4.8 oz (114 kg)   Height: 5' 1\" (1.549 m)            Physical Exam:    GENERAL: alert, cooperative  EYE: negative  LYMPHATIC: Cervical, supraclavicular, and axillary nodes normal.   THROAT & NECK: normal and no erythema or exudates noted. LUNG: clear to auscultation bilaterally  HEART: regular rate and rhythm  ABDOMEN: soft, non-tender  EXTREMITIES: no cyanosis or edema  SKIN: red area on medial aspect of suture line   NEUROLOGIC: negative      Lab Results   Component Value Date/Time    WBC 16.4 (H) 01/10/2019 11:28 AM    Hemoglobin (POC) 12.9 02/19/2016 07:35 AM    HGB 10.7 (L) 01/10/2019 11:28 AM    Hematocrit (POC) 38 02/19/2016 07:35 AM    HCT 31.0 (L) 01/10/2019 11:28 AM    PLATELET 165 (H) 42/23/1272 11:28 AM    MCV 92.5 01/10/2019 11:28 AM       Lab Results   Component Value Date/Time    Sodium 138 01/10/2019 11:28 AM    Potassium 3.6 01/10/2019 11:28 AM    Chloride 101 01/10/2019 11:28 AM    CO2 25 01/10/2019 11:28 AM    Anion gap 12 01/10/2019 11:28 AM    Glucose 143 (H) 01/10/2019 11:28 AM    BUN 21 (H) 01/10/2019 11:28 AM    Creatinine 1.05 (H) 01/10/2019 11:28 AM    BUN/Creatinine ratio 20 01/10/2019 11:28 AM    GFR est AA >60 01/10/2019 11:28 AM    GFR est non-AA 54 (L) 01/10/2019 11:28 AM    Calcium 9.1 01/10/2019 11:28 AM    Bilirubin, total 0.2 01/10/2019 11:28 AM    AST (SGOT) 18 01/10/2019 11:28 AM    Alk. phosphatase 67 01/10/2019 11:28 AM    Protein, total 7.5 01/10/2019 11:28 AM    Albumin 4.1 01/10/2019 11:28 AM    Globulin 3.4 01/10/2019 11:28 AM    A-G Ratio 1.2 01/10/2019 11:28 AM    ALT (SGPT) 39 01/10/2019 11:28 AM           Assessment:     1. Recurrent breast carcinoma   Right chest wall     2.  Right breast carcinoma:  T2 N0 M0 (Stage IIA) infiltrating ductal carcinoma, Tumor size 2.2 cm and 3.5 cm, LN -ve, grade 1, ER 95%, AL 90%, Her 2 -ve    ECOG PS 0  Intent of treatment - curative  Prognosis - good    S/P B/L BREAST SKIN SPARING MASTECTOMY, RIGHT SENTINEL NODE BIOPSY, 09/24/2015    Excision of the right sided chest wall/breast mass 10/19/2018    The initial Mammaprint was high risk. Since it took a good 8 months to fully recover from complication of her breast surgery, I did not recommend adjuvant chemotherapy. She was poorly compliant with AI. Due to recurrent breast cancer, she is receiving adjuvant chemotherapy   Taxotere + Cytoxan cycle 2 day 1    Tolerating treatment   A detailed system by system evaluation of side effect was performed to assess chemotherapy related toxicity. Blood counts are acceptable. Results reviewed with the patient. 2. Skin Infection    Red area on suture line medial breast. Patient says the area \"popped\" a few days ago and she had whitish drainage. Currently no drainage. Recommend Keflex 500 mg qid      Plan:       · Proceed with cycle 2 of TC Neulasta 24-72 hours after each cycle of chemotherapy  · Keflex 500 mg qid for skin infection  · Return to clinic 3 weeks        Signed by: Nj Xavier MD                     January 11, 2019        CC. Kip Rosenthal MD  CC.  Solo Perez MD

## 2019-01-10 NOTE — PROGRESS NOTES
1100 Pt arrived at [facility] ambulatory and in no distress for C2 taxotere/cytoxan. Assessment completed. Pt reports 5 days of fatigue and mild nausea after first cycle. Port accessed, labs drawn. Pt expressed frustration at wait time for treatment to get started, so pt seen at Dr. Brandy Mathew office prior to starting treatment. Medications received: 
NS @ [facility] Dexamethasone 10 mg IV 
aloxi 0.25 mg IV 
taxotere 166 mg IV over 1 hour Cytoxan 1326 mg IV over 30 min Neulasta on-Pro 6 mg applied to right arm, pt given written instructions about device Port flushed with NS and heparin and needle removed. Patient Vitals for the past 8 hrs: 
 Temp Pulse Resp BP SpO2  
01/10/19 1631  82 18 125/80   
01/10/19 1116 97.8 °F (36.6 °C) 89 20 122/80 98 % 01/10/19 1108 97.6 °F (36.4 °C) 81 18 125/78   
   
 
1645 Tolerated treatment well, no adverse reaction noted. D/Cd from [facility] ambulatory and in no distress. Next appt 1/31/19 Recent Results (from the past 8 hour(s)) CBC WITH AUTOMATED DIFF Collection Time: 01/10/19 11:28 AM  
Result Value Ref Range WBC 16.4 (H) 3.6 - 11.0 K/uL  
 RBC 3.35 (L) 3.80 - 5.20 M/uL  
 HGB 10.7 (L) 11.5 - 16.0 g/dL HCT 31.0 (L) 35.0 - 47.0 % MCV 92.5 80.0 - 99.0 FL  
 MCH 31.9 26.0 - 34.0 PG  
 MCHC 34.5 30.0 - 36.5 g/dL  
 RDW 12.7 11.5 - 14.5 % PLATELET 145 (H) 186 - 400 K/uL MPV 9.7 8.9 - 12.9 FL  
 NRBC 0.0 0  WBC ABSOLUTE NRBC 0.00 0.00 - 0.01 K/uL NEUTROPHILS 88 (H) 32 - 75 % LYMPHOCYTES 5 (L) 12 - 49 % MONOCYTES 6 5 - 13 % EOSINOPHILS 0 0 - 7 % BASOPHILS 0 0 - 1 % IMMATURE GRANULOCYTES 1 (H) 0.0 - 0.5 % ABS. NEUTROPHILS 14.4 (H) 1.8 - 8.0 K/UL  
 ABS. LYMPHOCYTES 0.8 0.8 - 3.5 K/UL  
 ABS. MONOCYTES 1.0 0.0 - 1.0 K/UL  
 ABS. EOSINOPHILS 0.0 0.0 - 0.4 K/UL  
 ABS. BASOPHILS 0.0 0.0 - 0.1 K/UL  
 ABS. IMM.  GRANS. 0.2 (H) 0.00 - 0.04 K/UL  
 DF AUTOMATED    
 RBC COMMENTS NORMOCYTIC, NORMOCHROMIC    
 METABOLIC PANEL, COMPREHENSIVE Collection Time: 01/10/19 11:28 AM  
Result Value Ref Range Sodium 138 136 - 145 mmol/L Potassium 3.6 3.5 - 5.1 mmol/L Chloride 101 97 - 108 mmol/L  
 CO2 25 21 - 32 mmol/L Anion gap 12 5 - 15 mmol/L Glucose 143 (H) 65 - 100 mg/dL BUN 21 (H) 6 - 20 MG/DL Creatinine 1.05 (H) 0.55 - 1.02 MG/DL  
 BUN/Creatinine ratio 20 12 - 20 GFR est AA >60 >60 ml/min/1.73m2 GFR est non-AA 54 (L) >60 ml/min/1.73m2 Calcium 9.1 8.5 - 10.1 MG/DL Bilirubin, total 0.2 0.2 - 1.0 MG/DL  
 ALT (SGPT) 39 12 - 78 U/L  
 AST (SGOT) 18 15 - 37 U/L Alk. phosphatase 67 45 - 117 U/L Protein, total 7.5 6.4 - 8.2 g/dL Albumin 4.1 3.5 - 5.0 g/dL Globulin 3.4 2.0 - 4.0 g/dL A-G Ratio 1.2 1.1 - 2.2

## 2019-01-10 NOTE — PROGRESS NOTES
Reji Nichols is a 66 y.o. female new patient with recurrent right breast cancer in right breast skin. Patient receiving TC; cycle 2.  Patient very upset and tearful over how long it has taken to get her chemo today; pt reports she has been here since 11am.    Visit Vitals  /80   Pulse 89   Temp 97.8 °F (36.6 °C) (Oral)   Resp 16   Ht 5' 1\" (1.549 m)   Wt 251 lb 4.8 oz (114 kg)   SpO2 98%   BMI 47.48 kg/m²

## 2019-01-11 ENCOUNTER — HOSPITAL ENCOUNTER (OUTPATIENT)
Dept: INFUSION THERAPY | Age: 59
End: 2019-01-11
Payer: COMMERCIAL

## 2019-01-28 RX ORDER — HEPARIN 100 UNIT/ML
300-500 SYRINGE INTRAVENOUS AS NEEDED
Status: CANCELLED
Start: 2019-01-31

## 2019-01-28 RX ORDER — SODIUM CHLORIDE 0.9 % (FLUSH) 0.9 %
10 SYRINGE (ML) INJECTION AS NEEDED
Status: CANCELLED
Start: 2019-02-21

## 2019-01-28 RX ORDER — DIPHENHYDRAMINE HYDROCHLORIDE 50 MG/ML
50 INJECTION, SOLUTION INTRAMUSCULAR; INTRAVENOUS AS NEEDED
Status: CANCELLED
Start: 2019-02-21

## 2019-01-28 RX ORDER — EPINEPHRINE 1 MG/ML
0.3 INJECTION, SOLUTION, CONCENTRATE INTRAVENOUS AS NEEDED
Status: CANCELLED | OUTPATIENT
Start: 2019-01-31

## 2019-01-28 RX ORDER — DEXAMETHASONE SODIUM PHOSPHATE 100 MG/10ML
10 INJECTION INTRAMUSCULAR; INTRAVENOUS ONCE
Status: CANCELLED | OUTPATIENT
Start: 2019-02-21 | End: 2019-02-21

## 2019-01-28 RX ORDER — PALONOSETRON 0.05 MG/ML
0.25 INJECTION, SOLUTION INTRAVENOUS ONCE
Status: CANCELLED | OUTPATIENT
Start: 2019-02-21 | End: 2019-02-21

## 2019-01-28 RX ORDER — DIPHENHYDRAMINE HYDROCHLORIDE 50 MG/ML
50 INJECTION, SOLUTION INTRAMUSCULAR; INTRAVENOUS AS NEEDED
Status: CANCELLED
Start: 2019-01-31

## 2019-01-28 RX ORDER — ONDANSETRON 2 MG/ML
8 INJECTION INTRAMUSCULAR; INTRAVENOUS AS NEEDED
Status: CANCELLED | OUTPATIENT
Start: 2019-01-31

## 2019-01-28 RX ORDER — SODIUM CHLORIDE 0.9 % (FLUSH) 0.9 %
10 SYRINGE (ML) INJECTION AS NEEDED
Status: CANCELLED
Start: 2019-01-31

## 2019-01-28 RX ORDER — ALBUTEROL SULFATE 0.83 MG/ML
2.5 SOLUTION RESPIRATORY (INHALATION) AS NEEDED
Status: CANCELLED
Start: 2019-02-21

## 2019-01-28 RX ORDER — ACETAMINOPHEN 325 MG/1
650 TABLET ORAL AS NEEDED
Status: CANCELLED
Start: 2019-02-21

## 2019-01-28 RX ORDER — ACETAMINOPHEN 325 MG/1
650 TABLET ORAL AS NEEDED
Status: CANCELLED
Start: 2019-01-31

## 2019-01-28 RX ORDER — SODIUM CHLORIDE 9 MG/ML
25 INJECTION, SOLUTION INTRAVENOUS CONTINUOUS
Status: CANCELLED | OUTPATIENT
Start: 2019-01-31 | End: 2019-01-31

## 2019-01-28 RX ORDER — SODIUM CHLORIDE 9 MG/ML
25 INJECTION, SOLUTION INTRAVENOUS CONTINUOUS
Status: CANCELLED | OUTPATIENT
Start: 2019-02-21 | End: 2019-02-21

## 2019-01-28 RX ORDER — SODIUM CHLORIDE 9 MG/ML
10 INJECTION INTRAMUSCULAR; INTRAVENOUS; SUBCUTANEOUS AS NEEDED
Status: CANCELLED | OUTPATIENT
Start: 2019-02-21

## 2019-01-28 RX ORDER — ONDANSETRON 2 MG/ML
8 INJECTION INTRAMUSCULAR; INTRAVENOUS AS NEEDED
Status: CANCELLED | OUTPATIENT
Start: 2019-02-21

## 2019-01-28 RX ORDER — EPINEPHRINE 1 MG/ML
0.3 INJECTION, SOLUTION, CONCENTRATE INTRAVENOUS AS NEEDED
Status: CANCELLED | OUTPATIENT
Start: 2019-02-21

## 2019-01-28 RX ORDER — PALONOSETRON 0.05 MG/ML
0.25 INJECTION, SOLUTION INTRAVENOUS ONCE
Status: CANCELLED | OUTPATIENT
Start: 2019-01-31 | End: 2019-01-31

## 2019-01-28 RX ORDER — HEPARIN 100 UNIT/ML
300-500 SYRINGE INTRAVENOUS AS NEEDED
Status: CANCELLED
Start: 2019-02-21

## 2019-01-28 RX ORDER — DEXAMETHASONE SODIUM PHOSPHATE 100 MG/10ML
10 INJECTION INTRAMUSCULAR; INTRAVENOUS ONCE
Status: CANCELLED | OUTPATIENT
Start: 2019-01-31 | End: 2019-01-31

## 2019-01-28 RX ORDER — HYDROCORTISONE SODIUM SUCCINATE 100 MG/2ML
100 INJECTION, POWDER, FOR SOLUTION INTRAMUSCULAR; INTRAVENOUS AS NEEDED
Status: CANCELLED | OUTPATIENT
Start: 2019-01-31

## 2019-01-28 RX ORDER — ALBUTEROL SULFATE 0.83 MG/ML
2.5 SOLUTION RESPIRATORY (INHALATION) AS NEEDED
Status: CANCELLED
Start: 2019-01-31

## 2019-01-28 RX ORDER — SODIUM CHLORIDE 9 MG/ML
10 INJECTION INTRAMUSCULAR; INTRAVENOUS; SUBCUTANEOUS AS NEEDED
Status: CANCELLED | OUTPATIENT
Start: 2019-01-31

## 2019-01-28 RX ORDER — HYDROCORTISONE SODIUM SUCCINATE 100 MG/2ML
100 INJECTION, POWDER, FOR SOLUTION INTRAMUSCULAR; INTRAVENOUS AS NEEDED
Status: CANCELLED | OUTPATIENT
Start: 2019-02-21

## 2019-01-31 ENCOUNTER — HOSPITAL ENCOUNTER (OUTPATIENT)
Dept: INFUSION THERAPY | Age: 59
Discharge: HOME | End: 2019-01-31
Payer: COMMERCIAL

## 2019-01-31 VITALS
RESPIRATION RATE: 18 BRPM | HEART RATE: 84 BPM | TEMPERATURE: 97.9 F | HEIGHT: 61 IN | BODY MASS INDEX: 47.73 KG/M2 | WEIGHT: 252.8 LBS | SYSTOLIC BLOOD PRESSURE: 159 MMHG | DIASTOLIC BLOOD PRESSURE: 91 MMHG

## 2019-01-31 DIAGNOSIS — Z17.0 MALIGNANT NEOPLASM OF UPPER-INNER QUADRANT OF RIGHT BREAST IN FEMALE, ESTROGEN RECEPTOR POSITIVE (HCC): Primary | ICD-10-CM

## 2019-01-31 DIAGNOSIS — C50.211 MALIGNANT NEOPLASM OF UPPER-INNER QUADRANT OF RIGHT BREAST IN FEMALE, ESTROGEN RECEPTOR POSITIVE (HCC): Primary | ICD-10-CM

## 2019-01-31 LAB
ALBUMIN SERPL-MCNC: 4 G/DL (ref 3.5–5)
ALBUMIN/GLOB SERPL: 1.2 {RATIO} (ref 1.1–2.2)
ALP SERPL-CCNC: 62 U/L (ref 45–117)
ALT SERPL-CCNC: 29 U/L (ref 12–78)
ANION GAP SERPL CALC-SCNC: 11 MMOL/L (ref 5–15)
AST SERPL-CCNC: 9 U/L (ref 15–37)
BASOPHILS # BLD: 0 K/UL (ref 0–0.1)
BASOPHILS NFR BLD: 0 % (ref 0–1)
BILIRUB SERPL-MCNC: 0.3 MG/DL (ref 0.2–1)
BUN SERPL-MCNC: 18 MG/DL (ref 6–20)
BUN/CREAT SERPL: 18 (ref 12–20)
CALCIUM SERPL-MCNC: 9.4 MG/DL (ref 8.5–10.1)
CHLORIDE SERPL-SCNC: 101 MMOL/L (ref 97–108)
CO2 SERPL-SCNC: 24 MMOL/L (ref 21–32)
CREAT SERPL-MCNC: 0.99 MG/DL (ref 0.55–1.02)
DIFFERENTIAL METHOD BLD: ABNORMAL
EOSINOPHIL # BLD: 0 K/UL (ref 0–0.4)
EOSINOPHIL NFR BLD: 0 % (ref 0–7)
ERYTHROCYTE [DISTWIDTH] IN BLOOD BY AUTOMATED COUNT: 14.2 % (ref 11.5–14.5)
GLOBULIN SER CALC-MCNC: 3.3 G/DL (ref 2–4)
GLUCOSE SERPL-MCNC: 168 MG/DL (ref 65–100)
HCT VFR BLD AUTO: 30.3 % (ref 35–47)
HGB BLD-MCNC: 10.4 G/DL (ref 11.5–16)
IMM GRANULOCYTES # BLD AUTO: 0.1 K/UL (ref 0–0.04)
IMM GRANULOCYTES NFR BLD AUTO: 1 % (ref 0–0.5)
LYMPHOCYTES # BLD: 0.6 K/UL (ref 0.8–3.5)
LYMPHOCYTES NFR BLD: 4 % (ref 12–49)
MCH RBC QN AUTO: 33.1 PG (ref 26–34)
MCHC RBC AUTO-ENTMCNC: 34.3 G/DL (ref 30–36.5)
MCV RBC AUTO: 96.5 FL (ref 80–99)
MONOCYTES # BLD: 0.6 K/UL (ref 0–1)
MONOCYTES NFR BLD: 4 % (ref 5–13)
NEUTS SEG # BLD: 13 K/UL (ref 1.8–8)
NEUTS SEG NFR BLD: 91 % (ref 32–75)
NRBC # BLD: 0 K/UL (ref 0–0.01)
NRBC BLD-RTO: 0 PER 100 WBC
PLATELET # BLD AUTO: 454 K/UL (ref 150–400)
PMV BLD AUTO: 9.4 FL (ref 8.9–12.9)
POTASSIUM SERPL-SCNC: 3.8 MMOL/L (ref 3.5–5.1)
PROT SERPL-MCNC: 7.3 G/DL (ref 6.4–8.2)
RBC # BLD AUTO: 3.14 M/UL (ref 3.8–5.2)
RBC MORPH BLD: ABNORMAL
SODIUM SERPL-SCNC: 136 MMOL/L (ref 136–145)
WBC # BLD AUTO: 14.3 K/UL (ref 3.6–11)

## 2019-01-31 PROCEDURE — 74011000250 HC RX REV CODE- 250: Performed by: INTERNAL MEDICINE

## 2019-01-31 PROCEDURE — 74011250636 HC RX REV CODE- 250/636: Performed by: INTERNAL MEDICINE

## 2019-01-31 PROCEDURE — 74011250636 HC RX REV CODE- 250/636

## 2019-01-31 PROCEDURE — 85025 COMPLETE CBC W/AUTO DIFF WBC: CPT

## 2019-01-31 PROCEDURE — 80053 COMPREHEN METABOLIC PANEL: CPT

## 2019-01-31 PROCEDURE — 96375 TX/PRO/DX INJ NEW DRUG ADDON: CPT

## 2019-01-31 PROCEDURE — 96413 CHEMO IV INFUSION 1 HR: CPT

## 2019-01-31 PROCEDURE — 96377 APPLICATON ON-BODY INJECTOR: CPT

## 2019-01-31 PROCEDURE — 77030012965 HC NDL HUBR BBMI -A

## 2019-01-31 PROCEDURE — 96417 CHEMO IV INFUS EACH ADDL SEQ: CPT

## 2019-01-31 PROCEDURE — 36415 COLL VENOUS BLD VENIPUNCTURE: CPT

## 2019-01-31 RX ORDER — SODIUM CHLORIDE 0.9 % (FLUSH) 0.9 %
10 SYRINGE (ML) INJECTION AS NEEDED
Status: ACTIVE | OUTPATIENT
Start: 2019-01-31 | End: 2019-01-31

## 2019-01-31 RX ORDER — SODIUM CHLORIDE 9 MG/ML
25 INJECTION, SOLUTION INTRAVENOUS CONTINUOUS
Status: DISPENSED | OUTPATIENT
Start: 2019-01-31 | End: 2019-02-01

## 2019-01-31 RX ORDER — DEXAMETHASONE SODIUM PHOSPHATE 4 MG/ML
10 INJECTION, SOLUTION INTRA-ARTICULAR; INTRALESIONAL; INTRAMUSCULAR; INTRAVENOUS; SOFT TISSUE ONCE
Status: COMPLETED | OUTPATIENT
Start: 2019-01-31 | End: 2019-01-31

## 2019-01-31 RX ORDER — HEPARIN 100 UNIT/ML
300-500 SYRINGE INTRAVENOUS AS NEEDED
Status: ACTIVE | OUTPATIENT
Start: 2019-01-31 | End: 2019-01-31

## 2019-01-31 RX ORDER — SODIUM CHLORIDE 9 MG/ML
10 INJECTION INTRAMUSCULAR; INTRAVENOUS; SUBCUTANEOUS AS NEEDED
Status: ACTIVE | OUTPATIENT
Start: 2019-01-31 | End: 2019-01-31

## 2019-01-31 RX ORDER — PALONOSETRON 0.05 MG/ML
0.25 INJECTION, SOLUTION INTRAVENOUS ONCE
Status: COMPLETED | OUTPATIENT
Start: 2019-01-31 | End: 2019-01-31

## 2019-01-31 RX ADMIN — Medication 10 ML: at 16:15

## 2019-01-31 RX ADMIN — PEGFILGRASTIM 6 MG: KIT SUBCUTANEOUS at 16:16

## 2019-01-31 RX ADMIN — Medication 10 ML: at 11:33

## 2019-01-31 RX ADMIN — Medication 500 UNITS: at 16:16

## 2019-01-31 RX ADMIN — SODIUM CHLORIDE 10 ML: 9 INJECTION, SOLUTION INTRAMUSCULAR; INTRAVENOUS; SUBCUTANEOUS at 11:33

## 2019-01-31 RX ADMIN — DOCETAXEL ANHYDROUS 166 MG: 10 INJECTION, SOLUTION INTRAVENOUS at 14:33

## 2019-01-31 RX ADMIN — DEXAMETHASONE SODIUM PHOSPHATE 10 MG: 4 INJECTION, SOLUTION INTRA-ARTICULAR; INTRALESIONAL; INTRAMUSCULAR; INTRAVENOUS; SOFT TISSUE at 12:49

## 2019-01-31 RX ADMIN — PALONOSETRON HYDROCHLORIDE 0.25 MG: 0.25 INJECTION INTRAVENOUS at 12:43

## 2019-01-31 RX ADMIN — CYCLOPHOSPHAMIDE 1326 MG: 500 INJECTION, POWDER, FOR SOLUTION INTRAVENOUS; ORAL at 15:38

## 2019-01-31 RX ADMIN — SODIUM CHLORIDE 25 ML/HR: 900 INJECTION, SOLUTION INTRAVENOUS at 12:42

## 2019-01-31 NOTE — PROGRESS NOTES
Pt arrived to [facility] ambulatory for C3 TC in no acute distress at 1105.  Assessment unremarkable except constipation, diarrhea, and fatigue. L chest port accessed without issue and positive blood return noted.  Labs obtained- CBC with diff and CMP. Visit Vitals /89 (BP 1 Location: Left arm, BP Patient Position: Sitting) Pulse 96 Temp 97.9 °F (36.6 °C) Resp 20 Recent Results (from the past 12 hour(s)) CBC WITH AUTOMATED DIFF Collection Time: 01/31/19 11:32 AM  
Result Value Ref Range WBC 14.3 (H) 3.6 - 11.0 K/uL  
 RBC 3.14 (L) 3.80 - 5.20 M/uL  
 HGB 10.4 (L) 11.5 - 16.0 g/dL HCT 30.3 (L) 35.0 - 47.0 % MCV 96.5 80.0 - 99.0 FL  
 MCH 33.1 26.0 - 34.0 PG  
 MCHC 34.3 30.0 - 36.5 g/dL  
 RDW 14.2 11.5 - 14.5 % PLATELET 232 (H) 737 - 400 K/uL MPV 9.4 8.9 - 12.9 FL  
 NRBC 0.0 0  WBC ABSOLUTE NRBC 0.00 0.00 - 0.01 K/uL NEUTROPHILS 91 (H) 32 - 75 % LYMPHOCYTES 4 (L) 12 - 49 % MONOCYTES 4 (L) 5 - 13 % EOSINOPHILS 0 0 - 7 % BASOPHILS 0 0 - 1 % IMMATURE GRANULOCYTES 1 (H) 0.0 - 0.5 % ABS. NEUTROPHILS 13.0 (H) 1.8 - 8.0 K/UL  
 ABS. LYMPHOCYTES 0.6 (L) 0.8 - 3.5 K/UL  
 ABS. MONOCYTES 0.6 0.0 - 1.0 K/UL  
 ABS. EOSINOPHILS 0.0 0.0 - 0.4 K/UL  
 ABS. BASOPHILS 0.0 0.0 - 0.1 K/UL  
 ABS. IMM. GRANS. 0.1 (H) 0.00 - 0.04 K/UL  
 DF AUTOMATED    
 RBC COMMENTS ANISOCYTOSIS 
1+ METABOLIC PANEL, COMPREHENSIVE Collection Time: 01/31/19 11:32 AM  
Result Value Ref Range Sodium 136 136 - 145 mmol/L Potassium 3.8 3.5 - 5.1 mmol/L Chloride 101 97 - 108 mmol/L  
 CO2 24 21 - 32 mmol/L Anion gap 11 5 - 15 mmol/L Glucose 168 (H) 65 - 100 mg/dL BUN 18 6 - 20 MG/DL Creatinine 0.99 0.55 - 1.02 MG/DL  
 BUN/Creatinine ratio 18 12 - 20 GFR est AA >60 >60 ml/min/1.73m2 GFR est non-AA 58 (L) >60 ml/min/1.73m2 Calcium 9.4 8.5 - 10.1 MG/DL  Bilirubin, total 0.3 0.2 - 1.0 MG/DL  
 ALT (SGPT) 29 12 - 78 U/L  
 AST (SGOT) 9 (L) 15 - 37 U/L Alk. phosphatase 62 45 - 117 U/L Protein, total 7.3 6.4 - 8.2 g/dL Albumin 4.0 3.5 - 5.0 g/dL Globulin 3.3 2.0 - 4.0 g/dL A-G Ratio 1.2 1.1 - 2.2 The following medications administered: 
NS @ Jessica [facility] Aloxi 0.25 mg IVP Decadron 10 mg IVP Taxotere 166 mg IV over 60 minutes Cytoxan 1,326 mg IV over 30 minutes Neulasta 6 mg OBI applied to right arm Visit Vitals BP (!) 159/91 Pulse 84 Temp 97.9 °F (36.6 °C) Resp 18 Ht 5' 1\" (1.549 m) Wt 114.7 kg (252 lb 12.8 oz) Breastfeeding? No  
BMI 47.77 kg/m² Pt questioning if Neulasta is needed today due to \"high white count. \"  Per TOBY Thomas NP, give Neulasta- OBI or SQ according to patient preference. Pt informed that MD wants to see her today and she is to go there after [facility] visit. Pt tolerated treatment well, though was upset at how long she was here.  Pt expects a shorter appointment and verbalized that treatment should only take four hours. Explained to patient that it is a process due to collaboration with the lab and pharmacy. No adverse reaction noted. Port flushed per policy and needle removed, 2x2 and paper tape placed.  Pt discharged ambulatory in no acute distress at 1620, accompanied by self. Next appointment 2/21/19 @ 1100.

## 2019-02-01 ENCOUNTER — APPOINTMENT (OUTPATIENT)
Dept: INFUSION THERAPY | Age: 59
End: 2019-02-01
Payer: COMMERCIAL

## 2019-02-01 ENCOUNTER — OFFICE VISIT (OUTPATIENT)
Dept: ONCOLOGY | Age: 59
End: 2019-02-01

## 2019-02-01 ENCOUNTER — HOSPITAL ENCOUNTER (OUTPATIENT)
Dept: INFUSION THERAPY | Age: 59
Discharge: HOME | End: 2019-02-01
Payer: COMMERCIAL

## 2019-02-01 ENCOUNTER — DOCUMENTATION ONLY (OUTPATIENT)
Dept: ONCOLOGY | Age: 59
End: 2019-02-01

## 2019-02-01 VITALS
DIASTOLIC BLOOD PRESSURE: 84 MMHG | SYSTOLIC BLOOD PRESSURE: 136 MMHG | OXYGEN SATURATION: 97 % | RESPIRATION RATE: 18 BRPM | WEIGHT: 252 LBS | HEART RATE: 87 BPM | HEIGHT: 61 IN | TEMPERATURE: 97.4 F | BODY MASS INDEX: 47.58 KG/M2

## 2019-02-01 VITALS
DIASTOLIC BLOOD PRESSURE: 85 MMHG | HEART RATE: 88 BPM | SYSTOLIC BLOOD PRESSURE: 147 MMHG | TEMPERATURE: 98.3 F | RESPIRATION RATE: 18 BRPM

## 2019-02-01 DIAGNOSIS — D64.81 ANEMIA ASSOCIATED WITH CHEMOTHERAPY: ICD-10-CM

## 2019-02-01 DIAGNOSIS — T45.1X5A ANEMIA ASSOCIATED WITH CHEMOTHERAPY: ICD-10-CM

## 2019-02-01 DIAGNOSIS — C50.211 MALIGNANT NEOPLASM OF UPPER-INNER QUADRANT OF RIGHT BREAST IN FEMALE, ESTROGEN RECEPTOR POSITIVE (HCC): ICD-10-CM

## 2019-02-01 DIAGNOSIS — C50.911 RECURRENT MALIGNANT NEOPLASM OF RIGHT BREAST (HCC): Primary | ICD-10-CM

## 2019-02-01 DIAGNOSIS — Z17.0 MALIGNANT NEOPLASM OF UPPER-INNER QUADRANT OF RIGHT BREAST IN FEMALE, ESTROGEN RECEPTOR POSITIVE (HCC): ICD-10-CM

## 2019-02-01 PROCEDURE — 74011250636 HC RX REV CODE- 250/636: Performed by: INTERNAL MEDICINE

## 2019-02-01 PROCEDURE — 96372 THER/PROPH/DIAG INJ SC/IM: CPT

## 2019-02-01 RX ADMIN — PEGFILGRASTIM 6 MG: 6 INJECTION SUBCUTANEOUS at 16:23

## 2019-02-01 NOTE — PROGRESS NOTES
Left voicemail for Pt to call the office to be seen today. She had chemo yesterday and did not follow up at that time. Short message left that she needs to be seen with by the doctor along with Chemo.   Left message to call the office back to schedule

## 2019-02-01 NOTE — PROGRESS NOTES
Pt is a 66 yr old Pt here for routine follow up for breast cancer, she had cycle 3 of TC yesterday, she reports feeling well, but usually has some symptoms of nausea and fatigue on day 2-3. Pt has no pain, Pt reports being angry about having to wait for chemo \"it takes too long\". Process reviewed. Blood pressure 136/84, pulse 87, temperature 97.4 °F (36.3 °C), resp. rate 18, height 5' 1\" (1.549 m), weight 252 lb (114.3 kg), SpO2 97 %. 1. Have you been to the ER, urgent care clinic since your last visit? Hospitalized since your last visit? no    2. Have you seen or consulted any other health care providers outside of the [address] [place] [address] since your last visit? Include any pap smears or colon screening.   No    Health Maintenance reviewed

## 2019-02-01 NOTE — PROGRESS NOTES
1545 - Pt arrived and was instructed to go to MD office prior to injection per MD request. Pt agitated about having to go to office. [facility] short consult note: 
 
1620 Pt arrived to [facility] ambulatory and in no distress for Neulasta. Pt reported OBI came off as soon as she got home yesterday when her arm hit the side of a door. Pt had injector and was placed in bag. /85   Pulse 88   Temp 98.3 °F (36.8 °C)   Resp 18 Medication given: 
Neulasta 1625 Discharged home ambulatory and in no distress. Tolerated procedure well. Next appointment 2/21.

## 2019-02-03 NOTE — PROGRESS NOTES
2001 [facility]  [address], [address] [place] [address], 200 [facility]  [phone]    Oncology Follow Up        Patient: Griselda Dakins MRN: [id number]  SSN: [id number]    YOB: 1960  Age: 66 y.o. Sex: female        Diagnosis:     1. Recurrent breast carcinoma   Right chest wall     2. Right breast carcinoma:  T2 N0 M0 (Stage IIA) infiltrating ductal carcinoma, Tumor size 2.2 cm and 3.5 cm, LN -ve, grade 1, ER 95%, OR 90%, Her 2 -ve     Treatment:     1. Adjuvant chemotherapy   TC, cycle 3 day 1  2. Excision of the right sided chest wall/breast mass 10/19/2018  3. B/L mastectomy on 09/25/2015    Subjective:      Amos Nichols is a 66 y.o. female who I am seeing in follow up for recurrent breast carcinoma. She underwent a screening mammogram in July 2015. Abnormality was noted in the right breast. 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 She elected to undergo b/l mastectomy and had it done on 09/25/2015. She suffered necrosis of skin flaps and had to undergo redo surgeries. After several rounds of salvage surgeries, she is completely healed. She took AI for a few months and then stopped on her own and also did not follow up in the clinic.      She suffered a recurrence on the right chest wall over the reconstructed breast which was excised by Dr. Vaibhav Estrada on 10/19/2018. The margins are close. She saw Dr. Sea Arboleda. She underwent re-excision of the skin around the previous surgical margin. She also underwent a port placement. She is receiving adjuvant chemotherapy.  She feels fair.          Review of Systems:    Constitutional: negative  Eyes: negative  Ears, Nose, Mouth, Throat, and Face: negative  Respiratory: negative  Cardiovascular: negative  Gastrointestinal: negative  Genitourinary:negative  Integument/Breast: negative  Hematologic/Lymphatic: negative  Musculoskeletal:negative  Neurological: negative        Past Medical History:   Diagnosis Date    Acid reflux     Adverse effect of anesthesia     sister has a lot of allergies and issues with anesthesia per pt    BPPV (benign paroxysmal positional vertigo)     Breast cancer ([facility] [address] [facility] 7/2014    right    Environmental allergies     Gallstones     Negative workup in 2009    GERD (gastroesophageal reflux disease)     HTN (hypertension) 2/24/2015    Hypertension 9/19/2013    Hypertension     Hypoactive thyroid     Hypothyroidism 2/24/2015    Mixed hyperlipidemia 5/12/2016    Morbid obesity ([facility] [address]     Prediabetes 5/12/2016     Past Surgical History:   Procedure Laterality Date    HX BREAST BIOPSY Right 12/7/2018    RIGHT BREAST SKIN EXCISIONAL BIOPSY, performed by Jessica Miller MD at [address] HX BREAST RECONSTRUCTION Bilateral 9/24/2015    BREAST RECONSTRUCTION performed by Rakesh Eldridge MD at [address] HX BREAST RECONSTRUCTION Right 11/10/2015    REMOVAL RIGHT BREAST TISSUE EXPANDER performed by Rakesh Eldridge MD at [facility] MAIN OR    HX BREAST RECONSTRUCTION Right 2/19/2016    PLACEMENT RIGHT TISSUE EXPANDER  performed by Rakesh Eldridge MD at [facility] MAIN OR    HX BREAST RECONSTRUCTION Bilateral 10/21/2016    EXCHANGE BILATERAL TISSUE EXPANDER FOR BREAST IMPLANT performed by Rakesh Eldridge MD at [facility] MAIN OR    HX [id number] [facility]    Vaginal deliveries    HX LAP CHOLECYSTECTOMY      HX MASTECTOMY Bilateral 9/24/2015    BILATERAL BREAST SKIN SPARING MASTECTOMY, RIGHT SENTINEL NODE BIOPSY/BILATERAL BREAST RECONSTRUCTION W/TISSUE EXPANDERS AND ALLODERM performed by Dipak Paul MD at [facility] AMBULATORY OR    TX ERCP REMOVE CALCULI/DEBRIS BILIARY/PANCREAS DUCT  3/25/2016         TX ERCP W/SPHINCTEROTOMY/PAPILLOTOMY  3/25/2016           Family History   Problem Relation Age of Onset    Heart Disease Mother     Stroke Father     Asthma Sister     Hypertension Sister Social History     Tobacco Use    Smoking status: Former Smoker     Packs/day: 2.00     Years: 30.00     Pack years: 60.00     Types: Cigarettes     Last attempt to quit: 2013     Years since quittin.0    Smokeless tobacco: Never Used   Substance Use Topics    Alcohol use: Yes     Alcohol/week: 4.2 oz     Types: 7 Glasses of wine per week      Prior to Admission medications    Medication Sig Start Date End Date Taking? Authorizing Provider   omeprazole (PRILOSEC) 20 mg capsule Take 20 mg by mouth nightly. 19  Yes Lorna Serna MD   oxyCODONE-acetaminophen (PERCOCET) 5-325 mg per tablet Take 1 Tab by mouth every four (4) hours as needed for Pain. Max Daily Amount: 6 Tabs. 18  Yes Juliet Schwab, MD   lidocaine-prilocaine (EMLA) topical cream Apply  to affected area as needed for Pain. 18  Yes Hector Childress MD   ondansetron (ZOFRAN ODT) 4 mg disintegrating tablet Take 1 Tab by mouth every eight (8) hours as needed for Nausea. 18  Yes Hector Childress MD   dexamethasone (DECADRON) 4 mg tablet Take 8 mg by mouth two (2) times daily (with meals). Take two times a day the day before, day of, and day after chemotherapy. 18  Yes Hector Childress MD   diphenhydrAMINE (BENADRYL) 25 mg capsule Take 25 mg by mouth as needed. Yes Provider, Historical   oxymetazoline HCl (OXYMETAZOLINE NA) by Nasal route as needed.    Yes Provider, Historical   atorvastatin (LIPITOR) 10 mg tablet TAKE 1 TABLET BY MOUTH AT BEDTIME 18  Yes Lorna Serna MD   amLODIPine (NORVASC) 10 mg tablet take 1 tablet by mouth once daily 3/27/18  Yes Lorna Serna MD   lisinopril-hydroCHLOROthiazide Olivarez Loft, ZESTORETIC) 20-12.5 mg per tablet take 2 tablets by mouth once daily 3/27/18  Yes Lorna Serna MD   levothyroxine (SYNTHROID) 25 mcg tablet take 1 tablet by mouth once daily BEFORE BREAKFAST 3/27/18  Yes Lorna Serna MD              No Known Allergies        Objective: Vitals:    02/01/19 1656   BP: 136/84   Pulse: 87   Resp: 18   Temp: 97.4 °F (36.3 °C)   SpO2: 97%   Weight: 252 lb (114.3 kg)   Height: 5' 1\" (1.549 m)            Physical Exam:    GENERAL: alert, cooperative  EYE: negative  LYMPHATIC: Cervical, supraclavicular, and axillary nodes normal.   THROAT & NECK: normal and no erythema or exudates noted. LUNG: clear to auscultation bilaterally  HEART: regular rate and rhythm  ABDOMEN: soft, non-tender  EXTREMITIES: no cyanosis or edema  SKIN: no rash  NEUROLOGIC: negative      Lab Results   Component Value Date/Time    WBC 14.3 (H) 01/31/2019 11:32 AM    Hemoglobin (POC) 12.9 02/19/2016 07:35 AM    HGB 10.4 (L) 01/31/2019 11:32 AM    Hematocrit (POC) 38 02/19/2016 07:35 AM    HCT 30.3 (L) 01/31/2019 11:32 AM    PLATELET 998 (H) 64/09/0333 11:32 AM    MCV 96.5 01/31/2019 11:32 AM       Lab Results   Component Value Date/Time    Sodium 136 01/31/2019 11:32 AM    Potassium 3.8 01/31/2019 11:32 AM    Chloride 101 01/31/2019 11:32 AM    CO2 24 01/31/2019 11:32 AM    Anion gap 11 01/31/2019 11:32 AM    Glucose 168 (H) 01/31/2019 11:32 AM    BUN 18 01/31/2019 11:32 AM    Creatinine 0.99 01/31/2019 11:32 AM    BUN/Creatinine ratio 18 01/31/2019 11:32 AM    GFR est AA >60 01/31/2019 11:32 AM    GFR est non-AA 58 (L) 01/31/2019 11:32 AM    Calcium 9.4 01/31/2019 11:32 AM    Bilirubin, total 0.3 01/31/2019 11:32 AM    AST (SGOT) 9 (L) 01/31/2019 11:32 AM    Alk. phosphatase 62 01/31/2019 11:32 AM    Protein, total 7.3 01/31/2019 11:32 AM    Albumin 4.0 01/31/2019 11:32 AM    Globulin 3.3 01/31/2019 11:32 AM    A-G Ratio 1.2 01/31/2019 11:32 AM    ALT (SGPT) 29 01/31/2019 11:32 AM           Assessment:     1. Recurrent breast carcinoma   Right chest wall     2.  Right breast carcinoma:  T2 N0 M0 (Stage IIA) infiltrating ductal carcinoma, Tumor size 2.2 cm and 3.5 cm, LN -ve, grade 1, ER 95%, W[place] 90%, Her 2 -ve    ECOG PS 0  Intent of treatment - curative  Prognosis - good    S/P B/L BREAST SKIN SPARING MASTECTOMY, RIGHT SENTINEL NODE BIOPSY, 09/24/2015    Excision of the right sided chest wall/breast mass 10/19/2018    The initial Mammaprint was high risk. Since it took a good 8 months to fully recover from complication of her breast surgery, I did not recommend adjuvant chemotherapy. She was poorly compliant with AI. Due to recurrent breast cancer, she is receiving adjuvant chemotherapy   Taxotere + Cytoxan cycle 3 day 1    Tolerating treatment   A detailed system by system evaluation of side effect was performed to assess chemotherapy related toxicity. Blood counts are acceptable. Results reviewed with the patient. 2. Anemia from chemotherapy    Observation      Plan:       · Continue adjuvant chemotherapy  · Reduce PO steroids  · Return to clinic 3 weeks        Signed by: Clementine Cheadle, MD                     February 3, 2019        CC. Emily Nguyen MD  CC.  Cherry Calderon MD

## 2019-02-21 ENCOUNTER — OFFICE VISIT (OUTPATIENT)
Dept: ONCOLOGY | Age: 59
End: 2019-02-21

## 2019-02-21 ENCOUNTER — HOSPITAL ENCOUNTER (OUTPATIENT)
Dept: INFUSION THERAPY | Age: 59
Discharge: HOME | End: 2019-02-21
Payer: COMMERCIAL

## 2019-02-21 VITALS
SYSTOLIC BLOOD PRESSURE: 140 MMHG | DIASTOLIC BLOOD PRESSURE: 77 MMHG | RESPIRATION RATE: 18 BRPM | TEMPERATURE: 98.8 F | BODY MASS INDEX: 48.01 KG/M2 | HEART RATE: 94 BPM | WEIGHT: 254.3 LBS | OXYGEN SATURATION: 97 % | HEIGHT: 61 IN

## 2019-02-21 DIAGNOSIS — Z17.0 MALIGNANT NEOPLASM OF UPPER-INNER QUADRANT OF RIGHT BREAST IN FEMALE, ESTROGEN RECEPTOR POSITIVE (HCC): ICD-10-CM

## 2019-02-21 DIAGNOSIS — C50.211 MALIGNANT NEOPLASM OF UPPER-INNER QUADRANT OF RIGHT BREAST IN FEMALE, ESTROGEN RECEPTOR POSITIVE (HCC): Primary | ICD-10-CM

## 2019-02-21 DIAGNOSIS — C50.211 MALIGNANT NEOPLASM OF UPPER-INNER QUADRANT OF RIGHT BREAST IN FEMALE, ESTROGEN RECEPTOR POSITIVE (HCC): ICD-10-CM

## 2019-02-21 DIAGNOSIS — Z17.0 MALIGNANT NEOPLASM OF UPPER-INNER QUADRANT OF RIGHT BREAST IN FEMALE, ESTROGEN RECEPTOR POSITIVE (HCC): Primary | ICD-10-CM

## 2019-02-21 DIAGNOSIS — C50.911 RECURRENT MALIGNANT NEOPLASM OF RIGHT BREAST (HCC): Primary | ICD-10-CM

## 2019-02-21 LAB
ALBUMIN SERPL-MCNC: 3.9 G/DL (ref 3.5–5)
ALBUMIN/GLOB SERPL: 1.3 {RATIO} (ref 1.1–2.2)
ALP SERPL-CCNC: 65 U/L (ref 45–117)
ALT SERPL-CCNC: 37 U/L (ref 12–78)
ANION GAP SERPL CALC-SCNC: 8 MMOL/L (ref 5–15)
AST SERPL-CCNC: 21 U/L (ref 15–37)
BASOPHILS # BLD: 0.1 K/UL (ref 0–0.1)
BASOPHILS NFR BLD: 1 % (ref 0–1)
BILIRUB SERPL-MCNC: 0.3 MG/DL (ref 0.2–1)
BUN SERPL-MCNC: 18 MG/DL (ref 6–20)
BUN/CREAT SERPL: 19 (ref 12–20)
CALCIUM SERPL-MCNC: 9.2 MG/DL (ref 8.5–10.1)
CHLORIDE SERPL-SCNC: 103 MMOL/L (ref 97–108)
CO2 SERPL-SCNC: 25 MMOL/L (ref 21–32)
CREAT SERPL-MCNC: 0.96 MG/DL (ref 0.55–1.02)
DIFFERENTIAL METHOD BLD: ABNORMAL
EOSINOPHIL # BLD: 0 K/UL (ref 0–0.4)
EOSINOPHIL NFR BLD: 0 % (ref 0–7)
ERYTHROCYTE [DISTWIDTH] IN BLOOD BY AUTOMATED COUNT: 15.3 % (ref 11.5–14.5)
GLOBULIN SER CALC-MCNC: 3 G/DL (ref 2–4)
GLUCOSE SERPL-MCNC: 131 MG/DL (ref 65–100)
HCT VFR BLD AUTO: 29.2 % (ref 35–47)
HGB BLD-MCNC: 9.9 G/DL (ref 11.5–16)
IMM GRANULOCYTES # BLD AUTO: 0.1 K/UL (ref 0–0.04)
IMM GRANULOCYTES NFR BLD AUTO: 1 % (ref 0–0.5)
LYMPHOCYTES # BLD: 0.7 K/UL (ref 0.8–3.5)
LYMPHOCYTES NFR BLD: 6 % (ref 12–49)
MCH RBC QN AUTO: 33 PG (ref 26–34)
MCHC RBC AUTO-ENTMCNC: 33.9 G/DL (ref 30–36.5)
MCV RBC AUTO: 97.3 FL (ref 80–99)
MONOCYTES # BLD: 0.3 K/UL (ref 0–1)
MONOCYTES NFR BLD: 3 % (ref 5–13)
NEUTS SEG # BLD: 9.7 K/UL (ref 1.8–8)
NEUTS SEG NFR BLD: 89 % (ref 32–75)
NRBC # BLD: 0 K/UL (ref 0–0.01)
NRBC BLD-RTO: 0 PER 100 WBC
PLATELET # BLD AUTO: 350 K/UL (ref 150–400)
PMV BLD AUTO: 9.3 FL (ref 8.9–12.9)
POTASSIUM SERPL-SCNC: 4 MMOL/L (ref 3.5–5.1)
PROT SERPL-MCNC: 6.9 G/DL (ref 6.4–8.2)
RBC # BLD AUTO: 3 M/UL (ref 3.8–5.2)
RBC MORPH BLD: ABNORMAL
SODIUM SERPL-SCNC: 136 MMOL/L (ref 136–145)
WBC # BLD AUTO: 10.9 K/UL (ref 3.6–11)

## 2019-02-21 PROCEDURE — 74011250636 HC RX REV CODE- 250/636: Performed by: INTERNAL MEDICINE

## 2019-02-21 PROCEDURE — 96413 CHEMO IV INFUSION 1 HR: CPT

## 2019-02-21 PROCEDURE — 36415 COLL VENOUS BLD VENIPUNCTURE: CPT

## 2019-02-21 PROCEDURE — 96377 APPLICATON ON-BODY INJECTOR: CPT

## 2019-02-21 PROCEDURE — 96417 CHEMO IV INFUS EACH ADDL SEQ: CPT

## 2019-02-21 PROCEDURE — 96375 TX/PRO/DX INJ NEW DRUG ADDON: CPT

## 2019-02-21 PROCEDURE — 80053 COMPREHEN METABOLIC PANEL: CPT

## 2019-02-21 PROCEDURE — 74011250636 HC RX REV CODE- 250/636

## 2019-02-21 PROCEDURE — 77030012965 HC NDL HUBR BBMI -A

## 2019-02-21 PROCEDURE — 85025 COMPLETE CBC W/AUTO DIFF WBC: CPT

## 2019-02-21 RX ORDER — HEPARIN 100 UNIT/ML
300-500 SYRINGE INTRAVENOUS AS NEEDED
Status: ACTIVE | OUTPATIENT
Start: 2019-02-21 | End: 2019-02-21

## 2019-02-21 RX ORDER — SODIUM CHLORIDE 9 MG/ML
25 INJECTION, SOLUTION INTRAVENOUS CONTINUOUS
Status: DISPENSED | OUTPATIENT
Start: 2019-02-21 | End: 2019-02-21

## 2019-02-21 RX ORDER — PALONOSETRON 0.05 MG/ML
0.25 INJECTION, SOLUTION INTRAVENOUS ONCE
Status: COMPLETED | OUTPATIENT
Start: 2019-02-21 | End: 2019-02-21

## 2019-02-21 RX ORDER — SODIUM CHLORIDE 9 MG/ML
10 INJECTION INTRAMUSCULAR; INTRAVENOUS; SUBCUTANEOUS AS NEEDED
Status: ACTIVE | OUTPATIENT
Start: 2019-02-21 | End: 2019-02-21

## 2019-02-21 RX ORDER — SODIUM CHLORIDE 0.9 % (FLUSH) 0.9 %
10 SYRINGE (ML) INJECTION AS NEEDED
Status: ACTIVE | OUTPATIENT
Start: 2019-02-21 | End: 2019-02-21

## 2019-02-21 RX ADMIN — SODIUM CHLORIDE, PRESERVATIVE FREE 500 UNITS: 5 INJECTION INTRAVENOUS at 15:29

## 2019-02-21 RX ADMIN — PALONOSETRON HYDROCHLORIDE 0.25 MG: 0.25 INJECTION, SOLUTION INTRAVENOUS at 12:33

## 2019-02-21 RX ADMIN — Medication 10 ML: at 15:29

## 2019-02-21 RX ADMIN — SODIUM CHLORIDE 25 ML/HR: 900 INJECTION, SOLUTION INTRAVENOUS at 12:33

## 2019-02-21 RX ADMIN — PEGFILGRASTIM 6 MG: KIT SUBCUTANEOUS at 15:33

## 2019-02-21 RX ADMIN — CYCLOPHOSPHAMIDE 1326 MG: 500 INJECTION, POWDER, FOR SOLUTION INTRAVENOUS; ORAL at 14:53

## 2019-02-21 RX ADMIN — DOCETAXEL ANHYDROUS 166 MG: 10 INJECTION, SOLUTION INTRAVENOUS at 13:40

## 2019-02-21 RX ADMIN — Medication 10 ML: at 11:03

## 2019-02-21 NOTE — PROGRESS NOTES
Pt arrived to [facility] ambulatory in no acute distress at 1055 for TC C4/4.  Assessment unremarkable. L chest port accessed without issue and positive blood return noted.  Labs obtained, CBC, CMP. Visit Vitals  /75 (BP 1 Location: Left arm, BP Patient Position: Sitting)   Pulse 97   Temp 98.8 °F (37.1 °C)   Resp 18   Ht 5' 1\" (1.549 m)   Wt 115.3 kg (254 lb 4.8 oz)   SpO2 97%   BMI 48.05 kg/m²     Recent Results (from the past 12 hour(s))   CBC WITH AUTOMATED DIFF    Collection Time: 02/21/19 10:59 AM   Result Value Ref Range    WBC 10.9 3.6 - 11.0 K/uL    RBC 3.00 (L) 3.80 - 5.20 M/uL    HGB 9.9 (L) 11.5 - 16.0 g/dL    HCT 29.2 (L) 35.0 - 47.0 %    MCV 97.3 80.0 - 99.0 FL    MCH 33.0 26.0 - 34.0 PG    MCHC 33.9 30.0 - 36.5 g/dL    RDW 15.3 (H) 11.5 - 14.5 %    PLATELET 686 219 - 042 K/uL    MPV 9.3 8.9 - 12.9 FL    NRBC 0.0 0  WBC    ABSOLUTE NRBC 0.00 0.00 - 0.01 K/uL    NEUTROPHILS 89 (H) 32 - 75 %    LYMPHOCYTES 6 (L) 12 - 49 %    MONOCYTES 3 (L) 5 - 13 %    EOSINOPHILS 0 0 - 7 %    BASOPHILS 1 0 - 1 %    IMMATURE GRANULOCYTES 1 (H) 0.0 - 0.5 %    ABS. NEUTROPHILS 9.7 (H) 1.8 - 8.0 K/UL    ABS. LYMPHOCYTES 0.7 (L) 0.8 - 3.5 K/UL    ABS. MONOCYTES 0.3 0.0 - 1.0 K/UL    ABS. EOSINOPHILS 0.0 0.0 - 0.4 K/UL    ABS. BASOPHILS 0.1 0.0 - 0.1 K/UL    ABS. IMM.  GRANS. 0.1 (H) 0.00 - 0.04 K/UL    DF AUTOMATED      RBC COMMENTS ANISOCYTOSIS  1+       METABOLIC PANEL, COMPREHENSIVE    Collection Time: 02/21/19 10:59 AM   Result Value Ref Range    Sodium 136 136 - 145 mmol/L    Potassium 4.0 3.5 - 5.1 mmol/L    Chloride 103 97 - 108 mmol/L    CO2 25 21 - 32 mmol/L    Anion gap 8 5 - 15 mmol/L    Glucose 131 (H) 65 - 100 mg/dL    BUN 18 6 - 20 MG/DL    Creatinine 0.96 0.55 - 1.02 MG/DL    BUN/Creatinine ratio 19 12 - 20      GFR est AA >60 >60 ml/min/1.73m2    GFR est non-AA >60 >60 ml/min/1.73m2    Calcium 9.2 8.5 - 10.1 MG/DL    Bilirubin, total 0.3 0.2 - 1.0 MG/DL    ALT (SGPT) 37 12 - 78 U/L    AST (SGOT) 21 15 - 37 U/L    Alk. phosphatase 65 45 - 117 U/L    Protein, total 6.9 6.4 - 8.2 g/dL    Albumin 3.9 3.5 - 5.0 g/dL    Globulin 3.0 2.0 - 4.0 g/dL    A-G Ratio 1.3 1.1 - 2.2       The following medications administered:  [email or web address]  Aloxi 0.25mg IVP  Pt taking Decadron at home today  Docetaxel 166mg IV over 1 hour  Cytoxan 1326mg IV over 30 minutes  Neulasta On Body Injector applied    Reported off to HenryRN at 1416    Pt tolerated treatment well. Discharge instructions reviewed, handout given. Port flushed per policy and de-accessed, 2x2 and tape placed.  Pt discharged ambulatory in no acute distress, accompanied by self. No further appointments.

## 2019-02-21 NOTE — PROGRESS NOTES
2001 [facility]  [address], [address], [address] [facility]  [phone]    Oncology Follow Up        Patient: Suzi Montiel MRN: [id number]  SSN: [id number]    YOB: 1960  Age: 66 y.o. Sex: female        Diagnosis:     1. Recurrent breast carcinoma   Right chest wall     2. Right breast carcinoma:  T2 N0 M0 (Stage IIA) infiltrating ductal carcinoma, Tumor size 2.2 cm and 3.5 cm, LN -ve, grade 1, ER 95%, SD 90%, Her 2 -ve     Treatment:     1. Adjuvant chemotherapy   TC, cycle 4 day 1  2. Excision of the right sided chest wall/breast mass 10/19/2018  3. B/L mastectomy on 09/25/2015    Subjective:      Elle Nichols is a 66 y.o. female who I am seeing in follow up for recurrent breast carcinoma. She underwent a screening mammogram in July 2015. Abnormality was noted in the right breast. 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 She elected to undergo b/l mastectomy and had it done on 09/25/2015. She suffered necrosis of skin flaps and had to undergo redo surgeries. After several rounds of salvage surgeries, she is completely healed. She took AI for a few months and then stopped on her own and also did not follow up in the clinic.      She suffered a recurrence on the right chest wall over the reconstructed breast which was excised by Dr. Heide Alejandro on 10/19/2018. The margins are close. She saw Dr. Debora Dahl. She underwent re-excision of the skin around the previous surgical margin. She also underwent a port placement. She is receiving her last dose of adjuvant chemotherapy today. She is complaining of allergies and is using mucinex.      Review of Systems:    Constitutional: negative  Eyes: negative  Ears, Nose, Mouth, Throat, and Face: negative  Respiratory: negative  Cardiovascular: negative  Gastrointestinal: negative  Genitourinary:negative  Integument/Breast: negative  Hematologic/Lymphatic: negative  Musculoskeletal:negative  Neurological: negative        Past Medical History:   Diagnosis Date    Acid reflux     Adverse effect of anesthesia     sister has a lot of allergies and issues with anesthesia per pt    BPPV (benign paroxysmal positional vertigo)     Breast cancer ([facility] [address] 7/2014    right    Environmental allergies     Gallstones     Negative workup in 2009    GERD (gastroesophageal reflux disease)     HTN (hypertension) 2/24/2015    Hypertension 9/19/2013    Hypertension     Hypoactive thyroid     Hypothyroidism 2/24/2015    Mixed hyperlipidemia 5/12/2016    Morbid obesity ([facility] [address]     Prediabetes 5/12/2016     Past Surgical History:   Procedure Laterality Date    HX BREAST BIOPSY Right 12/7/2018    RIGHT BREAST SKIN EXCISIONAL BIOPSY, performed by Jessica Miller MD at [address] HX BREAST RECONSTRUCTION Bilateral 9/24/2015    BREAST RECONSTRUCTION performed by Rakesh Eldridge MD at [address] HX BREAST RECONSTRUCTION Right 11/10/2015    REMOVAL RIGHT BREAST TISSUE EXPANDER performed [facility] MA[facility]    HX BREAST RECONSTRUCTION Right 2/19/2016    PLACEMENT RIGHT TISSUE EXPANDER  performed by [facility]h Eldridge MD at hospitals MAIN OR    HX BREAST RECONSTRUCTION Bilateral 10/21/2016    EXCHANGE BILATERAL TISSUE EXPANDER FOR BREAST IMPLANT performed by Rakesh Eldridge MD at hospitals MAIN OR    HX [id number] [facility]    Vaginal deliveries    HX LAP CHOLECYSTECTOMY      HX MASTECTOMY Bilateral 9/24/2015    BILATERAL BREAST SKIN SPARING MASTECTOMY, RIGHT SENTINEL NODE BIOPSY/BILATERAL BREAST RECONSTRUCTION W/TISSUE EXPANDERS AND ALLODERM performed by Dipak Paul MD at hospitals AMBULATORY OR    [place] ERCP REMOVE CALCULI/DEBRIS BILIARY/PANCREAS DUCT  3/25/2016         [place] ERCP W/SPHINCTEROTOMY/PAPILLOTOMY  3/25/2016           Family History   Problem Relation Age of Onset    Heart Disease Mother     Stroke Father     Asthma Sister     Hypertension Sister      Social History     Tobacco Use    Smoking status: Former Smoker     Packs/day: 2.00     Years: 30.00     Pack years: 60.00     Types: Cigarettes     Last attempt to quit: 2013     Years since quittin.1    Smokeless tobacco: Never Used   Substance Use Topics    Alcohol use: Yes     Alcohol/week: 4.2 oz     Types: 7 Glasses of wine per week      Prior to Admission medications    Medication Sig Start Date End Date Taking? Authorizing Provider   omeprazole (PRILOSEC) 20 mg capsule Take 20 mg by mouth nightly. 19   Susan Kendrick MD   oxyCODONE-acetaminophen (PERCOCET) 5-325 mg per tablet Take 1 Tab by mouth every four (4) hours as needed for Pain. Max Daily Amount: 6 Tabs. 18   Jessica Miller MD   lidocaine-prilocaine (EMLA) topical cream Apply  to affected area as needed for Pain. 18   Ashley Jones MD   ondansetron (ZOFRAN ODT) 4 mg disintegrating tablet Take 1 Tab by mouth every eight (8) hours as needed for Nausea. 18   Ashley Jones MD   dexamethasone (DECADRON) 4 mg tablet Take 8 mg by mouth two (2) times daily (with meals). Take two times a day the day before, day of, and day after chemotherapy. 18   Ashley Jones MD   diphenhydrAMINE (BENADRYL) 25 mg capsule Take 25 mg by mouth as needed. Provider, Historical   oxymetazoline HCl (OXYMETAZOLINE NA) by Nasal route as needed.     Provider, Historical   atorvastatin (LIPITOR) 10 mg tablet TAKE 1 TABLET BY MOUTH AT BEDTIME 18   Susan Kendrick MD   amLODIPine (NORVASC) 10 mg tablet take 1 tablet by mouth once daily 3/27/18   Susan Kendrick MD   lisinopril-hydroCHLOROthiazide Judit Davis ZESTORETIC) 20-12.5 mg per tablet take 2 tablets by mouth once daily 3/27/18   Susan Kendrick MD   levothyroxine (SYNTHROID) 25 mcg tablet take 1 tablet by mouth once daily BEFORE BREAKFAST 3/27/18   Susan Kendrick MD        No Known Allergies      Objective:     Vitals: 02/22/19 1635   BP: 139/75   Pulse: 97   Resp: 18   Temp: 98.8 °F (37.1 °C)   SpO2: 97%   Weight: 254 lb (115.2 kg)   Height: 5' 1\" (1.549 m)        Physical Exam:    GENERAL: alert, cooperative  EYE: negative  LYMPHATIC: Cervical, supraclavicular, and axillary nodes normal.   THROAT & NECK: normal and no erythema or exudates noted. LUNG: clear to auscultation bilaterally  HEART: regular rate and rhythm  ABDOMEN: soft, non-tender  EXTREMITIES: no cyanosis or edema  SKIN: no rash  NEUROLOGIC: negative      Lab Results   Component Value Date/Time    WBC 10.9 02/21/2019 10:59 AM    Hemoglobin (POC) 12.9 02/19/2016 07:35 AM    HGB 9.9 (L) 02/21/2019 10:59 AM    Hematocrit (POC) 38 02/19/2016 07:35 AM    HCT 29.2 (L) 02/21/2019 10:59 AM    PLATELET 658 64/35/8694 10:59 AM    MCV 97.3 02/21/2019 10:59 AM       Lab Results   Component Value Date/Time    Sodium 136 02/21/2019 10:59 AM    Potassium 4.0 02/21/2019 10:59 AM    Chloride 103 02/21/2019 10:59 AM    CO2 25 02/21/2019 10:59 AM    Anion gap 8 02/21/2019 10:59 AM    Glucose 131 (H) 02/21/2019 10:59 AM    BUN 18 02/21/2019 10:59 AM    Creatinine 0.96 02/21/2019 10:59 AM    BUN/Creatinine ratio 19 02/21/2019 10:59 AM    GFR est AA >60 02/21/2019 10:59 AM    GFR est non-AA >60 02/21/2019 10:59 AM    Calcium 9.2 02/21/2019 10:59 AM    Bilirubin, total 0.3 02/21/2019 10:59 AM    AST (SGOT) 21 02/21/2019 10:59 AM    Alk. phosphatase 65 02/21/2019 10:59 AM    Protein, total 6.9 02/21/2019 10:59 AM    Albumin 3.9 02/21/2019 10:59 AM    Globulin 3.0 02/21/2019 10:59 AM    A-G Ratio 1.3 02/21/2019 10:59 AM    ALT (SGPT) 37 02/21/2019 10:59 AM         Assessment:     1. Recurrent breast carcinoma   Right chest wall     2.  Right breast carcinoma:  T2 N0 M0 (Stage IIA) infiltrating ductal carcinoma, Tumor size 2.2 cm and 3.5 cm, LN -ve, grade 1, ER 95%, [place] 90%, Her 2 -ve    ECOG PS 0  Intent of treatment - curative  Prognosis - good    S/P B/L BREAST SKIN SPARING MASTECTOMY, RIGHT SENTINEL NODE BIOPSY, 09/24/2015    Excision of the right sided chest wall/breast mass 10/19/2018    The initial Mammaprint was high risk. Since it took a good 8 months to fully recover from complication of her breast surgery, I did not recommend adjuvant chemotherapy. She was poorly compliant with AI. Due to recurrent breast cancer, she is receiving adjuvant chemotherapy   Taxotere + Cytoxan cycle 4 day 1    Tolerating treatment   A detailed system by system evaluation of side effect was performed to assess chemotherapy related toxicity. Blood counts are acceptable. Results reviewed with the patient. 2. Anemia from chemotherapy    Observation      Plan:     · Continue adjuvant chemotherapy - last treatment today  · Appointment with Dr. Winsome Mojica for radiation  · Will need AI after completion of radiation  · Port can be removed by Dr. Brandy Romero  · Follow up in 3 months      Signed by: Alistair Miner MD                     March 6, 2019        CC. Onur Louis MD  CC.  Kiara York MD

## 2019-02-21 NOTE — PROGRESS NOTES
Pt arrived at [facility] ambulatory and in no distress for TC C4/4. Assessment unremarkable, no new complaints voiced. Assumed care @ [phone] report received from Samuel Becker RN. Visit Vitals  /77 (BP 1 Location: Left arm, BP Patient Position: At rest)   Pulse 94   Temp 98.8 °F (37.1 °C)   Resp 18   Ht 5' 1\" (1.549 m)   Wt 115.3 kg (254 lb 4.8 oz)   SpO2 97%   BMI 48.05 kg/m²     Docetaxel completed @ 1440. Cytoxan 1326 mg IV infused over 30 min    Neulasta On-Body applied to Left arm (secured with Tegaderm and stabilization device) Pt instructed that Neulasta OBI will begin injection of medication at 630 pm on 2/22/19 and it takes about 45 min to administer medication, and to remove it 2 hours after administration is complete. Pt verbalized understanding of information and handout given with to her. 1540 Tolerated treatment well, no adverse reaction noted. Left Chest port flushed per policy and celestin needle removed D/Cd from [facility] ambulatory and in no distress accompanied by Self. No further appointments.

## 2019-02-22 ENCOUNTER — TELEPHONE (OUTPATIENT)
Dept: ONCOLOGY | Age: 59
End: 2019-02-22

## 2019-02-22 VITALS
RESPIRATION RATE: 18 BRPM | BODY MASS INDEX: 47.95 KG/M2 | TEMPERATURE: 98.8 F | WEIGHT: 254 LBS | OXYGEN SATURATION: 97 % | HEIGHT: 61 IN | DIASTOLIC BLOOD PRESSURE: 75 MMHG | HEART RATE: 97 BPM | SYSTOLIC BLOOD PRESSURE: 139 MMHG

## 2019-02-22 NOTE — TELEPHONE ENCOUNTER
Call placed to Dr. Gray Sheets office to schedule pt for radiation appt; spoke w/ Naina Dawn. Pt's appt scheduled for 3/5 at [address] [place] [address]; Naina Dawn will call pt to confirm appt date and time.

## 2019-02-28 ENCOUNTER — TELEPHONE (OUTPATIENT)
Dept: ONCOLOGY | Age: 59
End: 2019-02-28

## 2019-02-28 NOTE — TELEPHONE ENCOUNTER
Patient called requesting a return call. Patient stated she just had her 4th round of chemo and is not recovering well. Patient would like to talk to someone about this. Please return call to discuss [phone].   sundeep

## 2019-03-01 NOTE — TELEPHONE ENCOUNTER
Spoke to Pt, she reports feeling sick this whole past week, but improved today. She reports nausea, stomach upset, gas, diarrhea, she reports she did not use any Zofran or Imodium as \"it wasn't that bad\". This was Pt's last cycle and she is glad about this. Pt advised plenty of liquids, good handwashing and plenty of rest.  Pt advised to call the office if any further problems.

## 2019-03-07 RX ORDER — OMEPRAZOLE 20 MG/1
CAPSULE, DELAYED RELEASE ORAL
Qty: 30 CAP | Refills: 0 | Status: SHIPPED | OUTPATIENT
Start: 2019-03-07 | End: 2019-04-17 | Stop reason: SDUPTHER

## 2019-03-11 DIAGNOSIS — I10 ESSENTIAL HYPERTENSION: Chronic | ICD-10-CM

## 2019-03-11 DIAGNOSIS — E03.9 HYPOTHYROIDISM, UNSPECIFIED TYPE: Chronic | ICD-10-CM

## 2019-03-11 DIAGNOSIS — E78.2 MIXED HYPERLIPIDEMIA: ICD-10-CM

## 2019-03-11 RX ORDER — LISINOPRIL AND HYDROCHLOROTHIAZIDE 12.5; 2 MG/1; MG/1
TABLET ORAL
Qty: 30 TAB | Refills: 0 | Status: SHIPPED | OUTPATIENT
Start: 2019-03-11 | End: 2019-03-13 | Stop reason: SDUPTHER

## 2019-03-11 RX ORDER — AMLODIPINE BESYLATE 10 MG/1
TABLET ORAL
Qty: 30 TAB | Refills: 0 | Status: SHIPPED | OUTPATIENT
Start: 2019-03-11 | End: 2019-04-08 | Stop reason: SDUPTHER

## 2019-03-11 RX ORDER — LEVOTHYROXINE SODIUM 25 UG/1
TABLET ORAL
Qty: 30 TAB | Refills: 0 | Status: SHIPPED | OUTPATIENT
Start: 2019-03-11 | End: 2019-04-08 | Stop reason: SDUPTHER

## 2019-03-11 RX ORDER — ATORVASTATIN CALCIUM 10 MG/1
TABLET, FILM COATED ORAL
Qty: 30 TAB | Refills: 0 | Status: SHIPPED | OUTPATIENT
Start: 2019-03-11 | End: 2019-04-08 | Stop reason: SDUPTHER

## 2019-03-13 DIAGNOSIS — I10 ESSENTIAL HYPERTENSION: Chronic | ICD-10-CM

## 2019-03-13 NOTE — TELEPHONE ENCOUNTER
----- Message from Celia Castillo sent at 3/13/2019 12:30 PM EDT -----  Regarding: Dr. Casey Jefferson Telephone  Patient stated that she needs a 60 day supply on her [address] called to [facility], [phone].  Contact is [phone]

## 2019-03-14 RX ORDER — LISINOPRIL AND HYDROCHLOROTHIAZIDE 12.5; 2 MG/1; MG/1
TABLET ORAL
Qty: 60 TAB | Refills: 0 | Status: SHIPPED | OUTPATIENT
Start: 2019-03-14 | End: 2019-04-17 | Stop reason: SDUPTHER

## 2019-03-19 ENCOUNTER — DOCUMENTATION ONLY (OUTPATIENT)
Dept: SURGERY | Age: 59
End: 2019-03-19

## 2019-03-19 DIAGNOSIS — C50.911 MALIGNANT NEOPLASM OF RIGHT FEMALE BREAST, UNSPECIFIED ESTROGEN RECEPTOR STATUS, UNSPECIFIED SITE OF BREAST (HCC): Primary | ICD-10-CM

## 2019-03-19 NOTE — PROGRESS NOTES
The patient requested a nurse call her back regarding a prescription? I called her number and had to leave a message on her voicemail.

## 2019-03-19 NOTE — PROGRESS NOTES
SCHEDULED SURGERY AT [id number] [place] [address] ON 4-4-19 AT 12:45 PM; PATIENT WILL ARRIVE AT 10:45 AM AND CHECK IN AT [facility]    [facility]    PATIENT HAS BEEN CALLED AND GIVEN INSTRUCTIONS

## 2019-03-20 ENCOUNTER — TELEPHONE (OUTPATIENT)
Dept: SURGERY | Age: 59
End: 2019-03-20

## 2019-03-20 NOTE — TELEPHONE ENCOUNTER
Called patient to discuss this. She just does not want a tube down her throat for port removal, that is why she opted for local anesthesia for port removal.   I spoke with Dr. Demetri Mccray and she can be under light sedation (iv mac), which does not require breathing tube. Patient is ok with light sedation, just does not want full sedation. Dr. Demetri Mccray aware.

## 2019-03-20 NOTE — TELEPHONE ENCOUNTER
Patient gets panic attacks and is very anxious. Is having PAC removal on 4/4/19. Would like a medication (Xanax) to take about an hour prior to the procedure to calm her. I let her know I would check with Dr. Luis Chandler and if she approves, I will call this in to [address] [place] [address] on [employer]. She was appreciative.

## 2019-03-27 NOTE — PERIOP NOTES
[facility] Ambulatory Surgery Unit Pre-operative Instructions Procedure Date  4/4/19           Tentative Arrival Time 1130 1. On the day of your procedure, please report to the Ambulatory Surgery Unit Registration Desk and sign in at your designated time. The Ambulatory Surgery Unit is located in [facility] on the [facility] side of the \[facility]\"" across from the [address] [place] [address]. Please have all of your health insurance cards and a photo ID. 2. You must have someone with you to drive you home as directed by your surgeon. 3. You may have a light breakfast and take normal morning medications. 4. We recommend you do not drink any alcoholic beverages for 24 hours before and after your procedure. 5. Contact your surgeons office for instructions on the following medications: non-steroidal anti-inflammatory drugs (i.e. Advil, Aleve), vitamins, and supplements. (Some surgeons will want you to stop these medications prior to surgery and others may allow you to take them) **If you are currently taking Plavix, Coumadin, Aspirin and/or other blood-thinning agents, contact your surgeon for instructions. ** Your surgeon will partner with the physician prescribing these medications to determine if it is safe to stop or if you need to continue taking. Please do not stop taking these medications without instructions from your surgeon. 6. In an effort to help prevent surgical site infection, we ask that you shower with an anti-bacterial soap (i.e. Dial or Safeguard) on the morning of your procedure. Do not apply any lotions, powders, or deodorants after showering. 7. Wear comfortable clothes. Wear glasses instead of contacts. Do not bring any jewelry or money (other than copays or fees as instructed). Do not wear make-up, particularly mascara, the morning of your procedure. Wear your hair loose or down, no ponytails, buns, shay pins or clips.  All body piercings must be removed. 8. You should understand that if you do not follow these instructions your procedure may be cancelled. If your physical condition changes (i.e. fever, cold or flu) please contact your surgeon as soon as possible. 9. It is important that you be on time. If a situation occurs where you may be late, or if you have any questions or problems, please call [phone]. 
 
10. Your procedure time may be subject to change. You will receive a phone call the day prior to confirm your arrival time. I understand a pre-operative phone call will be made to verify my procedure time. In the event that I am not available, I give permission for a message to be left on my answering service and/or with another person? YES The above note was reviewed with patient during PAT phone call on 3/27/19, patient verbalized understanding.  
 
 
 
 
 ___________________      ___________________      ___________________ 
(Signature of Patient)          (Witness)                   (Date and Time)

## 2019-03-27 NOTE — PERIOP NOTES
[facility] Ambulatory Surgery Unit Pre-operative Instructions Surgery/Procedure Date  4/4/19           Tentative Arrival Time 1. On the day of your surgery/procedure, please report to the Ambulatory Surgery Unit Registration Desk and sign in at your designated time. The Ambulatory Surgery Unit is located in [facility] on the [facility] side of the [facility] across from the [facility]. Please have all of your health insurance cards and a photo ID. 2. You must have someone with you to drive you home, as you should not drive a car for 24 hours following anesthesia. Please make arrangements for a responsible adult friend or family member to stay with you for at least the first 24 hours after your surgery. 3. Do not have anything to eat or drink (including water, gum, mints, coffee, juice) after 11:59 PM  4/3/19. This may not apply to medications prescribed by your physician. (Please note below the special instructions with medications to take the morning of surgery, if applicable.) 4. We recommend you do not drink any alcoholic beverages for 24 hours before and after your surgery. 5. Contact your surgeons office for instructions on the following medications: non-steroidal anti-inflammatory drugs (i.e. Advil, Aleve), vitamins, and supplements. (Some surgeons will want you to stop these medications prior to surgery and others may allow you to take them) **If you are currently taking Plavix, Coumadin, Aspirin and/or other blood-thinning agents, contact your surgeon for instructions. ** Your surgeon will partner with the physician prescribing these medications to determine if it is safe to stop or if you need to continue taking. Please do not stop taking these medications without instructions from your surgeon.  
 
6. In an effort to help prevent surgical site infection, we ask that you shower with an anti-bacterial soap (i.e. Dial/Safeguard, or the soap provided to you at your preadmission testing appointment)  on the morning of surgery, using a fresh towel after each shower. (Please begin this process with fresh bed linens.) Do not apply any lotions, powders, or deodorants after the shower on the day of your procedure. If applicable, please do not shave the operative site for 48 hours prior to surgery. 7. Wear comfortable clothes. Wear glasses instead of contacts. Do not bring any jewelry or money (other than copays or fees as instructed). Do not wear make-up, particularly mascara, the morning of your surgery. Do not wear nail polish, particularly if you are having foot /hand surgery. Wear your hair loose or down, no ponytails, buns, shay pins or clips. All body piercings must be removed. 8. You should understand that if you do not follow these instructions your surgery may be cancelled. If your physical condition changes (i.e. fever, cold or flu) please contact your surgeon as soon as possible. 9. It is important that you be on time. If a situation occurs where you may be late, or if you have any questions or problems, please call [phone]. 
 
10. Your surgery time may be subject to change. You will receive a phone call the day prior to surgery to confirm your arrival time. 11. Pediatric patients: please bring a change of clothes, diapers, bottle/sippy cup, pacifier, etc. 
 
 
Special Instructions: Take all medications and inhalers, as prescribed, on the morning of surgery with a sip of water EXCEPT: none Insulin Dependent Diabetic patients: Take your diabetic medications as prescribed the day before surgery. Hold all diabetic medications the day of surgery. If you are scheduled to arrive for surgery after 8:00 AM, and your AM blood sugar is >200, please call Ambulatory Surgery. I understand a pre-operative phone call will be made to verify my surgery time.   In the event that I am not available, I give permission for a message to be left on my answering service and/or with another person? YES The above note was reviewed with patient during PAT phone call on 3/27/19, patient verbalized understanding.  
 
 
 
 
 ___________________      ___________________      ________________ 
(Signature of Patient)          (Witness)                   (Date and Time)

## 2019-03-27 NOTE — PERIOP NOTES
NOE at Dr. Earnest Nolen office for Deirdre Padilla to confirm that procedure is scheduled as local and confirm if Dr. Zulema Valdovinos told pt she could drive herself to procedure. RN req return call to PAT.   
 
1329:  RN verified with Deirdre Padilla at Dr. Earnest Nolen office that patient is scheduled correctly as a \"local\" procedure. RN contacted pt to review changes pre-op instructions for local anesthesia and advised pt that she can eat light breakfast and take normal morning medications, all other instructions reviewed again as well. RN still waiting for Deirdre Padilla at Dr. Earnest Nolen office to confirm that patient can drive herself to procedure.

## 2019-04-01 NOTE — TELEPHONE ENCOUNTER
I called and left a message with the patient that she will need a  to take her home after the portcath is removed. I will call and change this with posting.

## 2019-04-01 NOTE — TELEPHONE ENCOUNTER
I spoke with Jessie Wylie in [facility] [facility] and let her know this patient will be having Mac for her anesthesia instead of local.

## 2019-04-02 NOTE — PERIOP NOTES
[facility] Ambulatory Surgery Unit Pre-operative Instructions Surgery/Procedure Date  4/4/19           Tentative Arrival Time 1770 1. On the day of your surgery/procedure, please report to the Ambulatory Surgery Unit Registration Desk and sign in at your designated time. The Ambulatory Surgery Unit is located in [facility] on the [facility] side of the [facility] across from the [address] [place] [address]. Please have all of your health insurance cards and a photo ID. 2. You must have someone with you to drive you home, as you should not drive a car for 24 hours following anesthesia. Please make arrangements for a responsible adult friend or family member to stay with you for at least the first 24 hours after your surgery. 3. Do not have anything to eat or drink (including water, gum, mints, coffee, juice) after 11:59 PM  4/3/19. This may not apply to medications prescribed by your physician. (Please note below the special instructions with medications to take the morning of surgery, if applicable.) 4. We recommend you do not drink any alcoholic beverages for 24 hours before and after your surgery. 5. Contact your surgeons office for instructions on the following medications: non-steroidal anti-inflammatory drugs (i.e. Advil, Aleve), vitamins, and supplements. (Some surgeons will want you to stop these medications prior to surgery and others may allow you to take them) **If you are currently taking Plavix, Coumadin, Aspirin and/or other blood-thinning agents, contact your surgeon for instructions. ** Your surgeon will partner with the physician prescribing these medications to determine if it is safe to stop or if you need to continue taking. Please do not stop taking these medications without instructions from your surgeon.  
 
6. In an effort to help prevent surgical site infection, we ask that you shower with an anti-bacterial soap (i.e. Dial/Safeguard, or the soap provided to you at your preadmission testing appointment) for 3 days prior to and on the morning of surgery, using a fresh towel after each shower. (Please begin this process with fresh bed linens.) Do not apply any lotions, powders, or deodorants after the shower on the day of your procedure. If applicable, please do not shave the operative site for 48 hours prior to surgery. 7. Wear comfortable clothes. Wear glasses instead of contacts. Do not bring any jewelry or money (other than copays or fees as instructed). Do not wear make-up, particularly mascara, the morning of your surgery. Do not wear nail polish, particularly if you are having foot /hand surgery. Wear your hair loose or down, no ponytails, buns, shay pins or clips. All body piercings must be removed. 8. You should understand that if you do not follow these instructions your surgery may be cancelled. If your physical condition changes (i.e. fever, cold or flu) please contact your surgeon as soon as possible. 9. It is important that you be on time. If a situation occurs where you may be late, or if you have any questions or problems, please call [phone]. 
 
10. Your surgery time may be subject to change. You will receive a phone call the day prior to surgery to confirm your arrival time. 11. Pediatric patients: please bring a change of clothes, diapers, bottle/sippy cup, pacifier, etc. 
 
 
Special Instructions: Take all medications and inhalers, as prescribed, on the morning of surgery with a sip of water EXCEPT: none Insulin Dependent Diabetic patients: Take your diabetic medications as prescribed the day before surgery. Hold all diabetic medications the day of surgery. If you are scheduled to arrive for surgery after 8:00 AM, and your AM blood sugar is >200, please call Ambulatory Surgery.  
 
 
I understand a pre-operative phone call will be made to verify my surgery time.  In the event that I am not available, I give permission for a message to be left on my answering service and/or with another person? YES The above note was reviewed with patient during PAT phone call on 4/2/19, patient verbalized understanding.  
 
 
 
 ___________________      ___________________      ________________ 
(Signature of Patient)          (Witness)                   (Date and Time)

## 2019-04-02 NOTE — PERIOP NOTES
Patients case has been changed to MAC from LOCAL. RN contacted pt to review MAC pre op instructions however pt reports she does not have anyone that can accompany her to the procedure and noone that can stay with her for 24 hrs post procedure and that she would have to be dropped off and picked up.   
 
1348:  RN spoke to Aura Ortiz at Dr. Rony Myers office and informed of above. Per Aura Ortiz she will review with Dr. Caroline Castillo and [facility] to [facility] to inform if pt will be MAC or Local.   
 
1416:  RN spoke to Dr. Caroilne Castillo and informed of above and per Dr. Caroline Csatillo she will be contacting patient and RC to [facility]. 1425:  TC from Dr. Caroline Castillo, per Dr. Caroline Castillo pt will be done as MAC and pt has advised her that she will have someone with her to drive her home and stay with her for 24 hrs after procedure. Dr. Caroline Castillo is aware that pt will not have anyone in waiting room during procedure and is ok with this. 1457:  TC to patient to review pre-op instructions for MAC anesthesia case. Pt verbalized understanding and reports she now has someone that will accompany her to procedure, drive her home and remain with her for 24 hrs.

## 2019-04-03 ENCOUNTER — ANESTHESIA EVENT (OUTPATIENT)
Dept: SURGERY | Age: 59
End: 2019-04-03
Payer: COMMERCIAL

## 2019-04-04 ENCOUNTER — HOSPITAL ENCOUNTER (OUTPATIENT)
Age: 59
Setting detail: OUTPATIENT SURGERY
Discharge: HOME | End: 2019-04-04
Attending: SURGERY | Admitting: SURGERY
Payer: COMMERCIAL

## 2019-04-04 ENCOUNTER — ANESTHESIA (OUTPATIENT)
Dept: SURGERY | Age: 59
End: 2019-04-04
Payer: COMMERCIAL

## 2019-04-04 VITALS
HEIGHT: 62 IN | DIASTOLIC BLOOD PRESSURE: 66 MMHG | BODY MASS INDEX: 45.45 KG/M2 | TEMPERATURE: 98.3 F | OXYGEN SATURATION: 98 % | WEIGHT: 247 LBS | HEART RATE: 84 BPM | SYSTOLIC BLOOD PRESSURE: 103 MMHG | RESPIRATION RATE: 17 BRPM

## 2019-04-04 DIAGNOSIS — C50.911 MALIGNANT NEOPLASM OF RIGHT FEMALE BREAST, UNSPECIFIED ESTROGEN RECEPTOR STATUS, UNSPECIFIED SITE OF BREAST (HCC): ICD-10-CM

## 2019-04-04 PROCEDURE — 76210000046 HC AMBSU PH II REC FIRST 0.5 HR: Performed by: SURGERY

## 2019-04-04 PROCEDURE — 77030021352 HC CBL LD SYS DISP COVD -B: Performed by: SURGERY

## 2019-04-04 PROCEDURE — 76060000061 HC AMB SURG ANES 0.5 TO 1 HR: Performed by: SURGERY

## 2019-04-04 PROCEDURE — 77030002933 HC SUT MCRYL J&J -A: Performed by: SURGERY

## 2019-04-04 PROCEDURE — 74011250636 HC RX REV CODE- 250/636: Performed by: ANESTHESIOLOGY

## 2019-04-04 PROCEDURE — 77030011267 HC ELECTRD BLD COVD -A: Performed by: SURGERY

## 2019-04-04 PROCEDURE — 77030031139 HC SUT VCRL2 J&J -A: Performed by: SURGERY

## 2019-04-04 PROCEDURE — 77030020255 HC SOL INJ LR 1000ML BG: Performed by: SURGERY

## 2019-04-04 PROCEDURE — 74011000250 HC RX REV CODE- 250: Performed by: SURGERY

## 2019-04-04 PROCEDURE — 74011250636 HC RX REV CODE- 250/636

## 2019-04-04 PROCEDURE — 76030000000 HC AMB SURG OR TIME 0.5 TO 1: Performed by: SURGERY

## 2019-04-04 PROCEDURE — 77030039266 HC ADH SKN EXOFIN S2SG -A: Performed by: SURGERY

## 2019-04-04 PROCEDURE — 77030018836 HC SOL IRR NACL ICUM -A: Performed by: SURGERY

## 2019-04-04 PROCEDURE — 77030011640 HC PAD GRND REM COVD -A: Performed by: SURGERY

## 2019-04-04 PROCEDURE — 76210000040 HC AMBSU PH I REC FIRST 0.5 HR: Performed by: SURGERY

## 2019-04-04 RX ORDER — SODIUM CHLORIDE, SODIUM LACTATE, POTASSIUM CHLORIDE, CALCIUM CHLORIDE 600; 310; 30; 20 MG/100ML; MG/100ML; MG/100ML; MG/100ML
25 INJECTION, SOLUTION INTRAVENOUS CONTINUOUS
Status: DISCONTINUED | OUTPATIENT
Start: 2019-04-04 | End: 2019-04-04 | Stop reason: HOSPADM

## 2019-04-04 RX ORDER — MIDAZOLAM HYDROCHLORIDE 1 MG/ML
INJECTION, SOLUTION INTRAMUSCULAR; INTRAVENOUS AS NEEDED
Status: DISCONTINUED | OUTPATIENT
Start: 2019-04-04 | End: 2019-04-04 | Stop reason: HOSPADM

## 2019-04-04 RX ORDER — SODIUM CHLORIDE 0.9 % (FLUSH) 0.9 %
5-40 SYRINGE (ML) INJECTION AS NEEDED
Status: DISCONTINUED | OUTPATIENT
Start: 2019-04-04 | End: 2019-04-04 | Stop reason: HOSPADM

## 2019-04-04 RX ORDER — ONDANSETRON 2 MG/ML
INJECTION INTRAMUSCULAR; INTRAVENOUS AS NEEDED
Status: DISCONTINUED | OUTPATIENT
Start: 2019-04-04 | End: 2019-04-04 | Stop reason: HOSPADM

## 2019-04-04 RX ORDER — ONDANSETRON 2 MG/ML
4 INJECTION INTRAMUSCULAR; INTRAVENOUS AS NEEDED
Status: DISCONTINUED | OUTPATIENT
Start: 2019-04-04 | End: 2019-04-04 | Stop reason: HOSPADM

## 2019-04-04 RX ORDER — BUPIVACAINE HYDROCHLORIDE 2.5 MG/ML
INJECTION, SOLUTION EPIDURAL; INFILTRATION; INTRACAUDAL AS NEEDED
Status: DISCONTINUED | OUTPATIENT
Start: 2019-04-04 | End: 2019-04-04 | Stop reason: HOSPADM

## 2019-04-04 RX ORDER — OXYCODONE AND ACETAMINOPHEN 5; 325 MG/1; MG/1
1 TABLET ORAL
Status: DISCONTINUED | OUTPATIENT
Start: 2019-04-04 | End: 2019-04-04 | Stop reason: HOSPADM

## 2019-04-04 RX ORDER — SODIUM CHLORIDE 0.9 % (FLUSH) 0.9 %
5-40 SYRINGE (ML) INJECTION EVERY 8 HOURS
Status: DISCONTINUED | OUTPATIENT
Start: 2019-04-04 | End: 2019-04-04 | Stop reason: HOSPADM

## 2019-04-04 RX ORDER — LIDOCAINE HYDROCHLORIDE AND EPINEPHRINE 10; 10 MG/ML; UG/ML
INJECTION, SOLUTION INFILTRATION; PERINEURAL AS NEEDED
Status: DISCONTINUED | OUTPATIENT
Start: 2019-04-04 | End: 2019-04-04 | Stop reason: HOSPADM

## 2019-04-04 RX ORDER — FENTANYL CITRATE 50 UG/ML
25 INJECTION, SOLUTION INTRAMUSCULAR; INTRAVENOUS
Status: DISCONTINUED | OUTPATIENT
Start: 2019-04-04 | End: 2019-04-04 | Stop reason: HOSPADM

## 2019-04-04 RX ORDER — PROPOFOL 10 MG/ML
INJECTION, EMULSION INTRAVENOUS
Status: DISCONTINUED | OUTPATIENT
Start: 2019-04-04 | End: 2019-04-04 | Stop reason: HOSPADM

## 2019-04-04 RX ORDER — FENTANYL CITRATE 50 UG/ML
INJECTION, SOLUTION INTRAMUSCULAR; INTRAVENOUS AS NEEDED
Status: DISCONTINUED | OUTPATIENT
Start: 2019-04-04 | End: 2019-04-04 | Stop reason: HOSPADM

## 2019-04-04 RX ORDER — LIDOCAINE HYDROCHLORIDE 10 MG/ML
0.1 INJECTION, SOLUTION EPIDURAL; INFILTRATION; INTRACAUDAL; PERINEURAL AS NEEDED
Status: DISCONTINUED | OUTPATIENT
Start: 2019-04-04 | End: 2019-04-04 | Stop reason: HOSPADM

## 2019-04-04 RX ORDER — PROPOFOL 10 MG/ML
INJECTION, EMULSION INTRAVENOUS AS NEEDED
Status: DISCONTINUED | OUTPATIENT
Start: 2019-04-04 | End: 2019-04-04 | Stop reason: HOSPADM

## 2019-04-04 RX ORDER — LIDOCAINE HYDROCHLORIDE 20 MG/ML
INJECTION, SOLUTION EPIDURAL; INFILTRATION; INTRACAUDAL; PERINEURAL AS NEEDED
Status: DISCONTINUED | OUTPATIENT
Start: 2019-04-04 | End: 2019-04-04 | Stop reason: HOSPADM

## 2019-04-04 RX ORDER — DIPHENHYDRAMINE HYDROCHLORIDE 50 MG/ML
12.5 INJECTION, SOLUTION INTRAMUSCULAR; INTRAVENOUS AS NEEDED
Status: DISCONTINUED | OUTPATIENT
Start: 2019-04-04 | End: 2019-04-04 | Stop reason: HOSPADM

## 2019-04-04 RX ADMIN — SODIUM CHLORIDE, SODIUM LACTATE, POTASSIUM CHLORIDE, AND CALCIUM CHLORIDE 25 ML/HR: 600; 310; 30; 20 INJECTION, SOLUTION INTRAVENOUS at 09:52

## 2019-04-04 RX ADMIN — LIDOCAINE HYDROCHLORIDE 60 MG: 20 INJECTION, SOLUTION EPIDURAL; INFILTRATION; INTRACAUDAL; PERINEURAL at 10:13

## 2019-04-04 RX ADMIN — FENTANYL CITRATE 50 MCG: 50 INJECTION, SOLUTION INTRAMUSCULAR; INTRAVENOUS at 10:14

## 2019-04-04 RX ADMIN — ONDANSETRON 4 MG: 2 INJECTION INTRAMUSCULAR; INTRAVENOUS at 10:13

## 2019-04-04 RX ADMIN — PROPOFOL 30 MG: 10 INJECTION, EMULSION INTRAVENOUS at 10:14

## 2019-04-04 RX ADMIN — MIDAZOLAM HYDROCHLORIDE 1 MG: 1 INJECTION, SOLUTION INTRAMUSCULAR; INTRAVENOUS at 10:11

## 2019-04-04 RX ADMIN — MIDAZOLAM HYDROCHLORIDE 1 MG: 1 INJECTION, SOLUTION INTRAMUSCULAR; INTRAVENOUS at 10:14

## 2019-04-04 RX ADMIN — PROPOFOL 20 MG: 10 INJECTION, EMULSION INTRAVENOUS at 10:12

## 2019-04-04 RX ADMIN — PROPOFOL 75 MCG/KG/MIN: 10 INJECTION, EMULSION INTRAVENOUS at 10:13

## 2019-04-04 NOTE — OP NOTES
[address]  OPERATIVE REPORT    Name:  Mihaela Rutledge  MR#:  [id number]  :  1960  ACCOUNT #:  [de-identified]  DATE OF SERVICE:  2019      PREOPERATIVE DIAGNOSIS:  Right breast cancer, recurrent. POSTOPERATIVE DIAGNOSIS:  Right breast cancer, recurrent. PROCEDURE PERFORMED:  Cath removal, left subclavian. SURGEON:  Lonnie Bullock MD    ASSISTANT:  Juanis Rahman    ANESTHESIA:  MAC.    COMPLICATIONS:  None. SPECIMENS REMOVED:  None. IMPLANTS:  No implants. ESTIMATED BLOOD LOSS:  Minimal.    FINDINGS:  Port removed intact. INDICATION FOR PROCEDURE:  This is a 47-year-old female, who had a history of right breast cancer and then had recurrent right breast cancer removed with adjuvant chemotherapy. She was done with this and therefore port was to be removed. PROCEDURE IN DETAIL:  The patient was seen in the preoperative holding area where surgical site was marked by surgeon and informed consent was obtained. She was taken to the operating room and laid in the supine position where MAC anesthesia was induced. The chest wall was prepped and draped in the usual fashion and timeout was performed. A 20 mL of local anesthetic was injected at the port site and a 15-blade was used to make an incision at the old scar. Bovie cautery was used to dissect through the capsule around the port. Heavy scissors was used to remove the suture on either side and pressure was held on the port insertion site for 5 minutes. The port tract was oversewn with interrupted 3-0 Vicryl. Skin was closed with interrupted 3-0 Vicryl and 4-0 subcuticular Monocryl. Skin glue was placed on the skin. All sponge, needle, and instrument counts were correct. The patient went to recovery in stable condition.       MD JACKIE [email or web address]/[email or web address]  D:  2019 10:39  T:  2019 13:12  JOB #:  [id number]

## 2019-04-04 NOTE — ANESTHESIA POSTPROCEDURE EVALUATION
Procedure(s): PORTACATH  REMOVAL. total IV anesthesia, general 
 
Anesthesia Post Evaluation Multimodal analgesia: multimodal analgesia used between 6 hours prior to anesthesia start to PACU discharge Patient location during evaluation: bedside Patient participation: complete - patient participated Level of consciousness: awake and alert Pain score: 0 Airway patency: patent Anesthetic complications: no 
Cardiovascular status: acceptable Respiratory status: acceptable Hydration status: acceptable Post anesthesia nausea and vomiting:  none Vitals Value Taken Time /62 4/4/2019 10:47 AM  
Temp 36.8 °C (98.3 °F) 4/4/2019 10:44 AM  
Pulse 85 4/4/2019 10:47 AM  
Resp 18 4/4/2019 10:47 AM  
SpO2 96 % 4/4/2019 10:47 AM

## 2019-04-04 NOTE — ANESTHESIA PREPROCEDURE EVALUATION
Anesthetic History No history of anesthetic complications Review of Systems / Medical History Patient summary reviewed, nursing notes reviewed and pertinent labs reviewed Pulmonary Smoker (former, 60 pk yrs) Neuro/Psych Within defined limits Cardiovascular Hypertension Exercise tolerance: >4 METS 
  
GI/Hepatic/Renal 
  
GERD: well controlled Endo/Other Hypothyroidism: well controlled Morbid obesity and cancer (breast cancer with recurrence) Other Findings Comments: BPPV (benign paroxysmal positional vertigo Physical Exam 
 
Airway Mallampati: II 
TM Distance: 4 - 6 cm Neck ROM: normal range of motion, short neck Mouth opening: Normal 
 
 Cardiovascular Rhythm: regular Rate: normal 
 
 
 
 Dental 
No notable dental hx Pulmonary Breath sounds clear to auscultation Abdominal 
GI exam deferred Other Findings Anesthetic Plan ASA: 3 Anesthesia type: MAC Induction: Intravenous Anesthetic plan and risks discussed with: Patient

## 2019-04-04 NOTE — BRIEF OP NOTE
BRIEF OPERATIVE NOTE Date of Procedure: 4/4/2019 Preoperative Diagnosis: RIGHT BREAST CANCER, recurrent Postoperative Diagnosis: RIGHT BREAST CANCER, recurrent Procedure(s): PORTACATH  REMOVAL left subclaian Surgeon(s) and Role: Hema Hernandez MD - Primary Surgical Assistant:kayleen willson Surgical Staff: 
Circ-1: Triston Mcfarland RN Scrub Tech-1: Sowmya Brewer Surg Asst-1: Brijesh Plata Event Time In Time Out Incision Start [phone] Incision Close Anesthesia: MAC Estimated Blood Loss: minimal 
Specimens: * No specimens in log * Findings: port removed Complications: none Implants: * No implants in log * Dictated [id number]

## 2019-04-04 NOTE — PERIOP NOTES
1042: pt states she does not wish for staff to share information with her friend/Mark. Pt denies pain at this time, tolerating PO ice chips per her request. 
DC instructions provided prior to procedure and signed by pt. Additional information given about Dermabond. 1110: IV access removed. 1115: pt transported to car via wheelchair by nurse to be dc to home with friend.

## 2019-04-04 NOTE — DISCHARGE INSTRUCTIONS
Discharge Instructions from Dr. Sara Ellington    ·   · You may shower, but no hot tubs, swimming pools, or baths until your incision is healed. · No heavy lifting with the affected extremity (nothing greater than 5 pounds), and limit its use for the next 4-5 days. · You may use an ice pack for comfort for the next couple of days, but do not place ice directly on the skin. Rather, use a towel or clothing to serve as a barrier between skin and ice to prevent injury. · If I placed a drain, follow the drain instructions provided, especially as you keep a record of the drain output. · Follow medication instructions carefully. ·   · You will have bruising and swelling  · Watch for signs of infection as listed below. · Redness  · Swelling  · Drainage from the incision or from your nipple that appears infected  · Fever over 101.5 degrees for consecutive readings, or over 99.5 if you are currently undergoing chemotherapy. · Call our office (number is below) for a follow-up appointment. · If you have any problems, our phone number is [phone]    DO NOT TAKE SLEEPING MEDICATIONS OR ANTIANXIETY MEDICATIONS WHILE TAKING NARCOTIC PAIN MEDICATIONS,  ESPECIALLY THE NIGHT OF ANESTHESIA! DISCHARGE SUMMARY from Nurse    The following personal items collected during your admission are returned to you:   Dental Appliance: Dental Appliances: None  Vision: Visual Aid: None  Hearing Aid:    Jewelry: Jewelry: None  Clothing: Clothing: Other (comment), Footwear, With patient, Shirt, Pants  Other Valuables: Other Valuables: Other (comment)(license with pt in her belonging bag)  Valuables sent to safe:        PATIENT INSTRUCTIONS:    After General Anesthesia or Intravenous Sedation, for 24 hours or while taking prescription Narcotics:        Someone should be with you for the next 24 hours. For your own safety, a responsible adult must drive you home.   · Limit your activities  · Recommended activity: Rest today, up with assistance today. Do not climb stairs or shower unattended for the next 24 hours. · Please start with a soft bland diet and advance as tolerated (no nausea) to regular diet. · If you have a sore throat you should try the following: fluids, warm salt water gargles, or throat lozenges. If it does not improve after several days please follow up with your primary physician. · Do not drive and operate hazardous machinery  · Do not make important personal or business decisions  · Do  not drink alcoholic beverages  · If you have not urinated within 8 hours after discharge, please contact your surgeon on call. Report the following to your surgeon:  · Excessive pain, swelling, redness or odor of or around the surgical area  · Temperature over 100.5  · Nausea and vomiting lasting longer than 4 hours or if unable to take medications  · Any signs of decreased circulation or nerve impairment to extremity: change in color, persistent  numbness, tingling, coldness or increase pain      · You will receive a Post Operative Call from one of the Recovery Room Nurses on the day after your surgery to check on you. It is very important for us to know how you are recovering after your surgery. If you have an issue or need to speak with someone, please call your surgeon, do not wait for the post operative call. · You may receive an e-mail or letter in the mail from [employer] regarding your experience with us in the Ambulatory Surgery Unit. Your feedback is valuable to us and we appreciate your participation in the survey. · If the above instructions are not adequate or you are having problems after your surgery, call the physician at their office number. · We wish you a speedy recovery ? What to do at Home:      *  Please give a list of your current medications to your Primary Care Provider.     *  Please update this list whenever your medications are discontinued, doses are      changed, or new medications (including over-the-counter products) are added. *  Please carry medication information at all times in case of emergency situations. These are general instructions for a healthy lifestyle:    No smoking/ No tobacco products/ Avoid exposure to second hand smoke    Surgeon General's Warning:  Quitting smoking now greatly reduces serious risk to your health. Obesity, smoking, and sedentary lifestyle greatly increases your risk for illness    A healthy diet, regular physical exercise & weight monitoring are important for maintaining a healthy lifestyle    You may be retaining fluid if you have a history of heart failure or if you experience any of the following symptoms:  Weight gain of 3 pounds or more overnight or 5 pounds in a week, increased swelling in our hands or feet or shortness of breath while lying flat in bed. Please call your doctor as soon as you notice any of these symptoms; do not wait until your next office visit. Recognize signs and symptoms of STROKE:    B - Balance  E - Eyes    F-  Face looks uneven  A-  Arms unable to move or move even  S-  Speech slurred or non-existent  T-  Time-call 911 as soon as signs and symptoms begin-DO NOT go       Back to bed or wait to see if you get better-TIME IS BRAIN. If you have not received your influenza and/or pneumococcal vaccine, please follow up with your primary care physician. The discharge information has been reviewed with the patient and caregiver. The patient and caregiver verbalized understanding. TO PREVENT AN INFECTION      1. [address] YOUR HANDS     To prevent infection, good handwashing is the most important thing you or your caregiver can do.  Wash your hands with soap and water or use the hand  we gave you before you touch any wounds. 2. SHOWER     Use the antibacterial soap we gave you when you take a shower.  Shower with this soap until your wounds are healed.        To reach all areas of your body, you may need someone to help you.  Dont forget to clean your belly button with every shower. 3.  USE CLEAN SHEETS     Use freshly cleaned sheets on your bed after surgery.  To keep the surgery site clean, do not allow pets to sleep with you while your wound is still healing. 4. STOP SMOKING     Stop smoking, or at least cut back on smoking     Smoking slows your healing. 5.  CONTROL YOUR BLOOD SUGAR     High blood sugars slow wound healing.  If you are diabetic, control your blood sugar levels before and after your surgery. Dermabond Instructions    How to Care for Your Wound after Its Treated with DERMABOND* topical skin Adhesive  DERMABOND* Topical skin adhesive (2-octyl cyanoacrylate) is a sterile, liquid skin adhesive that holds wound edges together. The film will usually remain in place for 5 to 10 days, then naturally fall off your skin. The following will answer some of your questions and provide instructions for proper care for your wound while it is healing:  CHECK WOUND APPEARANCE   Some swelling, redness, and pain are common with all wounds and normally will go away as the wound heals. If swelling, redness, or pain increases or if the wound feels warm to the tough, contact a doctor. Also contact a doctor if the wound edges reopen or separate. REPLACE BANDAGES   If your wound is bandaged, keep the bandage dry.  Replace the dressing daily until the adhesive film has fallen off or if the bandage should become wet, unless otherwise instructed by your physician.  When changing the dressing, do not place tape directly over the DERMABOND* adhesive film, because removing the tape later may also remove the film. AVOID TOPICAL MEDICATIONS   Do not apply liquid or ointment medications or any other product to your wound while the DERMABOND* adhesive film is in place. These may loosen the film before your wound is healed.   KEEP WOUND DRY AND PROTECTED   You may occasionally and briefly wet your wound in the shower or bath. Do not soak or scrub your wound, do not swim, and avoid periods of heavy perspiration until the DERMABOND* adhesive has naturally fallen off. After showering or bathing, gently blot your wound dry with a soft towel. If a protective dressing is being used, apply a fresh, dry bandage, being sure to keep the tape off the DERMABOND* adhesive film.  Apply a clean, dry bandage over the wound if necessary to protect it.  Protect your wound from injury until the skin has had sufficient time to heal.   Do not scratch, rub, or pick at the DERMABOND* adhesive film. This may loosen the film before your wound is healed.  Protect the wound from prolonged exposure to sunlight or tanning lamps while the film is in place. If you have any questions or concerns about this product, please consult your doctor.   *[employer]

## 2019-04-04 NOTE — PERIOP NOTES
Yan White Nichols 
1960 
[id number] Situation: 
Verbal report given from: Sánchez Angulo and Edie salmon RN Procedure: Procedure(s): PORTACATH  REMOVAL Background: 
 
Preoperative diagnosis: RIGHT BREAST CANCER Postoperative diagnosis: RIGHT BREAST CANCER :  Dr. Randy Crooks Assistant(s): Circ-1: Silvino Arango RN Scrub Tech-1: Irais Westbrook Surg Asst-1: Arvella Staisidra Specimens: * No specimens in log * Assessment: 
Intra-procedure medications Anesthesia gave intra-procedure sedation and medications, see anesthesia flow sheet Intravenous fluids: Gerrianne Creamer Vital signs stable Recommendation: 
 
Permission to share finding with Mark/friend : NO

## 2019-04-04 NOTE — H&P
Diagnosis:  
  
1. Recurrent breast carcinoma Right chest wall  
  
2. Right breast carcinoma: 
T2 N0 M0 (Stage IIA) infiltrating ductal carcinoma, Tumor size 2.2 cm and 3.5 cm, LN -ve, grade 1, ER 95%, NE 90%, Her 2 -ve Treatment:  
  
1. Adjuvant chemotherapy TC, cycle 4 day 1 
2. Excision of the right sided chest wall/breast mass 10/19/2018 
3. B/L mastectomy on 09/25/2015 
  
Subjective:  
  
Crystal Nichols is a 66 y.o. female who I am seeing in follow up for recurrent breast carcinoma.  
  
She underwent a screening mammogram in July 2015. Abnormality was noted in the right breast. 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 She elected to undergo b/l mastectomy and had it done on 09/25/2015. She suffered necrosis of skin flaps and had to undergo redo surgeries. After several rounds of salvage surgeries, she is completely healed. She took AI for a few months and then stopped on her own and also did not follow up in the clinic.  
  
She suffered a recurrence on the right chest wall over the reconstructed breast which was excised by Dr. Cristopher Newman on 10/19/2018. The margins are close. She saw Dr. Vaibhav Amato underwent re-excision of the skin around the previous surgical margin. She also underwent a port placement. She is receiving her last dose of adjuvant chemotherapy today. She is complaining of allergies and is using mucinex.  
  
Review of Systems: 
  
Constitutional: negative Eyes: negative Ears, Nose, Mouth, Throat, and Face: negative Respiratory: negative Cardiovascular: negative Gastrointestinal: negative Genitourinary:negative Integument/Breast: negative Hematologic/Lymphatic: negative Musculoskeletal:negative Neurological: negative   
  
  
    
Past Medical History:  
Diagnosis Date  Acid reflux    
 Adverse effect of anesthesia    
  sister has a lot of allergies and issues with anesthesia per pt  BPPV (benign paroxysmal positional vertigo)    
 Breast cancer ([facility] [address] 7/2014  
  right  Environmental allergies    
 Gallstones    
  Negative workup in 2009  GERD (gastroesophageal reflux disease)    
 HTN (hypertension) 2/24/2015  Hypertension 9/19/2013  Hypertension    
 Hypoactive thyroid    
 Hypothyroidism 2/24/2015  Mixed hyperlipidemia 5/12/2016  Morbid obesity ([facility] [address]    
 Prediabetes 5/12/2016  
  
     
Past Surgical History:  
Procedure Laterality Date  HX BREAST BIOPSY Right 12/7/2018  
  RIGHT BREAST SKIN EXCISIONAL BIOPSY, performed by Amanda García MD at [facility] AMBULATORY OR  
 HX BREAST RECONSTRUCTION Bilateral 9/24/2015  
  BREAST RECONSTRUCTION performed by Conchis Proctor MD at [place] [address] HX BREAST RECONSTRUCTION Right 11/10/2015  
  REMOVAL RIGHT BREAST TISSUE EXPANDER performed by Conchis Proctor MD at [facility] MAIN OR  
 HX BREAST RECONSTRUCTION Right 2/19/2016  
  PLACEMENT RIGHT TISSUE EXPANDER  performed by Conchis Proctor MD at [facility] MAIN OR  
 HX BREAST RECONSTRUCTION Bilateral 10/21/2016  
  EXCHANGE BILATERAL TISSUE EXPANDER FOR BREAST IMPLANT performed by Conchis Proctor MD at [facility] MAIN OR  
 HX GYN   1988, 1983  
  Vaginal deliveries  HX LAP CHOLECYSTECTOMY      
 HX MASTECTOMY Bilateral 9/24/2015  
  BILATERAL BREAST SKIN SPARING MASTECTOMY, RIGHT SENTINEL NODE BIOPSY/BILATERAL BREAST RECONSTRUCTION W/TISSUE EXPANDERS AND ALLODERM performed by Cheryl Carver MD at [facility] AMBULATORY OR  
 LA ERCP REMOVE CALCULI/DEBRIS BILIARY/PANCREAS DUCT   3/25/2016  
     
 LA ERCP W/SPHINCTEROTOMY/PAPILLOTOMY   3/25/2016  
     
  
     
Family History Problem Relation Age of Onset  Heart Disease Mother    
 Stroke Father    
 Asthma Sister    
 Hypertension Sister    
  
Social History  
  
     
Tobacco Use  Smoking status: Former Smoker  
    Packs/day: 2.00  
    Years: 30.00  
    Pack years: 60.00  
    Types: Cigarettes     Last attempt to quit: 2013  
    Years since quittin.1  Smokeless tobacco: Never Used Substance Use Topics  Alcohol use: Yes  
    Alcohol/week: 4.2 oz  
    Types: 7 Glasses of wine per week Prior to Admission medications Medication Sig Start Date End Date Taking? Authorizing Provider  
omeprazole (PRILOSEC) 20 mg capsule Take 20 mg by mouth nightly. 19     Eric Clancy MD  
oxyCODONE-acetaminophen (PERCOCET) 5-325 mg per tablet Take 1 Tab by mouth every four (4) hours as needed for Pain. Max Daily Amount: 6 Tabs. 18     Leighton Briceño MD  
lidocaine-prilocaine (EMLA) topical cream Apply  to affected area as needed for Pain. 18     No Conner MD  
ondansetron (ZOFRAN ODT) 4 mg disintegrating tablet Take 1 Tab by mouth every eight (8) hours as needed for Nausea. 18     No Conner MD  
dexamethasone (DECADRON) 4 mg tablet Take 8 mg by mouth two (2) times daily (with meals). Take two times a day the day before, day of, and day after chemotherapy. 18     No Conner MD  
diphenhydrAMINE (BENADRYL) 25 mg capsule Take 25 mg by mouth as needed.       Provider, Historical  
oxymetazoline HCl (OXYMETAZOLINE NA) by Nasal route as needed.       Provider, Historical  
atorvastatin (LIPITOR) 10 mg tablet TAKE 1 TABLET BY MOUTH AT BEDTIME 18     Eric Clancy MD  
amLODIPine (NORVASC) 10 mg tablet take 1 tablet by mouth once daily 3/27/18     Eric Clancy MD  
lisinopril-hydroCHLOROthiazide Mal[place] Part, ZESTORETIC) 20-12.5 mg per tablet take 2 tablets by mouth once daily 3/27/18     Eric Clancy MD  
levothyroxine (SYNTHROID) 25 mcg tablet take 1 tablet by mouth once daily BEFORE BREAKFAST 3/27/18     Eric Clancy MD  
  
  
No Known Allergies 
  
  
Objective:  
  
   
Vitals:  
  19 1635 BP: 139/75 Pulse: 97 Resp: 18 Temp: 98.8 °F (37.1 °C) SpO2: 97% Weight: 254 lb (115.2 kg) Height: 5' 1\" (1.549 m)  
  
  
Physical Exam: 
  
GENERAL: alert, cooperative EYE: negative LYMPHATIC: Cervical, supraclavicular, and axillary nodes normal.  
THROAT & NECK: normal and no erythema or exudates noted. LUNG: clear to auscultation bilaterally HEART: regular rate and rhythm ABDOMEN: soft, non-tender EXTREMITIES: no cyanosis or edema SKIN: no rash NEUROLOGIC: negative 
  
  
     
Lab Results Component Value Date/Time  
  WBC 10.9 02/21/2019 10:59 AM  
  Hemoglobin (POC) 12.9 02/19/2016 07:35 AM  
  HGB 9.9 (L) 02/21/2019 10:59 AM  
  Hematocrit (POC) 38 02/19/2016 07:35 AM  
  HCT 29.2 (L) 02/21/2019 10:59 AM  
  PLATELET 317 87/57/6557 10:59 AM  
  MCV 97.3 02/21/2019 10:59 AM  
  
  
     
Lab Results Component Value Date/Time  
  Sodium 136 02/21/2019 10:59 AM  
  Potassium 4.0 02/21/2019 10:59 AM  
  Chloride 103 02/21/2019 10:59 AM  
  CO2 25 02/21/2019 10:59 AM  
  Anion gap 8 02/21/2019 10:59 AM  
  Glucose 131 (H) 02/21/2019 10:59 AM  
  BUN 18 02/21/2019 10:59 AM  
  Creatinine 0.96 02/21/2019 10:59 AM  
  BUN/Creatinine ratio 19 02/21/2019 10:59 AM  
  GFR est AA >60 02/21/2019 10:59 AM  
  GFR est non-AA >60 02/21/2019 10:59 AM  
  Calcium 9.2 02/21/2019 10:59 AM  
  Bilirubin, total 0.3 02/21/2019 10:59 AM  
  AST (SGOT) 21 02/21/2019 10:59 AM  
  Alk. phosphatase 65 02/21/2019 10:59 AM  
  Protein, total 6.9 02/21/2019 10:59 AM  
  Albumin 3.9 02/21/2019 10:59 AM  
  Globulin 3.0 02/21/2019 10:59 AM  
  A-G Ratio 1.3 02/21/2019 10:59 AM  
  ALT (SGPT) 37 02/21/2019 10:59 AM  
  
  
  
Assessment:  
  
1. Recurrent breast carcinoma Right chest wall  
  
2. Right breast carcinoma: 
T2 N0 M0 (Stage IIA) infiltrating ductal carcinoma, Tumor size 2.2 cm and 3.5 cm, LN -ve, grade 1, ER 95%, SD 90%, Her 2 -ve 
  
ECOG PS 0 Intent of treatment - curative Prognosis - good 
  
S/P B/L BREAST SKIN SPARING MASTECTOMY, RIGHT SENTINEL NODE BIOPSY, 09/24/2015 
  
 Excision of the right sided chest wall/breast mass 10/19/2018 
  
The initial Mammaprint was high risk. Since it took a good 8 months to fully recover from complication of her breast surgery, I did not recommend adjuvant chemotherapy. She was poorly compliant with AI.  
  
Due to recurrent breast cancer, she is receiving adjuvant chemotherapy Taxotere + Cytoxan cycle 4 day 1 
  
Tolerating treatment A detailed system by system evaluation of side effect was performed to assess chemotherapy related toxicity. Blood counts are acceptable. Results reviewed with the patient. 
  
  
2.  Anemia from chemotherapy 
  
Observation 
  
  
Plan:  
  
Port removal

## 2019-04-08 DIAGNOSIS — E03.9 HYPOTHYROIDISM, UNSPECIFIED TYPE: Chronic | ICD-10-CM

## 2019-04-08 DIAGNOSIS — E78.2 MIXED HYPERLIPIDEMIA: ICD-10-CM

## 2019-04-08 DIAGNOSIS — I10 ESSENTIAL HYPERTENSION: Chronic | ICD-10-CM

## 2019-04-08 RX ORDER — AMLODIPINE BESYLATE 10 MG/1
10 TABLET ORAL DAILY
Qty: 90 TAB | Refills: 0 | Status: SHIPPED | OUTPATIENT
Start: 2019-04-08 | End: 2019-07-11 | Stop reason: SDUPTHER

## 2019-04-08 RX ORDER — ATORVASTATIN CALCIUM 10 MG/1
10 TABLET, FILM COATED ORAL
Qty: 90 TAB | Refills: 0 | Status: SHIPPED | OUTPATIENT
Start: 2019-04-08 | End: 2019-07-11 | Stop reason: SDUPTHER

## 2019-04-08 RX ORDER — LEVOTHYROXINE SODIUM 25 UG/1
25 TABLET ORAL
Qty: 90 TAB | Refills: 0 | Status: SHIPPED | OUTPATIENT
Start: 2019-04-08 | End: 2019-07-11 | Stop reason: SDUPTHER

## 2019-04-08 NOTE — TELEPHONE ENCOUNTER
Last Visit: 03/27/2018 with MD Bertha Fitch  Next Appointment: 04/17/2019 with MD Bertha Fitch  Previous Refill Encounter(s): 03/11/2019 per MD Cm Hernandezvasc, Lipitor, Synthroid #[id number]    Requested Prescriptions     Pending Prescriptions Disp Refills    amLODIPine (NORVASC) 10 mg tablet 90 Tab 0     Sig: Take 1 Tab by mouth daily.  levothyroxine (SYNTHROID) 25 mcg tablet 90 Tab 0     Sig: Take 1 Tab by mouth Daily (before breakfast).  atorvastatin (LIPITOR) 10 mg tablet 90 Tab 0     Sig: Take 1 Tab by mouth nightly.

## 2019-04-17 ENCOUNTER — OFFICE VISIT (OUTPATIENT)
Dept: FAMILY MEDICINE CLINIC | Age: 59
End: 2019-04-17

## 2019-04-17 VITALS
TEMPERATURE: 96.3 F | RESPIRATION RATE: 18 BRPM | DIASTOLIC BLOOD PRESSURE: 64 MMHG | WEIGHT: 247.8 LBS | HEIGHT: 62 IN | HEART RATE: 85 BPM | BODY MASS INDEX: 45.6 KG/M2 | SYSTOLIC BLOOD PRESSURE: 112 MMHG | OXYGEN SATURATION: 97 %

## 2019-04-17 DIAGNOSIS — C50.211 MALIGNANT NEOPLASM OF UPPER-INNER QUADRANT OF RIGHT FEMALE BREAST, UNSPECIFIED ESTROGEN RECEPTOR STATUS (HCC): ICD-10-CM

## 2019-04-17 DIAGNOSIS — K21.9 GASTROESOPHAGEAL REFLUX DISEASE, ESOPHAGITIS PRESENCE NOT SPECIFIED: ICD-10-CM

## 2019-04-17 DIAGNOSIS — I10 ESSENTIAL HYPERTENSION: ICD-10-CM

## 2019-04-17 DIAGNOSIS — J30.1 SEASONAL ALLERGIC RHINITIS DUE TO POLLEN: ICD-10-CM

## 2019-04-17 DIAGNOSIS — E27.8 ADRENAL INCIDENTALOMA (HCC): ICD-10-CM

## 2019-04-17 DIAGNOSIS — Z00.00 WELL ADULT EXAM: Primary | ICD-10-CM

## 2019-04-17 DIAGNOSIS — F17.210 SMOKING GREATER THAN 30 PACK YEARS: ICD-10-CM

## 2019-04-17 DIAGNOSIS — Z12.11 COLON CANCER SCREENING: ICD-10-CM

## 2019-04-17 DIAGNOSIS — E03.9 HYPOTHYROIDISM, UNSPECIFIED TYPE: ICD-10-CM

## 2019-04-17 DIAGNOSIS — E78.2 MIXED HYPERLIPIDEMIA: ICD-10-CM

## 2019-04-17 RX ORDER — MONTELUKAST SODIUM 10 MG/1
10 TABLET ORAL DAILY
Qty: 90 TAB | Refills: 0 | Status: SHIPPED | OUTPATIENT
Start: 2019-04-17 | End: 2019-07-11 | Stop reason: SDUPTHER

## 2019-04-17 RX ORDER — LISINOPRIL AND HYDROCHLOROTHIAZIDE 12.5; 2 MG/1; MG/1
TABLET ORAL
Qty: 180 TAB | Refills: 3 | Status: SHIPPED | OUTPATIENT
Start: 2019-04-17 | End: 2019-10-31 | Stop reason: SDUPTHER

## 2019-04-17 RX ORDER — OMEPRAZOLE 20 MG/1
CAPSULE, DELAYED RELEASE ORAL
Qty: 90 CAP | Refills: 1 | Status: SHIPPED | OUTPATIENT
Start: 2019-04-17 | End: 2019-10-31 | Stop reason: SDUPTHER

## 2019-04-17 NOTE — PROGRESS NOTES
Subjective: Chief Complaint Patient presents with  Complete Physical  
  Annual  
  
She  is a 64 y.o. female who presents for evaluation of: CPE Since last appt, she was found to have a recurrent malignant R breast carcinoma (T2N0, Stage IIA) infiltrating ductal carcinoma. She is s/p sgy and chemo. Will start radiation next week. She does report being poorly compliant with Femara after her initial breast cancer. Never had colonoscopy. LMP was > 10 yrs ago. Last pap done at the [facility] 2-3 yrs ago and she declines looking into this today. Feeling well otherwise. Not seeing eye doctor or dentist. 
 
HTN - Doing well on current meds with no medication side effects noted. BPs at home are well controlled. No TIA's, no chest pain on exertion, no dyspnea on exertion, no swelling of ankles. Exercising -no Dieting -no Smoking - No, previously 2 pack per day smoker ×30 years and quit in 2013 Hypothyroidism - No sx. On very low dose of Synthroid. Working on weight loss. ROS: 
Constitutional: negative except for fevers, chills and fatigue Eyes: negative for visual disturbance Respiratory: negative for cough or dyspnea on exertion Cardiovascular: negative for chest pain, dyspnea, palpitations, fatigue Gastrointestinal: negative for nausea, vomiting, change in bowel habits, diarrhea and abdominal pain Genitourinary:negative for frequency and dysuria Integument/breast: rash as above. Musculoskeletal:negative for myalgias and muscle weakness Neurological: negative for headaches and dizziness H/O - Hx right breast cancer 7/2015 - sgy with Dr. Leon Bowden and Dr. Shelli Dimas. Recurrence as above. Behavioral/Psych: negative for anxiety and depression Objective:  
 
Vitals:  
 04/17/19 1345 BP: 112/64 Pulse: 85 Resp: 18 Temp: 96.3 °F (35.7 °C) TempSrc: Oral  
SpO2: 97% Weight: 247 lb 12.8 oz (112.4 kg) Height: 5' 2\" (1.575 m) Physical Examination: General appearance - alert, well appearing, and in no distress Eyes -sclera anicteric Ears bilateral normal TMs Mouth - mucous membranes moist, pharynx normal without lesions Neck - supple, no significant adenopathy, no thyromegaly, no bruits Chest - clear to auscultation, no wheezes, rales or rhonchi, symmetric air entry Heart - normal rate, regular rhythm, normal S1, S2, no murmurs, rubs, clicks or gallops Abdomen - soft, nontender, nondistended, no masses or organomegaly Breasts & Pelvic -declines, seeing OB/GYN for this Neurological - alert, oriented, normal speech, no focal findings or movement disorder noted Extr - no edema Skin - erythematous rosacea appearing facial rash Past Medical History:  
Diagnosis Date  Acid reflux  Adverse effect of anesthesia   
 sister has a lot of allergies and issues with anesthesia per pt  BPPV (benign paroxysmal positional vertigo)  Breast cancer ([address] 7/2014  
 right  Environmental allergies  Gallstones Negative workup in 2009  GERD (gastroesophageal reflux disease)  HTN (hypertension) 2/24/2015  Hypertension 9/19/2013  Hypertension  Hypoactive thyroid  Hypothyroidism 2/24/2015  Mixed hyperlipidemia 5/12/2016  Morbid obesity ([address]  Prediabetes 5/12/2016 Past Surgical History:  
Procedure Laterality Date  HX BREAST BIOPSY Right 12/7/2018 RIGHT BREAST SKIN EXCISIONAL BIOPSY, performed by Matt Dimas MD at [facility] AMBULATORY OR  
 HX BREAST RECONSTRUCTION Bilateral 9/24/2015 BREAST RECONSTRUCTION performed by Eboni Lambert MD at [place] [address] HX BREAST RECONSTRUCTION Right 11/10/2015 REMOVAL RIGHT BREAST TISSUE EXPANDER performed by Eboni Lambert MD at [facility] MAIN OR  
 HX BREAST RECONSTRUCTION Right 2/19/2016 PLACEMENT RIGHT TISSUE EXPANDER  performed by Eboni Lambert MD at [facility] MAIN OR  
 HX BREAST RECONSTRUCTION Bilateral 10/21/2016 EXCHANGE BILATERAL TISSUE EXPANDER FOR BREAST IMPLANT performed by Shiv Neff MD at [facility] MAIN OR  
[address] Vaginal deliveries  HX LAP CHOLECYSTECTOMY  HX MASTECTOMY Bilateral 9/24/2015 BILATERAL BREAST SKIN SPARING MASTECTOMY, RIGHT SENTINEL NODE BIOPSY/BILATERAL BREAST RECONSTRUCTION W/TISSUE EXPANDERS AND ALLODERM performed by Amita Otero MD at hospitals AMBULATORY OR  
 HX VASCULAR ACCESS  12/2018  
 upper left chest  
 NE ERCP REMOVE CALCULI/DEBRIS BILIARY/PANCREAS DUCT  3/25/2016  NE ERCP W/SPHINCTEROTOMY/PAPILLOTOMY  3/25/2016 Current Outpatient Medications on File Prior to Visit Medication Sig Dispense Refill  amLODIPine (NORVASC) 10 mg tablet Take 1 Tab by mouth daily. 90 Tab 0  
 levothyroxine (SYNTHROID) 25 mcg tablet Take 1 Tab by mouth Daily (before breakfast). 90 Tab 0  
 atorvastatin (LIPITOR) 10 mg tablet Take 1 Tab by mouth nightly. 90 Tab 0  
 diphenhydrAMINE (BENADRYL) 25 mg capsule Take 25 mg by mouth daily.  oxymetazoline HCl (OXYMETAZOLINE NA) by Nasal route as needed. No current facility-administered medications on file prior to visit. No Known Allergies Assessment/ Plan:  
Diagnoses and all orders for this visit: 
 
1. Well adult exam 
-     CBC W/O DIFF; Future 
-     HEMOGLOBIN A1C WITH EAG; Future -     LIPID PANEL; Future -     METABOLIC PANEL, COMPREHENSIVE; Future 
-     TSH 3RD GENERATION; Future -     OCCULT BLOOD IMMUNOASSAY,DIAGNOSTIC 2. Essential hypertension - controlled -     METABOLIC PANEL, COMPREHENSIVE; Future 
-     lisinopril-hydroCHLOROthiazide (PRINZIDE, ZESTORETIC) 20-12.5 mg per tablet; take 2 tablets by mouth once daily 3. Hypothyroidism, unspecified type - prev stable, rechecking labs 
-     TSH 3RD GENERATION; Future 4. Mixed hyperlipidemia - stable, rechecking labs 
-     HEMOGLOBIN A1C WITH EAG; Future -     LIPID PANEL; Future -     METABOLIC PANEL, COMPREHENSIVE; Future -     TSH 3RD GENERATION; Future 5. Malignant neoplasm of upper-inner quadrant of right female breast, unspecified estrogen receptor status ([facility] - per Onc, Sgy, and Rad Onc. Encouraged restarting Femara after all of this. 6. Adrenal incidentaloma ([facility] - prev ordered  CT with adrenal mass protocol but never had this done 7. BMI 45.0-49.9, adult ([facility] Discussed the patient's BMI. The BMI follow up plan is as follows:  
dietary management education, guidance, and counseling 
encourage exercise 
monitor weight 
prescribed dietary intake 
-     TSH 3RD GENERATION; Future 8. Seasonal allergic rhinitis due to pollen - will try singulair. Ct flonase 
-     montelukast (SINGULAIR) 10 mg tablet; Take 1 Tab by mouth daily. Indications: inflammation of the nose due to an allergy 9. Colon cancer screening - discussed benefits of FIT test again, encouraged colonoscopy -     OCCULT BLOOD IMMUNOASSAY,DIAGNOSTIC 10. Gastroesophageal reflux disease, esophagitis presence not specified 
-     omeprazole (PRILOSEC) 20 mg capsule; Take 20 mg by mouth nightly. 11. Smoking greater than 30 pack years-  -smoked nearly 2 packs per day for nearly 30 years and quit in 2013. She is asymptomatic but declines getting the low-dose chest CT for lung cancer screening at this point. Would still like to eventually get her to have this done annually if able I have discussed the diagnosis with the patient and the intended plan as seen in the above orders. The patient has received an after-visit summary and questions were answered concerning future plans. I have discussed medication side effects and warnings with the patient as well. Follow-up and Dispositions · Return in about 6 months (around 10/17/2019). Pt moving to [place] so encouraged her to f/u with a new PCP in the area.

## 2019-04-17 NOTE — PROGRESS NOTES
Chief Complaint Patient presents with  Complete Physical  
  Annual  
1. Have you been to the ER, urgent care clinic since your last visit? Hospitalized since your last visit? No 
 
2. Have you seen or consulted any other health care providers outside of the [address] [place] [address] since your last visit? Include any pap smears or colon screening. No  
Finished chemotherapy and Radiation will start 4/22/19

## 2019-04-19 DIAGNOSIS — I10 ESSENTIAL HYPERTENSION: ICD-10-CM

## 2019-04-19 RX ORDER — LISINOPRIL AND HYDROCHLOROTHIAZIDE 12.5; 2 MG/1; MG/1
TABLET ORAL
Qty: 180 TAB | Refills: 3 | Status: CANCELLED | OUTPATIENT
Start: 2019-04-19

## 2019-04-30 ENCOUNTER — TELEPHONE (OUTPATIENT)
Dept: ONCOLOGY | Age: 59
End: 2019-04-30

## 2019-04-30 NOTE — TELEPHONE ENCOUNTER
Finished Chemo in March.  Wants to know when she is supposed to start taking her Hormone meds,    4/30/19  patel

## 2019-04-30 NOTE — TELEPHONE ENCOUNTER
Return call placed to pt. HIPPA verified. Patient reports she has had 3 treatments of radiation but she not going to put herself through the other 35 treatments. NP notified. Pt informed her hormone medication will be sent to her pharmacy by NP, she can start the medication once she get's it. Pt transferred to [facility] to schedule a f/u appt.

## 2019-05-02 ENCOUNTER — TELEPHONE (OUTPATIENT)
Dept: ONCOLOGY | Age: 59
End: 2019-05-02

## 2019-05-02 DIAGNOSIS — C50.211 MALIGNANT NEOPLASM OF UPPER-INNER QUADRANT OF RIGHT BREAST IN FEMALE, ESTROGEN RECEPTOR POSITIVE (HCC): Primary | ICD-10-CM

## 2019-05-02 DIAGNOSIS — Z17.0 MALIGNANT NEOPLASM OF UPPER-INNER QUADRANT OF RIGHT BREAST IN FEMALE, ESTROGEN RECEPTOR POSITIVE (HCC): Primary | ICD-10-CM

## 2019-05-02 RX ORDER — LETROZOLE 2.5 MG/1
2.5 TABLET, FILM COATED ORAL DAILY
Qty: 30 TAB | Refills: 6 | Status: SHIPPED | OUTPATIENT
Start: 2019-05-02 | End: 2019-10-31 | Stop reason: SDUPTHER

## 2019-05-16 ENCOUNTER — TELEPHONE (OUTPATIENT)
Dept: FAMILY MEDICINE CLINIC | Age: 59
End: 2019-05-16

## 2019-05-16 NOTE — TELEPHONE ENCOUNTER
Sent patient message through my chart that Dr Digna Reed needs to see her and we have appointment available for tomorrow.

## 2019-05-16 NOTE — TELEPHONE ENCOUNTER
----- Message from Florian Quentinmalik sent at 5/16/2019  1:12 PM EDT -----  Regarding: Laci Carlos MD / telephone  Pt states that she would need something different for her congestion. Her last prescription was for cingulair and it is not working. Pt states that she has a sinus infection. [facility] uses [facility] on [address]. Pts contact [phone].

## 2019-05-17 DIAGNOSIS — Z00.00 WELL ADULT EXAM: ICD-10-CM

## 2019-05-17 DIAGNOSIS — I10 ESSENTIAL HYPERTENSION: ICD-10-CM

## 2019-05-17 DIAGNOSIS — E78.2 MIXED HYPERLIPIDEMIA: ICD-10-CM

## 2019-05-17 DIAGNOSIS — E03.9 HYPOTHYROIDISM, UNSPECIFIED TYPE: ICD-10-CM

## 2019-05-20 ENCOUNTER — OFFICE VISIT (OUTPATIENT)
Dept: FAMILY MEDICINE CLINIC | Age: 59
End: 2019-05-20

## 2019-05-20 VITALS
DIASTOLIC BLOOD PRESSURE: 64 MMHG | BODY MASS INDEX: 44.16 KG/M2 | TEMPERATURE: 97.8 F | OXYGEN SATURATION: 98 % | HEIGHT: 62 IN | WEIGHT: 240 LBS | RESPIRATION RATE: 16 BRPM | HEART RATE: 72 BPM | SYSTOLIC BLOOD PRESSURE: 110 MMHG

## 2019-05-20 DIAGNOSIS — J30.1 SEASONAL ALLERGIC RHINITIS DUE TO POLLEN: ICD-10-CM

## 2019-05-20 DIAGNOSIS — J32.9 CHRONIC SINUSITIS, UNSPECIFIED LOCATION: ICD-10-CM

## 2019-05-20 DIAGNOSIS — F17.210 SMOKING GREATER THAN 30 PACK YEARS: ICD-10-CM

## 2019-05-20 DIAGNOSIS — C50.211 MALIGNANT NEOPLASM OF UPPER-INNER QUADRANT OF RIGHT BREAST IN FEMALE, ESTROGEN RECEPTOR POSITIVE (HCC): ICD-10-CM

## 2019-05-20 DIAGNOSIS — T48.5X5A NASAL CONGESTION DUE TO PROLONGED USE OF DECONGESTANTS: Primary | ICD-10-CM

## 2019-05-20 DIAGNOSIS — R09.81 NASAL CONGESTION DUE TO PROLONGED USE OF DECONGESTANTS: Primary | ICD-10-CM

## 2019-05-20 DIAGNOSIS — Z17.0 MALIGNANT NEOPLASM OF UPPER-INNER QUADRANT OF RIGHT BREAST IN FEMALE, ESTROGEN RECEPTOR POSITIVE (HCC): ICD-10-CM

## 2019-05-20 RX ORDER — PREDNISONE 20 MG/1
40 TABLET ORAL DAILY
Qty: 10 TAB | Refills: 0 | Status: SHIPPED | OUTPATIENT
Start: 2019-05-20 | End: 2019-05-25

## 2019-05-20 RX ORDER — BENZOCAINE .13; .15; .5; 2 G/100G; G/100G; G/100G; G/100G
2 GEL ORAL DAILY
Qty: 1 BOTTLE | Refills: 0 | Status: SHIPPED | OUTPATIENT
Start: 2019-05-20 | End: 2021-12-08

## 2019-05-20 RX ORDER — AMOXICILLIN AND CLAVULANATE POTASSIUM 875; 125 MG/1; MG/1
1 TABLET, FILM COATED ORAL 2 TIMES DAILY
Qty: 10 TAB | Refills: 0 | Status: SHIPPED | OUTPATIENT
Start: 2019-05-20 | End: 2019-05-25

## 2019-05-20 NOTE — PROGRESS NOTES
Subjective:     Chief Complaint   Patient presents with    Sinus Pain     two weeks        She  is a 64y.o. year old female who presents for evaluation of:    Upper Respiratory Infection  Patient complains of symptoms of a URI. Symptoms include congestion, sore throat, bloody nose. Onset of symptoms was 2 weeks ago, unchanged since that time. She is drinking plenty of fluids. Evaluation to date: none. Treatment to date: Using Mucinex nasal spray (nasal decongestant) more than 5 times per day, Benadryl, Claritin, singulair. Feels like her nasal congestion started after chemo for her breast cancer.     ROS:  Constitutional: per HPI  Eyes: negative for visual disturbance  Ears, nose, mouth, throat, and face: per HPI  Respiratory: per HPI  Cardiovascular: negative for chest pain, dyspnea, palpitations  Gastrointestinal: negative for nausea, vomiting, diarrhea and abdominal pain  Integument/breast: negative for rash    Objective:     Vitals:    19 1608   BP: 110/64   Pulse: 72   Resp: 16   Temp: 97.8 °F (36.6 °C)   TempSrc: Oral   SpO2: 98%   Weight: 240 lb (108.9 kg)   Height: 5' 2\" (1.575 m)     Physical Examination:   Gen - NAD  Eyes - sclera anicteric  ENT -TMs normal bilaterally, turbinates significantly inflamed bilat, very mild maxillary sinus tenderness, post pharynx erythematous with no exudate  Resp - CTAB, no w/r/r  CV - rrr, no m/r/g    No Known Allergies   Social History     Socioeconomic History    Marital status: SINGLE     Spouse name: Not on file    Number of children: Not on file    Years of education: Not on file    Highest education level: Not on file   Occupational History    Occupation:  of home [employer] agency   Tobacco Use    Smoking status: Former Smoker     Packs/day: 2.00     Years: 30.00     Pack years: 60.00     Types: Cigarettes     Last attempt to quit: 2013     Years since quittin.3    Smokeless tobacco: Never Used   Substance and Sexual Activity    Alcohol use: Yes     Alcohol/week: 4.2 oz     Types: 7 Glasses of wine per week    Drug use: Never    Sexual activity: Yes     Partners: Male     Birth control/protection: None   Social History Narrative    ** Merged History Encounter **           Family History   Problem Relation Age of Onset    Heart Disease Mother     Stroke Father     Asthma Sister     Hypertension Sister       Past Surgical History:   Procedure Laterality Date    HX BREAST BIOPSY Right 12/7/2018    RIGHT BREAST SKIN EXCISIONAL BIOPSY, performed by Gissell Bradford MD at [facility] AMBULATORY OR    HX BREAST RECONSTRUCTION Bilateral 9/24/2015    BREAST RECONSTRUCTION performed by Rosendo Moon MD at [address] [place] [address] Right 11/10/2015    REMOVAL RIGHT BREAST TISSUE EXPANDER performed by Rosendo Moon MD at \[facility]"" MAIN OR    HX BREAST RECONSTRUCTION Right 2/19/2016    PLACEMENT RIGHT TISSUE EXPANDER  performed by Rosendo Moon MD at \[facility]"" MAIN OR    HX BREAST RECONSTRUCTION Bilateral 10/21/2016    EXCHANGE BILATERAL TISSUE EXPANDER FOR BREAST IMPLANT performed by Rosendo Moon MD at \[facility]\"" MAIN OR   [address]    Vaginal deliveries    HX LAP CHOLECYSTECTOMY      HX MASTECTOMY Bilateral 9/24/2015    BILATERAL BREAST SKIN SPARING MASTECTOMY, RIGHT SENTINEL NODE BIOPSY/BILATERAL BREAST RECONSTRUCTION W/TISSUE EXPANDERS AND ALLODERM performed by Lulú Daugherty MD at [facility] AMBULATORY OR    HX VASCULAR ACCESS  12/2018    upper left chest    SD ERCP REMOVE CALCULI/DEBRIS BILIARY/PANCREAS DUCT  3/25/2016         SD ERCP W/SPHINCTEROTOMY/PAPILLOTOMY  3/25/2016           Past Medical History:   Diagnosis Date    Acid reflux     Adverse effect of anesthesia     sister has a lot of allergies and issues with anesthesia per pt    BPPV (benign paroxysmal positional vertigo)     Breast [facility] ([facility] 7/2014    right    Environmental allergies     Gallstones     Negative workup in 2009    GERD (gastroesophageal reflux disease)     HTN (hypertension) 2/24/2015    Hypertension 9/19/2013    Hypertension     Hypoactive thyroid     Hypothyroidism 2/24/2015    Mixed hyperlipidemia 5/12/2016    Morbid obesity ([address]     Prediabetes 5/12/2016      Current Outpatient Medications   Medication Sig Dispense Refill    budesonide (RHINOCORT AQUA) 32 mcg/actuation nasal spray 2 Sprays by Both Nostrils route daily. 1 Bottle 0    predniSONE (DELTASONE) 20 mg tablet Take 40 mg by mouth daily for 5 days. 10 Tab 0    amoxicillin-clavulanate (AUGMENTIN) 875-125 mg per tablet Take 1 Tab by mouth two (2) times a day for 5 days. 10 Tab 0    letrozole (FEMARA) 2.5 mg tablet Take 1 Tab by mouth daily. 30 Tab 6    montelukast (SINGULAIR) 10 mg tablet Take 1 Tab by mouth daily. Indications: inflammation of the nose due to an allergy 90 Tab 0    lisinopril-hydroCHLOROthiazide (PRINZIDE, ZESTORETIC) 20-12.5 mg per tablet take 2 tablets by mouth once daily 180 Tab 3    omeprazole (PRILOSEC) 20 mg capsule Take 20 mg by mouth nightly. 90 Cap 1    amLODIPine (NORVASC) 10 mg tablet Take 1 Tab by mouth daily. 90 Tab 0    levothyroxine (SYNTHROID) 25 mcg tablet Take 1 Tab by mouth Daily (before breakfast). 90 Tab 0    atorvastatin (LIPITOR) 10 mg tablet Take 1 Tab by mouth nightly. 90 Tab 0    diphenhydrAMINE (BENADRYL) 25 mg capsule Take 25 mg by mouth daily. Assessment/ Plan:   Diagnoses and all orders for this visit:    1. Nasal congestion due to prolonged use of decongestantssuspect nasal congestion is largely related to extensive use of nasal decongestant (Mucinex nasal spray). Stopping this and will switch over to nasal steroid to decrease inflammation. Will also use prednisone burst given extent of inflammation on exam  -     budesonide (RHINOCORT AQUA) 32 mcg/actuation nasal spray; 2 Sprays by Both Nostrils route daily. -     predniSONE (DELTASONE) 20 mg tablet; Take 40 mg by mouth daily for 5 days.     2. Chronic sinusitis, unspecified locationcovering for sinusitis given long duration of symptoms  -     budesonide (RHINOCORT AQUA) 32 mcg/actuation nasal spray; 2 Sprays by Both Nostrils route daily. -     predniSONE (DELTASONE) 20 mg tablet; Take 40 mg by mouth daily for 5 days. -     amoxicillin-clavulanate (AUGMENTIN) 875-125 mg per tablet; Take 1 Tab by mouth two (2) times a day for 5 days. 3. Smoking greater than 30 pack yearsencourage smoking cessation    4. Seasonal allergic rhinitis due to pollenencouraged continued treatment of allergies with antihistamine, Singulair, and nasal steroids    5. Malignant neoplasm of upper-inner quadrant of right breast in female, estrogen receptor positive ([facility]     I spent > 50% of the 25 min visit counseling and educating about: Rebound vasoconstriction from excessive nasal decongestant use, role of allergies and treatment options, and benefits and risks of steroid burst.    I have discussed the diagnosis with the patient and the intended plan as seen in the above orders. The patient has received an after-visit summary and questions were answered concerning future plans. I have discussed medication side effects and warnings with the patient as well. The patient verbalizes understanding and agreement with the plan. Follow-up and Dispositions    · Return in about 1 month (around 6/17/2019), or if symptoms worsen or fail to improve.

## 2019-06-14 LAB — HEMOCCULT STL QL IA: NEGATIVE

## 2019-07-08 DIAGNOSIS — T48.5X5A NASAL CONGESTION DUE TO PROLONGED USE OF DECONGESTANTS: ICD-10-CM

## 2019-07-08 DIAGNOSIS — R09.81 NASAL CONGESTION DUE TO PROLONGED USE OF DECONGESTANTS: ICD-10-CM

## 2019-07-08 DIAGNOSIS — J32.9 CHRONIC SINUSITIS, UNSPECIFIED LOCATION: Primary | ICD-10-CM

## 2019-07-10 DIAGNOSIS — E78.2 MIXED HYPERLIPIDEMIA: ICD-10-CM

## 2019-07-10 DIAGNOSIS — E03.9 HYPOTHYROIDISM, UNSPECIFIED TYPE: Chronic | ICD-10-CM

## 2019-07-10 DIAGNOSIS — I10 ESSENTIAL HYPERTENSION: Chronic | ICD-10-CM

## 2019-07-10 DIAGNOSIS — J30.1 SEASONAL ALLERGIC RHINITIS DUE TO POLLEN: ICD-10-CM

## 2019-07-10 NOTE — TELEPHONE ENCOUNTER
----- Message from Johnson Escobedo sent at 7/10/2019  9:52 AM EDT -----  Regarding: Dr. Charmaine Croft: PT  Reason for call: 1- to enquire about who Dr. Eber Jaramillo referred her to for ENT- name and number so she can call them. 2- update on 3 medications refill requested from [facility] requested: YES  Best contact: [phone]  Additional details: PT does not know the name of the medications, she threw the bottles away.

## 2019-07-11 ENCOUNTER — TELEPHONE (OUTPATIENT)
Dept: FAMILY MEDICINE CLINIC | Age: 59
End: 2019-07-11

## 2019-07-11 RX ORDER — ATORVASTATIN CALCIUM 10 MG/1
10 TABLET, FILM COATED ORAL
Qty: 90 TAB | Refills: 0 | Status: SHIPPED | OUTPATIENT
Start: 2019-07-11 | End: 2019-10-31 | Stop reason: SDUPTHER

## 2019-07-11 RX ORDER — MONTELUKAST SODIUM 10 MG/1
10 TABLET ORAL DAILY
Qty: 90 TAB | Refills: 0 | Status: SHIPPED | OUTPATIENT
Start: 2019-07-11 | End: 2020-05-04

## 2019-07-11 RX ORDER — LEVOTHYROXINE SODIUM 25 UG/1
25 TABLET ORAL
Qty: 90 TAB | Refills: 0 | Status: SHIPPED | OUTPATIENT
Start: 2019-07-11 | End: 2019-10-31 | Stop reason: SDUPTHER

## 2019-07-11 RX ORDER — AMLODIPINE BESYLATE 10 MG/1
10 TABLET ORAL DAILY
Qty: 90 TAB | Refills: 0 | Status: SHIPPED | OUTPATIENT
Start: 2019-07-11 | End: 2019-10-31

## 2019-07-11 NOTE — TELEPHONE ENCOUNTER
Patient was advised per this nurse per Dr Rossy Hernandez that refill for 90 day supply but would need to see her in October for additional refills. Patient states she is moving in October to [place].

## 2019-07-11 NOTE — TELEPHONE ENCOUNTER
Pt reports she was speaking to a nurse about refills and told she had to come in for a visit but she knows she only needs to come in once a year     PSR let her know on 4/17/19 visit doctors note states she needs to come back in Oct 2019   Her response was \"I dont know why --- but I'm not coming in every 6 mos, I'll find another doctor\"

## 2019-08-05 NOTE — PERIOP NOTES
Permission denied to review discharge instructions and discuss private health information with friend Jayjay Liz Pt is ok that lottie receives anesthesia d/c instructions. Yes

## 2019-10-01 DIAGNOSIS — J30.1 SEASONAL ALLERGIC RHINITIS DUE TO POLLEN: ICD-10-CM

## 2019-10-01 DIAGNOSIS — E03.9 HYPOTHYROIDISM, UNSPECIFIED TYPE: Chronic | ICD-10-CM

## 2019-10-01 DIAGNOSIS — K21.9 GASTROESOPHAGEAL REFLUX DISEASE, ESOPHAGITIS PRESENCE NOT SPECIFIED: ICD-10-CM

## 2019-10-01 DIAGNOSIS — E78.2 MIXED HYPERLIPIDEMIA: ICD-10-CM

## 2019-10-01 DIAGNOSIS — I10 ESSENTIAL HYPERTENSION: Chronic | ICD-10-CM

## 2019-10-01 NOTE — TELEPHONE ENCOUNTER
Pt calling for refills -      lisinopril-hydroCHLOROthiazide (PRINZIDE, ZESTORETIC) 20-12.5 mg         omeprazole (PRILOSEC) 20 mg          amLODIPine (NORVASC) 10 mg tablet         atorvastatin (LIPITOR) 10 mg tablet       levothyroxine (SYNTHROID) 25 mcg          montelukast (SINGULAIR) 10 mg tablet     She has already received the messge to come in for an apptmnt - however, she states she cannot as she has movers coming in -  States her appt with [facility] doctor, Dr. Alice Marlow is 10/31/19 in [place], [place] where she is moving     Pls call to advise at [phone]

## 2019-10-04 RX ORDER — MONTELUKAST SODIUM 10 MG/1
10 TABLET ORAL DAILY
Qty: 30 TAB | Refills: 0 | OUTPATIENT
Start: 2019-10-04

## 2019-10-04 RX ORDER — LEVOTHYROXINE SODIUM 25 UG/1
25 TABLET ORAL
Qty: 30 TAB | Refills: 0 | OUTPATIENT
Start: 2019-10-04

## 2019-10-04 RX ORDER — OMEPRAZOLE 20 MG/1
CAPSULE, DELAYED RELEASE ORAL
Qty: 30 CAP | Refills: 0 | OUTPATIENT
Start: 2019-10-04

## 2019-10-04 RX ORDER — AMLODIPINE BESYLATE 10 MG/1
10 TABLET ORAL DAILY
Qty: 30 TAB | Refills: 0 | OUTPATIENT
Start: 2019-10-04

## 2019-10-04 RX ORDER — ATORVASTATIN CALCIUM 10 MG/1
10 TABLET, FILM COATED ORAL
Qty: 30 TAB | Refills: 0 | OUTPATIENT
Start: 2019-10-04

## 2019-10-31 ENCOUNTER — OFFICE VISIT (OUTPATIENT)
Dept: FAMILY MEDICINE CLINIC | Age: 59
End: 2019-10-31

## 2019-10-31 VITALS
HEIGHT: 61 IN | OXYGEN SATURATION: 97 % | RESPIRATION RATE: 20 BRPM | SYSTOLIC BLOOD PRESSURE: 139 MMHG | BODY MASS INDEX: 46.07 KG/M2 | HEART RATE: 86 BPM | DIASTOLIC BLOOD PRESSURE: 66 MMHG | WEIGHT: 244 LBS | TEMPERATURE: 98.7 F

## 2019-10-31 DIAGNOSIS — C50.211 MALIGNANT NEOPLASM OF UPPER-INNER QUADRANT OF RIGHT BREAST IN FEMALE, ESTROGEN RECEPTOR POSITIVE (HCC): ICD-10-CM

## 2019-10-31 DIAGNOSIS — E78.5 DYSLIPIDEMIA: ICD-10-CM

## 2019-10-31 DIAGNOSIS — K21.9 GASTROESOPHAGEAL REFLUX DISEASE, ESOPHAGITIS PRESENCE NOT SPECIFIED: ICD-10-CM

## 2019-10-31 DIAGNOSIS — I10 ESSENTIAL HYPERTENSION: ICD-10-CM

## 2019-10-31 DIAGNOSIS — E03.9 HYPOTHYROIDISM, UNSPECIFIED TYPE: Chronic | ICD-10-CM

## 2019-10-31 DIAGNOSIS — R60.0 PEDAL EDEMA: Primary | ICD-10-CM

## 2019-10-31 DIAGNOSIS — E78.2 MIXED HYPERLIPIDEMIA: ICD-10-CM

## 2019-10-31 DIAGNOSIS — Z17.0 MALIGNANT NEOPLASM OF UPPER-INNER QUADRANT OF RIGHT BREAST IN FEMALE, ESTROGEN RECEPTOR POSITIVE (HCC): ICD-10-CM

## 2019-10-31 RX ORDER — ATORVASTATIN CALCIUM 10 MG/1
10 TABLET, FILM COATED ORAL
Qty: 90 TAB | Refills: 3 | Status: SHIPPED | OUTPATIENT
Start: 2019-10-31 | End: 2021-12-13 | Stop reason: DRUGHIGH

## 2019-10-31 RX ORDER — LEVOTHYROXINE SODIUM 25 UG/1
25 TABLET ORAL
Qty: 90 TAB | Refills: 3 | Status: SHIPPED | OUTPATIENT
Start: 2019-10-31 | End: 2021-12-08

## 2019-10-31 RX ORDER — POTASSIUM CHLORIDE 750 MG/1
10 TABLET, EXTENDED RELEASE ORAL DAILY
Qty: 14 TAB | Refills: 1 | Status: SHIPPED | OUTPATIENT
Start: 2019-10-31 | End: 2020-01-22

## 2019-10-31 RX ORDER — ATENOLOL 25 MG/1
25 TABLET ORAL DAILY
Qty: 90 TAB | Refills: 3 | Status: SHIPPED | OUTPATIENT
Start: 2019-10-31 | End: 2020-08-05

## 2019-10-31 RX ORDER — OMEPRAZOLE 20 MG/1
CAPSULE, DELAYED RELEASE ORAL
Qty: 90 CAP | Refills: 3 | Status: SHIPPED | OUTPATIENT
Start: 2019-10-31 | End: 2020-08-05 | Stop reason: SDUPTHER

## 2019-10-31 RX ORDER — LETROZOLE 2.5 MG/1
2.5 TABLET, FILM COATED ORAL DAILY
Qty: 90 TAB | Refills: 3 | Status: SHIPPED | OUTPATIENT
Start: 2019-10-31 | End: 2020-08-05 | Stop reason: SDUPTHER

## 2019-10-31 RX ORDER — FUROSEMIDE 20 MG/1
20 TABLET ORAL DAILY
Qty: 14 TAB | Refills: 1 | Status: SHIPPED | OUTPATIENT
Start: 2019-10-31 | End: 2020-01-22

## 2019-10-31 RX ORDER — LISINOPRIL AND HYDROCHLOROTHIAZIDE 12.5; 2 MG/1; MG/1
TABLET ORAL
Qty: 180 TAB | Refills: 3 | Status: SHIPPED | OUTPATIENT
Start: 2019-10-31 | End: 2020-08-05 | Stop reason: SDUPTHER

## 2019-10-31 NOTE — PROGRESS NOTES
HPI  Brenton Nichols comes in to establish care. Pedal edema: she has bilateral leg swelling. She has been on Lasix daily with potassium supplementation. I will check labs. Denies tenderness or discoloration of the legs. Patient denies chest pain, chest tightness or shortness of breath. She denies wheeze, fever or chills. No cough. HTN: on prinzide 2 tabs daily. She is also on amlodipine. Blood pressure has been stable. Suspect amlodipine is causing her pedal edema. I will discontinue amlodipine and give atenolol  Hypothyroidism: on levothyroxine, recheck labs. In the meantime she will continue with the current treatment plan. She does need a refill of the Prinzide. We will send in prescription for this. Breast cancer:  She has a h/o right breast cancer and had mastectomy in 2015, has recurrence in 2017. Lumpectomy was done. Will refer to oncology. GERD: Patient is on Prilosec daily. We will continue with this medication. She denies hematemesis or dark stools. Has occasional heartburn. Dyslipidemia: Patient is on atorvastatin. She would like a refill of medication. Will check labs today. Given is sent in.     Past Medical History  Past Medical History:   Diagnosis Date    Acid reflux     Adverse effect of anesthesia     sister has a lot of allergies and issues with anesthesia per pt    BPPV (benign paroxysmal positional vertigo)     Breast cancer ([facility] 7/2014    right    Environmental allergies     Gallstones     Negative workup in 2009    GERD (gastroesophageal reflux disease)     HTN (hypertension) 2/24/2015    Hypertension 9/19/2013    Hypertension     Hypoactive thyroid     Hypothyroidism 2/24/2015    Mixed hyperlipidemia 5/12/2016    Morbid obesity ([facility]     Prediabetes 5/12/2016       Surgical History  Past Surgical History:   Procedure Laterality Date    HX BREAST BIOPSY Right 12/7/2018    RIGHT BREAST SKIN EXCISIONAL BIOPSY, performed by Paola Rosas MD at [facility] AMBULATORY OR    HX BREAST RECONSTRUCTION Bilateral 9/24/2015    BREAST RECONSTRUCTION performed by Zakiya Kim MD at [place] [address] HX BREAST RECONSTRUCTION Right 11/10/2015    REMOVAL RIGHT BREAST TISSUE EXPANDER performed by Zakiya Kim MD at [facility] MAIN OR    HX BREAST RECONSTRUCTION Right 2/19/2016    PLACEMENT RIGHT TISSUE EXPANDER  performed by Zakiya Kim MD at [facility] MAIN OR    HX BREAST RECONSTRUCTION Bilateral 10/21/2016    EXCHANGE BILATERAL TISSUE EXPANDER FOR BREAST IMPLANT performed by Zakiya Kim MD at [facility] MAIN OR    HX CHOLECYSTECTOMY      [address]    Vaginal deliveries    HX LAP CHOLECYSTECTOMY      HX MASTECTOMY Bilateral 9/24/2015    BILATERAL BREAST SKIN SPARING MASTECTOMY, RIGHT SENTINEL NODE BIOPSY/BILATERAL BREAST RECONSTRUCTION W/TISSUE EXPANDERS AND ALLODERM performed by Katherin Ortiz MD at [facility] AMBULATORY OR    HX VASCULAR ACCESS  12/2018    upper left chest    MS ERCP REMOVE CALCULI/DEBRIS BILIARY/PANCREAS DUCT  3/25/2016         MS ERCP W/SPHINCTEROTOMY/PAPILLOTOMY  3/25/2016             Medications  Current Outpatient Medications   Medication Sig Dispense Refill    amLODIPine (NORVASC) 10 mg tablet Take 1 Tab by mouth daily. 90 Tab 0    atorvastatin (LIPITOR) 10 mg tablet Take 1 Tab by mouth nightly. 90 Tab 0    levothyroxine (SYNTHROID) 25 mcg tablet Take 1 Tab by mouth Daily (before breakfast). 90 Tab 0    letrozole (FEMARA) 2.5 mg tablet Take 1 Tab by mouth daily. 30 Tab 6    lisinopril-hydroCHLOROthiazide (PRINZIDE, ZESTORETIC) 20-12.5 mg per tablet take 2 tablets by mouth once daily 180 Tab 3    omeprazole (PRILOSEC) 20 mg capsule Take 20 mg by mouth nightly. 90 Cap 1    diphenhydrAMINE (BENADRYL) 25 mg capsule Take 25 mg by mouth daily.  montelukast (SINGULAIR) 10 mg tablet Take 1 Tab by mouth daily.  Indications: inflammation of the nose due to an allergy 90 Tab 0    budesonide (RHINOCORT AQUA) 32 mcg/actuation nasal spray 2 Sprays by Both Nostrils route daily. 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   Social Needs    Financial resource strain: Not on file    Food insecurity:     Worry: Not on file     Inability: Not on file    Transportation needs:     Medical: Not on file     Non-medical: Not on file   Tobacco Use    Smoking status: Current Every Day Smoker     Packs/day: 2.00     Years: 30.00     Pack years: 60.00     Types: Cigarettes     Last attempt to quit: 2013     Years since quittin.8    Smokeless tobacco: Never Used   Substance and Sexual Activity    Alcohol use:  Yes     Alcohol/week: 7.0 standard drinks     Types: 7 Glasses of wine per week    Drug use: Never    Sexual activity: Not Currently     Partners: Male     Birth control/protection: None   Lifestyle    Physical activity:     Days per week: Not on file     Minutes per session: Not on file    Stress: Not on file   Relationships    Social connections:     Talks on phone: Not on file     Gets together: Not on file     Attends Shinto service: Not on file     Active member of club or organization: Not on file     Attends meetings of clubs or organizations: Not on file     Relationship status: Not on file    Intimate partner violence:     Fear of current or ex partner: Not on file     Emotionally abused: Not on file     Physically abused: Not on file     Forced sexual activity: Not on file   Other Topics Concern    Not on file   Social History Narrative    ** Merged History Encounter ** Review of Systems  Review of Systems - Review of all systems is negative except as noted above in the HPI. Vital Signs  Visit Vitals  /66 (BP 1 Location: Left arm, BP Patient Position: Sitting)   Pulse 86   Temp 98.7 °F (37.1 °C) (Oral)   Resp 20   Ht 5' 1\" (1.549 m)   Wt 244 lb (110.7 kg)   SpO2 97%   BMI 46.10 kg/m²         Physical Exam  Physical Examination: General appearance - oriented to person, place, and time, overweight, acyanotic, in no respiratory distress and well hydrated  Mental status - alert, oriented to person, place, and time, affect appropriate to mood  Nose - mucosal congestion  Mouth - mucous membranes moist, pharynx normal without lesions  Neck - supple, no significant adenopathy  Lymphatics - no palpable lymphadenopathy, no hepatosplenomegaly  Chest - no tachypnea, retractions or cyanosis  Heart - normal rate and regular rhythm, S1 and S2 normal  Abdomen - no rebound tenderness noted  Back exam - limited range of motion  Neurological - neck supple without rigidity, motor and sensory grossly normal bilaterally  Musculoskeletal - no muscular tenderness noted  Extremities - pedal edema 1 +, intact peripheral pulses    Results  Results for orders placed or performed in visit on 04/17/19   OCCULT BLOOD IMMUNOASSAY,DIAGNOSTIC   Result Value Ref Range    Occult blood fecal, by IA Negative Negative       ASSESSMENT and PLAN    ICD-10-CM ICD-9-CM    1. Pedal edema R60.0 782.3 furosemide (LASIX) 20 mg tablet      potassium chloride (KLOR-CON) 10 mEq tablet      COLLECTION VENOUS BLOOD,VENIPUNCTURE   2. Mixed hyperlipidemia E78.2 272.2 atorvastatin (LIPITOR) 10 mg tablet      LIPID PANEL      LIPID PANEL      COLLECTION VENOUS BLOOD,VENIPUNCTURE   3. Hypothyroidism, unspecified type E03.9 244.9 levothyroxine (SYNTHROID) 25 mcg tablet      TSH 3RD GENERATION      T4, FREE      T4, FREE      TSH 3RD GENERATION      COLLECTION VENOUS BLOOD,VENIPUNCTURE   4.  Gastroesophageal reflux disease, esophagitis presence not specified K21.9 530.81 omeprazole (PRILOSEC) 20 mg capsule      COLLECTION VENOUS BLOOD,VENIPUNCTURE   5. Essential hypertension I10 401.9 lisinopril-hydroCHLOROthiazide (PRINZIDE, ZESTORETIC) 20-12.5 mg per tablet      CBC WITH AUTOMATED DIFF      METABOLIC PANEL, COMPREHENSIVE      METABOLIC PANEL, COMPREHENSIVE      CBC WITH AUTOMATED DIFF      COLLECTION VENOUS BLOOD,VENIPUNCTURE   6. Malignant neoplasm of upper-inner quadrant of right breast in female, estrogen receptor positive (HCC) C50.211 174.2 letrozole (FEMARA) 2.5 mg tablet    Z17.0 V86.0 COLLECTION VENOUS BLOOD,VENIPUNCTURE   7. Dyslipidemia E78.5 272.4      lab results and schedule of future lab studies reviewed with patient  reviewed diet, exercise and weight control  cardiovascular risk and specific lipid/LDL goals reviewed  reviewed medications and side effects in detail      I have discussed the diagnosis with the patient and the intended plan of care as seen in the above orders. The patient has received an after-visit summary and questions were answered concerning future plans. I have discussed medication, side effects, and warnings with the patient in detail. The patient verbalized understanding and is in agreement with the plan of care. The patient will contact the office with any additional concerns. Minna Mackenzie MD    PLEASE NOTE:   This document has been produced using voice recognition software.  Unrecognized errors in transcription may be present

## 2019-11-01 LAB
ALBUMIN SERPL-MCNC: 4.4 G/DL (ref 3.5–5.5)
ALBUMIN/GLOB SERPL: 1.8 {RATIO} (ref 1.2–2.2)
ALP SERPL-CCNC: 83 IU/L (ref 39–117)
ALT SERPL-CCNC: 20 IU/L (ref 0–32)
AST SERPL-CCNC: 25 IU/L (ref 0–40)
BASOPHILS # BLD AUTO: 0.1 X10E3/UL (ref 0–0.2)
BASOPHILS NFR BLD AUTO: 1 %
BILIRUB SERPL-MCNC: 0.2 MG/DL (ref 0–1.2)
BUN SERPL-MCNC: 11 MG/DL (ref 6–24)
BUN/CREAT SERPL: 14 (ref 9–23)
CALCIUM SERPL-MCNC: 10.3 MG/DL (ref 8.7–10.2)
CHLORIDE SERPL-SCNC: 94 MMOL/L (ref 96–106)
CHOLEST SERPL-MCNC: 152 MG/DL (ref 100–199)
CO2 SERPL-SCNC: 24 MMOL/L (ref 20–29)
CREAT SERPL-MCNC: 0.76 MG/DL (ref 0.57–1)
EOSINOPHIL # BLD AUTO: 0.2 X10E3/UL (ref 0–0.4)
EOSINOPHIL NFR BLD AUTO: 1 %
ERYTHROCYTE [DISTWIDTH] IN BLOOD BY AUTOMATED COUNT: 13 % (ref 12.3–15.4)
GLOBULIN SER CALC-MCNC: 2.5 G/DL (ref 1.5–4.5)
GLUCOSE SERPL-MCNC: 90 MG/DL (ref 65–99)
HCT VFR BLD AUTO: 33.2 % (ref 34–46.6)
HDLC SERPL-MCNC: 32 MG/DL
HGB BLD-MCNC: 11.3 G/DL (ref 11.1–15.9)
IMM GRANULOCYTES # BLD AUTO: 0.1 X10E3/UL (ref 0–0.1)
IMM GRANULOCYTES NFR BLD AUTO: 1 %
INTERPRETATION, 910389: NORMAL
LDLC SERPL CALC-MCNC: 90 MG/DL (ref 0–99)
LYMPHOCYTES # BLD AUTO: 1.5 X10E3/UL (ref 0.7–3.1)
LYMPHOCYTES NFR BLD AUTO: 12 %
MCH RBC QN AUTO: 30.3 PG (ref 26.6–33)
MCHC RBC AUTO-ENTMCNC: 34 G/DL (ref 31.5–35.7)
MCV RBC AUTO: 89 FL (ref 79–97)
MONOCYTES # BLD AUTO: 0.7 X10E3/UL (ref 0.1–0.9)
MONOCYTES NFR BLD AUTO: 6 %
NEUTROPHILS # BLD AUTO: 9.7 X10E3/UL (ref 1.4–7)
NEUTROPHILS NFR BLD AUTO: 79 %
PLATELET # BLD AUTO: 676 X10E3/UL (ref 150–450)
POTASSIUM SERPL-SCNC: 3.9 MMOL/L (ref 3.5–5.2)
PROT SERPL-MCNC: 6.9 G/DL (ref 6–8.5)
RBC # BLD AUTO: 3.73 X10E6/UL (ref 3.77–5.28)
SODIUM SERPL-SCNC: 138 MMOL/L (ref 134–144)
T4 FREE SERPL-MCNC: 1.61 NG/DL (ref 0.82–1.77)
TRIGL SERPL-MCNC: 150 MG/DL (ref 0–149)
TSH SERPL DL<=0.005 MIU/L-ACNC: 2.04 UIU/ML (ref 0.45–4.5)
VLDLC SERPL CALC-MCNC: 30 MG/DL (ref 5–40)
WBC # BLD AUTO: 12.2 X10E3/UL (ref 3.4–10.8)

## 2019-11-03 PROBLEM — E78.5 DYSLIPIDEMIA: Status: ACTIVE | Noted: 2019-11-03

## 2019-11-04 NOTE — PROGRESS NOTES
Please let patient know her LDL cholesterol is 90 which is within normal.  Triglyceride is 150. Her white blood cell count and platelet count are elevated. This could be due to use of medication. They have been elevated in the past.  We will have her follow-up with her oncologist on this. She is taking Femara. Rest of her labs are stable.   Ирина Olivia MD

## 2019-11-04 NOTE — PROGRESS NOTES
Spoke with patient at this time. Patient verbalized understanding at this time. Patient states she need a new oncologist due to her just moving to the area . Please place referral.Patient states she will not be seeing her old oncologist

## 2020-01-20 DIAGNOSIS — R60.0 PEDAL EDEMA: ICD-10-CM

## 2020-01-22 RX ORDER — FUROSEMIDE 20 MG/1
TABLET ORAL
Qty: 14 TAB | Refills: 1 | Status: SHIPPED | OUTPATIENT
Start: 2020-01-22 | End: 2020-05-04

## 2020-01-22 RX ORDER — POTASSIUM CHLORIDE 750 MG/1
TABLET, FILM COATED, EXTENDED RELEASE ORAL
Qty: 30 TAB | Refills: 0 | Status: SHIPPED | OUTPATIENT
Start: 2020-01-22 | End: 2020-05-04

## 2020-02-13 NOTE — PROGRESS NOTES
Pt here for visit. Labs drawn. Venipuncture done in hand. Blood specimen obtained. Pt tolerated well. No comps. Reason for Disposition   SEVERE diarrhea (e.g., 7 or more times / day more than normal) and present > 24 hours (1 day)    Protocols used: DIARRHEA-ADULT-[place]    Patient called [facility]) to schedule appointment, with red flag complaint, transferred to RN access for triage. Patient reports starting with diarrhea yesterday, reports > 20 episodes of diarrhea today, reports mild intermittent generalized abdominal pain, denies dizziness, denies constant abdominal pain, denies change in the color of stool, denies confusion, denies being too weak to stand. , denies fever. Patient reports she has been able to urinate in the past 12 hours, reports she is drinking Powerade constantly. Patient reports her mother recently had Cdiff and the patient is concerned. Writer instructed patient to be seen in the office today and to call back with any new or worsening symptoms. Writer provided warm transfer to [facility] for appointment scheduling. Please do not respond to the triage nurse through this encounter. Any subsequent communication should be directly with the patient.

## 2020-05-04 ENCOUNTER — VIRTUAL VISIT (OUTPATIENT)
Dept: FAMILY MEDICINE CLINIC | Age: 60
End: 2020-05-04

## 2020-05-04 DIAGNOSIS — E66.01 MORBID OBESITY DUE TO EXCESS CALORIES (HCC): Primary | ICD-10-CM

## 2020-05-04 DIAGNOSIS — J32.9 CHRONIC SINUSITIS, UNSPECIFIED LOCATION: ICD-10-CM

## 2020-05-04 DIAGNOSIS — I10 ESSENTIAL HYPERTENSION: ICD-10-CM

## 2020-05-04 DIAGNOSIS — C50.211 MALIGNANT NEOPLASM OF UPPER-INNER QUADRANT OF RIGHT BREAST IN FEMALE, ESTROGEN RECEPTOR POSITIVE (HCC): ICD-10-CM

## 2020-05-04 DIAGNOSIS — E03.9 HYPOTHYROIDISM, UNSPECIFIED TYPE: ICD-10-CM

## 2020-05-04 DIAGNOSIS — R60.0 PEDAL EDEMA: ICD-10-CM

## 2020-05-04 DIAGNOSIS — E78.2 MIXED HYPERLIPIDEMIA: ICD-10-CM

## 2020-05-04 DIAGNOSIS — Z17.0 MALIGNANT NEOPLASM OF UPPER-INNER QUADRANT OF RIGHT BREAST IN FEMALE, ESTROGEN RECEPTOR POSITIVE (HCC): ICD-10-CM

## 2020-05-04 DIAGNOSIS — R21 RASH AND NONSPECIFIC SKIN ERUPTION: ICD-10-CM

## 2020-05-04 NOTE — PROGRESS NOTES
Braxton Oppenheim Doe is a 64 y.o. female who was seen by synchronous (real-time) audio-video technology on 5/4/2020. Consent: Crow Wang, who was seen by synchronous (real-time) audio-video technology, and/or her healthcare decision maker, is aware that this patient-initiated, Telehealth encounter on 5/4/2020 is a billable service, with coverage as determined by her insurance carrier. She is aware that she may receive a bill and has provided verbal consent to proceed: Yes. Assessment & Plan:       ICD-10-CM ICD-9-CM    1. Morbid obesity due to excess calories (HCC) E66.01 278.01    2. Essential hypertension I10 401.9    3. Hypothyroidism, unspecified type E03.9 244.9    4. Mixed hyperlipidemia E78.2 272.2    5. Pedal edema R60.0 782.3    6. Malignant neoplasm of upper-inner quadrant of right breast in female, estrogen receptor [facility] C50.211 174.2     Z17.0 V86.0    7. Chronic sinusitis, unspecified location J32.9 473.9    8. Rash and nonspecific skin eruption R21 782.1      Subjective:   Braxton Oppenheim Doe is a 64 y.o. female who was seen for Weight Management; Hypertension; Cholesterol Problem; and Peripheral Edema    Patient is seen for follow-up visit. Patient has morbid obesity. Previous weight was 244 pounds. She has lost more than 30 pounds. Her current weight is 211. She is doing the keto diet. I congratulated her on her weight loss and gave encouragement. She will continue with lifestyle and dietary modification. Patient has hypertension. She is on Prinzide and atenolol. She denies headache, changes in vision or focal weakness. We will recheck labs at next visit. In the meantime we will continue with the current treatment plan. Patient has dyslipidemia and is on atorvastatin 10 mg daily. We will recheck her lipid panel at next visit. She has hypothyroidism. She would like to get her TSH checked. We will recheck this at next visit and I will place a request for the same.   In the meantime she will continue with her levothyroxine. Patient has GERD and has been on Prilosec. She denies heartburn, nausea, vomiting or dark stools. Continue current treatment plan. She has a history of breast cancer and is on Femara daily. She will continue with this medication. Patient has psoriatic rash that has developed all over the body. He has psoriatic patches on her forearms, elbows, face, neck and trunk. Would like medication for this. I will send in clobetasol to apply topically. I will follow-up in case of no improvement. Patient has a history of pedal edema. This has since resolved. She has longer takes the furosemide or potassium supplementation. Resolution came after she has lost weight patient states. She was also on diuretic medication. Patient has a history of chronic sinusitis. She is on Flonase nasal spray. She can continue with this medication as needed. Prior to Admission medications    Medication Sig Start Date End Date Taking? Authorizing Provider   potassium chloride SR (KLOR-CON 10) 10 mEq tablet take 1 tablet by mouth once daily 1/22/20 5/4/20  Milan Cody MD   furosemide (LASIX) 20 mg tablet take 1 tablet by mouth once daily 1/22/20 5/4/20  Milan Cody MD   atorvastatin (LIPITOR) 10 mg tablet Take 1 Tab by mouth nightly. 10/31/19   Milan Cody MD   levothyroxine (SYNTHROID) 25 mcg tablet Take 1 Tab by mouth Daily (before breakfast). 10/31/19   Milan Cody MD   omeprazole (PRILOSEC) 20 mg capsule Take 20 mg by mouth nightly. 10/31/19   Milan Cody MD   lisinopril-hydroCHLOROthiazide (PRINZIDE, ZESTORETIC) 20-12.5 mg per tablet take 2 tablets by mouth once daily 10/31/19   Milan Cody MD   letrozole (FEMARA) 2.5 mg tablet Take 1 Tab by mouth daily. 10/31/19   Milan Cody MD   atenolol (TENORMIN) 25 mg tablet Take 1 Tab by mouth daily.  10/31/19   Milan Cody MD   montelukast (SINGULAIR) 10 mg tablet Take 1 Tab by mouth daily. Indications: inflammation of the nose due to an allergy 7/11/19 5/4/20  Andre Sanz MD   budesonide (RHINOCORT AQUA) 32 mcg/actuation nasal spray 2 Sprays by Both Nostrils route daily. 5/20/19   Andre Sanz MD   diphenhydrAMINE (BENADRYL) 25 mg capsule Take 25 mg by mouth daily. 5/4/20  Provider, Historical     No Known Allergies    ROS Review of all systems is negative except as noted above in the HPI. Objective:   Vital Signs: (As obtained by patient/caregiver at home)  There were no vitals taken for this visit. Constitutional: [x] Appears well-developed and well-nourished [x] No apparent distress   Mental status: [x] Alert and awake  [x] Oriented to person/place/time [x] Able to follow commands      Pulmonary/Chest: [x] Respiratory effort normal   [x] No visualized signs of difficulty breathing or respiratory distress        Neurological:        [x] No Facial Asymmetry (Cranial nerve 7 motor function) (limited exam due to video visit)          [x] No gaze palsy                 Skin:        [x] Abnormal -multiple psoriatic plaques noted on the face, elbows, forearms and neck. Psychiatric:       [x] Normal Affect    We discussed the expected course, resolution and complications of the diagnosis(es) in detail. Medication risks, benefits, costs, interactions, and alternatives were discussed as indicated. I advised her to contact the office if her condition worsens, changes or fails to improve as anticipated. She expressed understanding with the diagnosis(es) and plan. Gilberto Reilly is a 64 y.o. female who was evaluated by a video visit encounter for concerns as above. Patient identification was verified prior to start of the visit. A caregiver was present when appropriate.  Due to this being a TeleHealth encounter (During [facility] emergency), evaluation of the following organ systems was limited: Vitals/Constitutional/EENT/Resp/CV/GI//MS/Neuro/Skin/Heme-Lymph-Imm. Pursuant to the emergency declaration under the [address] [place] [address], [facility] waiver authority and the [employer] Act, this Virtual  Visit was conducted, with patient's (and/or legal guardian's) consent, to reduce the patient's risk of exposure to COVID-19 and provide necessary medical care. Services were provided through a video synchronous discussion virtually to substitute for in-person clinic visit. Patient and provider were located at their individual homes.       Pamla Kawasaki, MD

## 2020-05-06 ENCOUNTER — TELEPHONE (OUTPATIENT)
Dept: FAMILY MEDICINE CLINIC | Age: 60
End: 2020-05-06

## 2020-05-06 DIAGNOSIS — R21 RASH: Primary | ICD-10-CM

## 2020-05-06 RX ORDER — CLOBETASOL PROPIONATE 0.5 MG/G
CREAM TOPICAL 2 TIMES DAILY
Qty: 60 G | Refills: 0 | Status: SHIPPED | OUTPATIENT
Start: 2020-05-06 | End: 2020-08-05 | Stop reason: SDUPTHER

## 2020-05-06 NOTE — TELEPHONE ENCOUNTER
Patient called stating she had a virtual visit 5/4/20. She was told at that time that medication would be sent in for psoriasis. She is requesting this medication.

## 2020-08-05 ENCOUNTER — OFFICE VISIT (OUTPATIENT)
Dept: FAMILY MEDICINE CLINIC | Age: 60
End: 2020-08-05

## 2020-08-05 VITALS
HEART RATE: 69 BPM | TEMPERATURE: 97.5 F | OXYGEN SATURATION: 96 % | DIASTOLIC BLOOD PRESSURE: 103 MMHG | RESPIRATION RATE: 18 BRPM | SYSTOLIC BLOOD PRESSURE: 209 MMHG | WEIGHT: 203 LBS | BODY MASS INDEX: 38.33 KG/M2 | HEIGHT: 61 IN

## 2020-08-05 DIAGNOSIS — Z17.0 MALIGNANT NEOPLASM OF UPPER-INNER QUADRANT OF RIGHT BREAST IN FEMALE, ESTROGEN RECEPTOR POSITIVE (HCC): ICD-10-CM

## 2020-08-05 DIAGNOSIS — Z12.11 SCREEN FOR COLON CANCER: ICD-10-CM

## 2020-08-05 DIAGNOSIS — R21 RASH: ICD-10-CM

## 2020-08-05 DIAGNOSIS — N63.0 BREAST LUMP: ICD-10-CM

## 2020-08-05 DIAGNOSIS — R73.9 HYPERGLYCEMIA: ICD-10-CM

## 2020-08-05 DIAGNOSIS — I10 ESSENTIAL HYPERTENSION: Primary | ICD-10-CM

## 2020-08-05 DIAGNOSIS — E07.9 THYROID DISORDER: ICD-10-CM

## 2020-08-05 DIAGNOSIS — C50.211 MALIGNANT NEOPLASM OF UPPER-INNER QUADRANT OF RIGHT BREAST IN FEMALE, ESTROGEN RECEPTOR POSITIVE (HCC): ICD-10-CM

## 2020-08-05 DIAGNOSIS — K21.9 GASTROESOPHAGEAL REFLUX DISEASE, ESOPHAGITIS PRESENCE NOT SPECIFIED: ICD-10-CM

## 2020-08-05 DIAGNOSIS — E78.5 DYSLIPIDEMIA: ICD-10-CM

## 2020-08-05 RX ORDER — CLOBETASOL PROPIONATE 0.5 MG/G
CREAM TOPICAL 2 TIMES DAILY
Qty: 60 G | Refills: 0 | Status: SHIPPED | OUTPATIENT
Start: 2020-08-05 | End: 2020-08-06

## 2020-08-05 RX ORDER — OMEPRAZOLE 20 MG/1
CAPSULE, DELAYED RELEASE ORAL
Qty: 90 CAP | Refills: 3 | Status: SHIPPED | OUTPATIENT
Start: 2020-08-05 | End: 2020-08-06 | Stop reason: SDUPTHER

## 2020-08-05 RX ORDER — ATENOLOL 25 MG/1
25 TABLET ORAL DAILY
Qty: 90 TAB | Refills: 3 | Status: CANCELLED | OUTPATIENT
Start: 2020-08-05

## 2020-08-05 RX ORDER — AMLODIPINE BESYLATE 10 MG/1
10 TABLET ORAL DAILY
Qty: 90 TAB | Refills: 1 | Status: SHIPPED | OUTPATIENT
Start: 2020-08-05 | End: 2020-08-06 | Stop reason: SDUPTHER

## 2020-08-05 RX ORDER — LETROZOLE 2.5 MG/1
2.5 TABLET, FILM COATED ORAL DAILY
Qty: 90 TAB | Refills: 3 | Status: SHIPPED | OUTPATIENT
Start: 2020-08-05 | End: 2020-08-06 | Stop reason: SDUPTHER

## 2020-08-05 RX ORDER — LISINOPRIL AND HYDROCHLOROTHIAZIDE 12.5; 2 MG/1; MG/1
TABLET ORAL
Qty: 180 TAB | Refills: 3 | Status: SHIPPED | OUTPATIENT
Start: 2020-08-05 | End: 2020-08-06 | Stop reason: SDUPTHER

## 2020-08-05 NOTE — PROGRESS NOTES
[facility]  Roosevelt Nichols comes in for f/u care. HTN: Patient has hypertension. She is on Prinzide 20/12.52 tablets daily. She has been taking atenolol 25 mg daily. Previously she was on amlodipine and her blood pressure was stable. However she got pedal edema and she wanted to try different medication. Blood pressure has been elevated. She denies headache, changes in vision or focal weakness. Today her blood pressure is 521 systolic. I will discontinue the atenolol. I will get her back on the amlodipine 10 mg daily. She will continue with the Prinzide. She will keep a blood pressure log and I will follow-up at next visit. Breast lump: Patient has a history of breast cancer and has had bilateral mastectomy. She underwent a screening mammogram in July 2015. Abnormality was noted in the right breast. 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 She elected to undergo b/l mastectomy and had it done on 09/25/2015. She suffered necrosis of skin flaps and had to undergo redo surgeries. After several rounds of salvage surgeries, she completely healed. She took AI for a few months and then stopped on her own and also did not follow up in the clinic. She suffered a recurrence on the right chest wall over the reconstructed breast which was excised by Dr. Pieter Andres on 10/19/2018. She underwent re-excision of the skin around the previous surgical margin. She also underwent adjuvant chemotherapy. She has noted breast lump on the breast tissue right breast area. This is not painful, discharging or getting bigger. She requests referral to the breast clinic. Referral will be made. Is a question of recurrence of the breast cancer. Skin rash: Patient has rash palms of both hands. She has a history of psoriasis. Has used clobetasol in the past with good result. She is out of the medication. I will give prescription for clobetasol. She would like to try this.   If this does not improve we will have her see the dermatologist.  Thyroid disorder: Patient is on levothyroxine. Takes Synthroid 25 mcg daily. Patient would like to discontinue taking the levothyroxine. She states that she was started on the levothyroxine to help with weight loss. I will recheck her TSH levels. Dyslipidemia: Patient has dyslipidemia. She is on atorvastatin. Would like to discontinue the medication. She has been doing lifestyle and dietary modification. She will continue to exercise and take a diet low in polysaturated fats. We will recheck a lipid panel. Obesity: Patient has a BMI of 38.36. She has lost about 40 pounds. Previously she was morbidly obese but she is doing lifestyle and dietary modification. I did congratulate her on this. She would like to get off medication. GERD: Patient has gastroesophageal reflux disease. She takes Prilosec. We will continue with this medication.       Past Medical History  Past Medical History:   Diagnosis Date    Acid reflux     Adverse effect of anesthesia     sister has a lot of allergies and issues with anesthesia per pt    BPPV (benign paroxysmal positional vertigo)     Breast cancer ([address] 7/2014    right    Environmental allergies     Gallstones     Negative workup in 2009    GERD (gastroesophageal reflux disease)     HTN (hypertension) 2/24/2015    Hypertension 9/19/2013    Hypertension     Hypoactive thyroid     Hypothyroidism 2/24/2015    Mixed hyperlipidemia 5/12/2016    Morbid obesity ([address]     Prediabetes 5/12/2016       Surgical History  Past Surgical History:   Procedure Laterality Date    HX BREAST BIOPSY Right 12/7/2018    RIGHT BREAST SKIN EXCISIONAL BIOPSY, performed by Deanie Harada, MD at [address] HX BREAST RECONSTRUCTION Bilateral 9/24/2015    BREAST RECONSTRUCTION performed by Jerry Gregory MD at [address] HX BREAST RECONSTRUCTION Right 11/10/2015    REMOVAL RIGHT BREAST TISSUE EXPANDER performed by Octavio Colin MD at [facility] MAIN OR    HX BREAST RECONSTRUCTION Right 2/19/2016    PLACEMENT RIGHT TISSUE EXPANDER  performed by Octavio Colin MD at [facility] MAIN OR    HX BREAST RECONSTRUCTION Bilateral 10/21/2016    EXCHANGE BILATERAL TISSUE EXPANDER FOR BREAST IMPLANT performed by Octavio Colin MD at [facility] MAIN OR    HX CHOLECYSTECTOMY      [address]    Vaginal deliveries    HX LAP CHOLECYSTECTOMY      HX MASTECTOMY Bilateral 9/24/2015    BILATERAL BREAST SKIN SPARING MASTECTOMY, RIGHT SENTINEL NODE BIOPSY/BILATERAL BREAST RECONSTRUCTION W/TISSUE EXPANDERS AND ALLODERM performed by Franklin Marcelo MD at [facility] AMBULATORY OR    HX VASCULAR ACCESS  12/2018    upper left chest    DC ERCP REMOVE CALCULI/DEBRIS BILIARY/PANCREAS DUCT  3/25/2016         DC ERCP W/SPHINCTEROTOMY/PAPILLOTOMY  3/25/2016             Medications  Current Outpatient Medications   Medication Sig Dispense Refill    clobetasoL (TEMOVATE) 0.05 % topical cream Apply  to affected area two (2) times a day. 60 g 0    omeprazole (PRILOSEC) 20 mg capsule Take 20 mg by mouth nightly. 90 Cap 3    lisinopril-hydroCHLOROthiazide (PRINZIDE, ZESTORETIC) 20-12.5 mg per tablet take 2 tablets by mouth once daily 180 Tab 3    letrozole (FEMARA) 2.5 mg tablet Take 1 Tab by mouth daily. 90 Tab 3    amLODIPine (NORVASC) 10 mg tablet Take 1 Tab by mouth daily. 90 Tab 1    budesonide (RHINOCORT AQUA) 32 mcg/actuation nasal spray 2 Sprays by Both Nostrils route daily. 1 Bottle 0    atorvastatin (LIPITOR) 10 mg tablet Take 1 Tab by mouth nightly. 90 Tab 3    levothyroxine (SYNTHROID) 25 mcg tablet Take 1 Tab by mouth Daily (before breakfast).  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   Social Needs    Financial resource strain: Not on file    Food insecurity     Worry: Not on file     Inability: Not on file   [employer] needs     Medical: Not on file     Non-medical: Not on file   Tobacco Use    Smoking status: Current Every Day Smoker     Packs/day: 2.00     Years: 30.00     Pack years: 60.00     Types: Cigarettes     Last attempt to quit: 2013     Years since quittin.5    Smokeless tobacco: Never Used   Substance and Sexual Activity    Alcohol use: Yes     Alcohol/week: 7.0 standard drinks     Types: 7 Glasses of wine per week    Drug use: Never    Sexual activity: Not Currently     Partners: Male     Birth control/protection: None   Lifestyle    Physical activity     Days per week: Not on file     Minutes per session: Not on file    Stress: Not on file   Relationships    Social connections     Talks on phone: Not on file     Gets together: Not on file     Attends Anabaptism service: Not on file     Active member of club or organization: Not on file     Attends meetings of clubs or organizations: Not on file     Relationship status: Not on file    Intimate partner violence     Fear of current or ex partner: Not on file     Emotionally abused: Not on file     Physically abused: Not on file     Forced sexual activity: Not on file   Other Topics Concern    Not on file   Social History Narrative    ** Merged History Encounter **            Review of Systems  Review of Systems - Review of all systems is negative except as noted above in the HPI.     Vital Signs  Visit Vitals  BP (!) 209/103 (BP 1 Location: Right arm, BP Patient Position: Sitting)   Pulse 69   Temp 97.5 °F (36.4 °C) (Oral)   Resp 18   Ht 5' 1\" (1.549 m)   Wt 203 lb (92.1 kg)   SpO2 96%   BMI 38.36 kg/m² Physical Exam  Physical Examination: General appearance - alert, well appearing, and in no distress, oriented to person, place, and time, overweight, acyanotic, in no respiratory distress and well hydrated  Mental status - alert, oriented to person, place, and time, affect appropriate to mood  Neck - supple, no significant adenopathy  Lymphatics - no palpable lymphadenopathy  Chest - clear to auscultation, no wheezes, rales or rhonchi, symmetric air entry  Heart - normal rate and regular rhythm, S1 and S2 normal  Abdomen - no rebound tenderness noted  Breasts -lump right breast medial margin that measures about 2 x 2 cm. Neurological - motor and sensory grossly normal bilaterally  Musculoskeletal - no muscular tenderness noted  Extremities - no pedal edema noted  Skin - DERMATITIS NOTED: eczematoid dermatitis on palms of both hands      Results  Results for orders placed or performed in visit on 65/95/87   METABOLIC PANEL, COMPREHENSIVE   Result Value Ref Range    Glucose 90 65 - 99 mg/dL    BUN 11 6 - 24 mg/dL    Creatinine 0.76 0.57 - 1.00 mg/dL    GFR est non-AA 87 >59 mL/min/1.73    GFR est  >59 mL/min/1.73    BUN/Creatinine ratio 14 9 - 23    Sodium 138 134 - 144 mmol/L    Potassium 3.9 3.5 - 5.2 mmol/L    Chloride 94 (L) 96 - 106 mmol/L    CO2 24 20 - 29 mmol/L    Calcium 10.3 (H) 8.7 - 10.2 mg/dL    Protein, total 6.9 6.0 - 8.5 g/dL    Albumin 4.4 3.5 - 5.5 g/dL    GLOBULIN, TOTAL 2.5 1.5 - 4.5 g/dL    A-G Ratio 1.8 1.2 - 2.2    Bilirubin, total 0.2 0.0 - 1.2 mg/dL    Alk.  phosphatase 83 39 - 117 IU/L    AST (SGOT) 25 0 - 40 IU/L    ALT (SGPT) 20 0 - 32 IU/L   T4, FREE   Result Value Ref Range    T4, Free 1.61 0.82 - 1.77 ng/dL   TSH 3RD GENERATION   Result Value Ref Range    TSH 2.040 0.450 - 4.500 uIU/mL   LIPID PANEL   Result Value Ref Range    Cholesterol, total 152 100 - 199 mg/dL    Triglyceride 150 (H) 0 - 149 mg/dL    HDL Cholesterol 32 (L) >39 mg/dL    VLDL, calculated 30 5 - 40 mg/dL LDL, calculated 90 0 - 99 mg/dL   CBC WITH AUTOMATED DIFF   Result Value Ref Range    WBC 12.2 (H) 3.4 - 10.8 x10E3/uL    RBC 3.73 (L) 3.77 - 5.28 x10E6/uL    HGB 11.3 11.1 - 15.9 g/dL    HCT 33.2 (L) 34.0 - 46.6 %    MCV 89 79 - 97 fL    MCH 30.3 26.6 - 33.0 pg    MCHC 34.0 31.5 - 35.7 g/dL    RDW 13.0 12.3 - 15.4 %    PLATELET 012 (H) 136 - 450 x10E3/uL    NEUTROPHILS 79 Not Estab. %    Lymphocytes 12 Not Estab. %    MONOCYTES 6 Not Estab. %    EOSINOPHILS 1 Not Estab. %    BASOPHILS 1 Not Estab. %    ABS. NEUTROPHILS 9.7 (H) 1.4 - 7.0 x10E3/uL    Abs Lymphocytes 1.5 0.7 - 3.1 x10E3/uL    ABS. MONOCYTES 0.7 0.1 - 0.9 x10E3/uL    ABS. EOSINOPHILS 0.2 0.0 - 0.4 x10E3/uL    ABS. BASOPHILS 0.1 0.0 - 0.2 x10E3/uL    IMMATURE GRANULOCYTES 1 Not Estab. %    ABS. IMM. GRANS. 0.1 0.0 - 0.1 x10E3/uL   CVD REPORT   Result Value Ref Range    INTERPRETATION Note        ASSESSMENT and PLAN    ICD-10-CM ICD-9-CM    1. Essential hypertension  I10 401.9 lisinopril-hydroCHLOROthiazide (PRINZIDE, ZESTORETIC) 20-12.5 mg per tablet      amLODIPine (NORVASC) 10 mg tablet      CBC WITH AUTOMATED DIFF      METABOLIC PANEL, COMPREHENSIVE      METABOLIC PANEL, COMPREHENSIVE      CBC WITH AUTOMATED DIFF   2. Rash  R21 782.1 clobetasoL (TEMOVATE) 0.05 % topical cream   3. Gastroesophageal reflux disease, esophagitis presence not specified  K21.9 530.81 omeprazole (PRILOSEC) 20 mg capsule   4. Malignant neoplasm of upper-inner quadrant of right breast in female, estrogen receptor positive (HCC)  C50.211 174.2 letrozole (FEMARA) 2.5 mg tablet    Z17.0 V86.0 COLLECTION VENOUS BLOOD,VENIPUNCTURE   5. Dyslipidemia  E78.5 272.4 LIPID PANEL      LIPID PANEL      COLLECTION VENOUS BLOOD,VENIPUNCTURE   6. Thyroid disorder  E07.9 246.9 TSH 3RD GENERATION      TSH 3RD GENERATION      COLLECTION VENOUS BLOOD,VENIPUNCTURE   7.  Breast lump  N63.0 611.72 REFERRAL TO BREAST SURGERY   8. Screen for colon cancer  Z12.11 V76.51 OCCULT BLOOD IMMUNOASSAY,DIAGNOSTIC   9. Hyperglycemia  R73.9 790.29 HEMOGLOBIN A1C WITH EAG      HEMOGLOBIN A1C WITH EAG      COLLECTION VENOUS BLOOD,VENIPUNCTURE     lab results and schedule of future lab studies reviewed with patient  reviewed diet, exercise and weight control  cardiovascular risk and specific lipid/LDL goals reviewed  reviewed medications and side effects in detail      I have discussed the diagnosis with the patient and the intended plan of care as seen in the above orders. The patient has received an after-visit summary and questions were answered concerning future plans. I have discussed medication, side effects, and warnings with the patient in detail. The patient verbalized understanding and is in agreement with the plan of care. The patient will contact the office with any additional concerns. I spent at least 50 minutes on this visit with this established patient. Ирина Olivia MD    PLEASE NOTE:   This document has been produced using voice recognition software.  Unrecognized errors in transcription may be present

## 2020-08-06 ENCOUNTER — TELEPHONE (OUTPATIENT)
Dept: FAMILY MEDICINE CLINIC | Age: 60
End: 2020-08-06

## 2020-08-06 DIAGNOSIS — K21.9 GASTROESOPHAGEAL REFLUX DISEASE, ESOPHAGITIS PRESENCE NOT SPECIFIED: ICD-10-CM

## 2020-08-06 DIAGNOSIS — R21 RASH: ICD-10-CM

## 2020-08-06 DIAGNOSIS — Z17.0 MALIGNANT NEOPLASM OF UPPER-INNER QUADRANT OF RIGHT BREAST IN FEMALE, ESTROGEN RECEPTOR POSITIVE (HCC): ICD-10-CM

## 2020-08-06 DIAGNOSIS — C50.211 MALIGNANT NEOPLASM OF UPPER-INNER QUADRANT OF RIGHT BREAST IN FEMALE, ESTROGEN RECEPTOR POSITIVE (HCC): ICD-10-CM

## 2020-08-06 DIAGNOSIS — I10 ESSENTIAL HYPERTENSION: ICD-10-CM

## 2020-08-06 LAB
ALBUMIN SERPL-MCNC: 4.8 G/DL (ref 3.8–4.9)
ALBUMIN/GLOB SERPL: 2.7 {RATIO} (ref 1.2–2.2)
ALP SERPL-CCNC: 82 IU/L (ref 39–117)
ALT SERPL-CCNC: 17 IU/L (ref 0–32)
AST SERPL-CCNC: 17 IU/L (ref 0–40)
BASOPHILS # BLD AUTO: 0 X10E3/UL (ref 0–0.2)
BASOPHILS NFR BLD AUTO: 0 %
BILIRUB SERPL-MCNC: 0.7 MG/DL (ref 0–1.2)
BUN SERPL-MCNC: 14 MG/DL (ref 6–24)
BUN/CREAT SERPL: 19 (ref 9–23)
CALCIUM SERPL-MCNC: 10.3 MG/DL (ref 8.7–10.2)
CHLORIDE SERPL-SCNC: 101 MMOL/L (ref 96–106)
CHOLEST SERPL-MCNC: 146 MG/DL (ref 100–199)
CO2 SERPL-SCNC: 23 MMOL/L (ref 20–29)
CREAT SERPL-MCNC: 0.74 MG/DL (ref 0.57–1)
EOSINOPHIL # BLD AUTO: 0.2 X10E3/UL (ref 0–0.4)
EOSINOPHIL NFR BLD AUTO: 2 %
ERYTHROCYTE [DISTWIDTH] IN BLOOD BY AUTOMATED COUNT: 13.4 % (ref 11.7–15.4)
EST. AVERAGE GLUCOSE BLD GHB EST-MCNC: 117 MG/DL
GLOBULIN SER CALC-MCNC: 1.8 G/DL (ref 1.5–4.5)
GLUCOSE SERPL-MCNC: 93 MG/DL (ref 65–99)
HBA1C MFR BLD: 5.7 % (ref 4.8–5.6)
HCT VFR BLD AUTO: 45.7 % (ref 34–46.6)
HDLC SERPL-MCNC: 40 MG/DL
HGB BLD-MCNC: 15.6 G/DL (ref 11.1–15.9)
IMM GRANULOCYTES # BLD AUTO: 0 X10E3/UL (ref 0–0.1)
IMM GRANULOCYTES NFR BLD AUTO: 0 %
INTERPRETATION, 910389: NORMAL
LDLC SERPL CALC-MCNC: 82 MG/DL (ref 0–99)
LYMPHOCYTES # BLD AUTO: 1.6 X10E3/UL (ref 0.7–3.1)
LYMPHOCYTES NFR BLD AUTO: 22 %
MCH RBC QN AUTO: 34.7 PG (ref 26.6–33)
MCHC RBC AUTO-ENTMCNC: 34.1 G/DL (ref 31.5–35.7)
MCV RBC AUTO: 102 FL (ref 79–97)
MONOCYTES # BLD AUTO: 0.6 X10E3/UL (ref 0.1–0.9)
MONOCYTES NFR BLD AUTO: 7 %
NEUTROPHILS # BLD AUTO: 5.1 X10E3/UL (ref 1.4–7)
NEUTROPHILS NFR BLD AUTO: 69 %
PLATELET # BLD AUTO: 288 X10E3/UL (ref 150–450)
POTASSIUM SERPL-SCNC: 3.8 MMOL/L (ref 3.5–5.2)
PROT SERPL-MCNC: 6.6 G/DL (ref 6–8.5)
RBC # BLD AUTO: 4.5 X10E6/UL (ref 3.77–5.28)
SODIUM SERPL-SCNC: 143 MMOL/L (ref 134–144)
TRIGL SERPL-MCNC: 118 MG/DL (ref 0–149)
TSH SERPL DL<=0.005 MIU/L-ACNC: 4.31 UIU/ML (ref 0.45–4.5)
VLDLC SERPL CALC-MCNC: 24 MG/DL (ref 5–40)
WBC # BLD AUTO: 7.5 X10E3/UL (ref 3.4–10.8)

## 2020-08-06 RX ORDER — AMLODIPINE BESYLATE 10 MG/1
10 TABLET ORAL DAILY
Qty: 90 TAB | Refills: 1 | Status: SHIPPED | OUTPATIENT
Start: 2020-08-06 | End: 2021-01-21

## 2020-08-06 RX ORDER — LISINOPRIL AND HYDROCHLOROTHIAZIDE 12.5; 2 MG/1; MG/1
TABLET ORAL
Qty: 180 TAB | Refills: 3 | Status: SHIPPED | OUTPATIENT
Start: 2020-08-06 | End: 2021-10-28

## 2020-08-06 RX ORDER — OMEPRAZOLE 20 MG/1
CAPSULE, DELAYED RELEASE ORAL
Qty: 90 CAP | Refills: 3 | Status: SHIPPED | OUTPATIENT
Start: 2020-08-06 | End: 2021-10-25

## 2020-08-06 RX ORDER — CLOBETASOL PROPIONATE 0.5 MG/G
CREAM TOPICAL
Qty: 60 G | Refills: 0 | Status: SHIPPED | OUTPATIENT
Start: 2020-08-06 | End: 2021-12-08

## 2020-08-06 RX ORDER — LETROZOLE 2.5 MG/1
2.5 TABLET, FILM COATED ORAL DAILY
Qty: 90 TAB | Refills: 3 | Status: SHIPPED | OUTPATIENT
Start: 2020-08-06 | End: 2021-10-25

## 2020-08-06 NOTE — TELEPHONE ENCOUNTER
Patient called to inform Dr Matt Ocasio that [facility] power was out so she needs for him to resend all medications that he sent to the pharmacy when available.  Please contact for any questions

## 2020-08-13 NOTE — PROGRESS NOTES
Please let patient know her HbA1c is 5.7. This is improved from the previous 5.8. She is still in the prediabetes range and she should intensify lifestyle and dietary modification. Rest of her lab results are reassuring.   Bayron Mcdonough MD

## 2020-08-21 NOTE — PROGRESS NOTES
Called patient at this time. Informed patient of results. Patient verbalized understanding at this time. No other concerns noted

## 2020-09-02 ENCOUNTER — CLINICAL SUPPORT (OUTPATIENT)
Dept: FAMILY MEDICINE CLINIC | Age: 60
End: 2020-09-02

## 2020-09-02 VITALS
TEMPERATURE: 97.4 F | SYSTOLIC BLOOD PRESSURE: 136 MMHG | OXYGEN SATURATION: 95 % | BODY MASS INDEX: 38.36 KG/M2 | DIASTOLIC BLOOD PRESSURE: 76 MMHG | RESPIRATION RATE: 18 BRPM | HEART RATE: 82 BPM | HEIGHT: 61 IN

## 2020-09-02 DIAGNOSIS — I10 ESSENTIAL HYPERTENSION: Primary | ICD-10-CM

## 2020-09-02 NOTE — PROGRESS NOTES
Chief Complaint   Patient presents with    Blood Pressure Check     1. Have you been to the ER, urgent care clinic since your last visit? Hospitalized since your last visit? No    2. Have you seen or consulted any other health care providers outside of the [address] [place] [address] since your last visit? Include any pap smears or colon screening.  No     Visit Vitals  /76 (BP 1 Location: Left arm, BP Patient Position: Sitting)   Pulse 82   Temp 97.4 °F (36.3 °C) (Oral)   Resp 18   Ht 5' 1\" (1.549 m)   SpO2 95%   BMI 38.36 kg/m²

## 2020-10-14 ENCOUNTER — OFFICE VISIT (OUTPATIENT)
Dept: SURGERY | Age: 60
End: 2020-10-14
Payer: COMMERCIAL

## 2020-10-14 VITALS
HEART RATE: 94 BPM | HEIGHT: 61 IN | TEMPERATURE: 98.7 F | DIASTOLIC BLOOD PRESSURE: 86 MMHG | BODY MASS INDEX: 36.63 KG/M2 | WEIGHT: 194 LBS | OXYGEN SATURATION: 99 % | SYSTOLIC BLOOD PRESSURE: 148 MMHG

## 2020-10-14 DIAGNOSIS — Z17.0 MALIGNANT NEOPLASM OF UPPER-INNER QUADRANT OF RIGHT BREAST IN FEMALE, ESTROGEN RECEPTOR POSITIVE (HCC): Primary | ICD-10-CM

## 2020-10-14 DIAGNOSIS — C50.211 MALIGNANT NEOPLASM OF UPPER-INNER QUADRANT OF RIGHT BREAST IN FEMALE, ESTROGEN RECEPTOR POSITIVE (HCC): Primary | ICD-10-CM

## 2020-10-14 DIAGNOSIS — N63.10 BREAST MASS, RIGHT: ICD-10-CM

## 2020-10-14 PROCEDURE — 99204 OFFICE O/P NEW MOD 45 MIN: CPT | Performed by: SURGERY

## 2020-10-14 NOTE — PROGRESS NOTES
Ms. Robby Nichols is a 64year old woman with recurrent right mT2N0 ER/IL positive, HER negative breast cancer s/p bilateral mastectomy with sentinel node biopsy 9/25/15 with excision of right chest wall recurrence 10/19/18 in [place]. Her recon structive surgery was complicated requiring multiple operations. She took AI for a few months after her initial diagnosis. She reports right breast mass which she initally noted as a skin lesion in her 35s. At that time she could express material from it. She is no longer able to do that. It has never been infected. Breast/GYN history:    OB History        0    Para   0    Term   0       0    AB   0    Living           SAB   0    TAB   0    Ectopic   0    Molar        Multiple        Live Births              Obstetric Comments   Menarche:  15. LMP: age 38. # of Children:  2. Age at Delivery of First Child:  26.   Hysterectomy/oophorectomy:  NO/NO. Breast Bx:  Yes RIGHT . Hx of Breast Feeding:  no. BCP:  Yes,  to . Hormone therapy:  yes.                    Past Medical History:   Diagnosis Date    Acid reflux     Adverse effect of anesthesia     sister has a lot of allergies and issues with anesthesia per pt    BPPV (benign paroxysmal positional vertigo)     Breast cancer ([facility] 2014    right    Environmental allergies     Gallstones     Negative workup in     GERD (gastroesophageal reflux disease)     HTN (hypertension) 2015    Hypertension 2013    Hypertension     Hypoactive thyroid     Hypothyroidism 2015    Mixed hyperlipidemia 2016    Morbid obesity ([facility]     Prediabetes 2016       Past Surgical History:   Procedure Laterality Date    HX BREAST BIOPSY Right 2018    RIGHT BREAST SKIN EXCISIONAL BIOPSY, performed by Hima Sepulveda MD at [address] HX BREAST RECONSTRUCTION Bilateral 2015    BREAST RECONSTRUCTION performed by Brandt Jon MD at [facility] AM[facility]UL[facility]ORY OR    HX BREAST RECONSTRUCTION Right 11/10/2015    REMOVAL RIGHT BREAST TISSUE EXPANDER performed by Elvin Hernandez MD at [facility] MAIN OR    HX BREAST RECONSTRUCTION Right 2/19/2016    PLACEMENT RIGHT TISSUE EXPANDER  performed by Elvin Hernandez MD at [facility] MAIN OR    HX BREAST RECONSTRUCTION Bilateral 10/21/2016    EXCHANGE BILATERAL TISSUE EXPANDER FOR BREAST IMPLANT performed by Elvin Hernandez MD at [facility] MAIN OR    HX CHOLECYSTECTOMY      [address]    Vaginal deliveries    HX LAP CHOLECYSTECTOMY      HX MASTECTOMY Bilateral 9/24/2015    BILATERAL BREAST SKIN SPARING MASTECTOMY, RIGHT SENTINEL NODE BIOPSY/BILATERAL BREAST RECONSTRUCTION W/TISSUE EXPANDERS AND ALLODERM performed by Madison Tavera MD at [facility] AMBULATORY OR    HX VASCULAR ACCESS  12/2018    upper left chest    IA ERCP REMOVE CALCULI/DEBRIS BILIARY/PANCREAS DUCT  3/25/2016         IA ERCP W/SPHINCTEROTOMY/PAPILLOTOMY  3/25/2016            Current Outpatient Medications on File Prior to Visit   Medication Sig Dispense Refill    clobetasoL (TEMOVATE) 0.05 % topical cream apply to affected area twice a day 60 g 0    amLODIPine (NORVASC) 10 mg tablet Take 1 Tab by mouth daily. 90 Tab 1    letrozole (FEMARA) 2.5 mg tablet Take 1 Tab by mouth daily. 90 Tab 3    lisinopril-hydroCHLOROthiazide (PRINZIDE, ZESTORETIC) 20-12.5 mg per tablet take 2 tablets by mouth once daily 180 Tab 3    omeprazole (PRILOSEC) 20 mg capsule Take 20 mg by mouth nightly. 90 Cap 3    atorvastatin (LIPITOR) 10 mg tablet Take 1 Tab by mouth nightly. 90 Tab 3    levothyroxine (SYNTHROID) 25 mcg tablet Take 1 Tab by mouth Daily (before breakfast). 90 Tab 3    budesonide (RHINOCORT AQUA) 32 mcg/actuation nasal spray 2 Sprays by Both Nostrils route daily. 1 Bottle 0     No current facility-administered medications on file prior to visit.         No Known Allergies    Social History     Tobacco Use    Smoking status: Current Every Day Smoker Packs/day: 2.00     Years: 30.00     Pack years: 60.00     Types: Cigarettes    Smokeless tobacco: Never Used   Substance Use Topics    Alcohol use: Yes     Alcohol/week: 7.0 standard drinks     Types: 7 Glasses of wine per week     Frequency: 2-3 times a week    Drug use: Never       Family History   Problem Relation Age of Onset    Heart Disease Mother     Stroke Father     Asthma Sister     Hypertension Sister          ROS: positives are bolded  General: fevers, chills, night sweats, fatigue, weight loss, weight gain  GI: nausea, vomiting, abdominal pain, change in bowel habits, hematochezia, melena, GERD  Integ: dermatitis, abnormal moles  HEENT: visual changes, vertigo, epistaxis, dysphagia, hoarseness  Cardiac: chest pain, palpitations, HTN, edema, varicosities  Resp: cough, shortness of breath, wheezing, hemoptysis, snoring, reactive airway disease  : hematuria, dysuria, frequency, urgency, nocturia, stress urinary incontinence   MSK: weakness, joint pain, arthritis  Endocrine:  thyroid disease, polyuria, polydipsia, polyphagia, poor wound healing, heat intolerance, cold intolerance  Lymph/Heme: anemia, bruising, history of blood transfusions  Neuro: dizziness, headache, fainting, seizures, stroke  Psych: anxiety, depression    Physical Exam:  Visit Vitals  BP (!) 148/86   Pulse 94   Temp 98.7 °F (37.1 °C)   Ht 5' 1\" (1.549 m)   Wt 88 kg (194 lb)   SpO2 99%   BMI 36.66 kg/m²       Gen:  No distress  Head: normocephalic, atraumatic  Mouth: Clear, no overt lesions, oral mucosa pink and moist  Neck: supple, no masses, no adenopathy, trachea midline  Resp: clear bilaterally  Cardio: Regular rate and rhythm  Abdomen: soft, nontender, nondistended  Extremeties: warm, well-perfused  Neuro: sensation and strength grossly intact and symmetrical  Psych: alert and oriented to person, place and time  Breasts:   Right: Examined in both the supine and upright positions.   There was no supraclavicular, infraclavicular, or axillary lympadenopathy. There were no dominant masses, no skin changes, no asymmetry identified s/p bilateral mastectomy with healed surgical scars and implant bilaterally, smooth nodule upper inner? Skin nodule  Left: Examined in both the supine and upright positions. There was no supraclavicular, infraclavicular, or axillary lympadenopathy. There were no dominant masses, no skin changes, no asymmetry identified s/p bilateral mastectomy with healed surgical scars and implant bilaterally    Pathology:  12/7/18   Right chest wall skin; skin and subcutis, excision:   Residual invasive ductal carcinoma, 3 mm, within fibrous and adipose tissue   Medial margin is positive for tumor   Other margins are negative for tumor   Suture granuloma/fibrosis   Benign epidermis     10/19/18  Right breast mass, excision:   Adenocarcinoma, up to 1.1 cm, involving dermis and subcutaneous tissues, morphologically compatible with breast primary   Lymphovascular invasion: Not identified   Margins: Focally positive (approximately 2 mm span)   Assessment of hormone receptors and HER-2 by IHC is pending, please see addendum reports    Estrogen receptor 100% strong positive staining POSITIVE   Progesterone receptor 95% strong staining POSITIVE   HER-2/gareth Immunohistochemisty (IHC)   Results: Negative, score 1+     9/24/15  1. Left breast, simple mastectomy:  Fibrocystic changes. Intraductal papilloma (0.1-0.2 cm). Negative for carcinoma. 2. Right axillary sentinel lymph node #1, biopsy  One lymph node, negative for carcinoma (levels and cytokeratin stain examined). 3. Right axillary sentinel node #2, biopsy:  One lymph node, negative for carcinoma (levels and cytokeratin stain examined). 4. Right axillary sentinel node #3, biopsy:  One lymph node, negative for carcinoma (levels and cytokeratin stain examined).   5. Right breast, simple mastectomy:  Invasive ductal carcinoma, two foci measuring 2.2 and 3.5 cm, grade 2, favors synchronous primaries  Ductal carcinoma in situ, nuclear grade 2, associated with both invasive carcinomas  Calcifications present in association with invasive carcinoma and DCIS  Biopsy sites  See synoptic report  6. Right breast anterior margin at 2:00, excision:  Invasive ductal carcinoma, several microscopic foci as large as 0.2 cm (slides 6C and 6D). New anterior margin: 0.1 cm.   Synoptic Report:  Specimen: Complete breast  Procedure: Simple mastectomy  Lymph node sampling: [place] lymph nodes  Specimen integrity: Single intact specimen  Specimen size: 17.0 x 16.0 x 7.0  Specimen laterality: Right  Tumor size: Tumor #1 2:00 upper inner quadrant: 2.2 cm  Tumor #2 12:00: 3.5 cm  Tumor focality: Multifocal  Macroscopic and microscopic extent of tumor:  Skin: Skin is present and not involved  Nipple: Nipple is present and not involved  Skeletal muscle: Limited skeletal muscle present not involved  Ductal carcinoma in situ: DCIS is present, extensive DCIS associated with 12:00 tumor  Extent of DCIS: Up to 2.2 cm  Architectural pattern: Cribriform and papillary  Nuclear grade: Grade 2  Necrosis: Present, focal  Lobular carcinoma in situ: Not identified  Histologic type of invasive carcinoma: Invasive ductal carcinoma  Histologic Grade/Khoi histologic score  Glandular differentiation: 2  Nuclear pleomorphism: 1-2  Mitotic count: 1  Histologic Score: 5  Overall Grade: Grade 1  Margins:  Margins uninvolved by invasive carcinoma  Distance from closest margin is 0.1 cm to anterior (specimen #6)  Margins uninvolved by DCIS  Distance from closest margin is 0.6 cm to deep  Lymphovascular invasion: Not identified  Lymph nodes:  Number of sentinel lymph nodes examined: 3  Total number of lymph nodes examined: 3  Number of lymph nodes with metastases: 0  Estrogen receptor, progesterone receptor and HER-2 from [id number], both tumors:  Estrogen and progesterone receptor: Positive, positive  HER-2 IHC: Negative  Additional pathologic findings: Fibrocystic changes  Preferred block for add-on testinC (tumor #1) and 5G (tumor #2)  Pathologic staging (pTNM):  Primary tumor (pT): mT2  Regional lymph nodes (pN): N0 (sn        Impression:  Patient Active Problem List   Diagnosis Code    Acid reflux K21.9    Hypertension I10    BPPV (benign paroxysmal positional vertigo) H81.10    Environmental allergies Z91.09    Hypothyroid E03.9    Breast cancer of upper-inner quadrant of right female breast ([facility] C50.211    Acute pancreatitis K85.90    Prediabetes R73.03    Mixed hyperlipidemia E78.2    GERD (gastroesophageal reflux disease) K21.9    Morbid obesity due to excess calories (HCC) E66.01    Dyslipidemia E78.5    Breast mass, right N63.10          64year old woman with recurrent right mT2N0 ER/DE positive, HER negative breast cancer s/p bilateral mastectomy with sentinel node biopsy 9/25/15 with excision of right chest wall recurrence 10/19/18 in [place] with right breast mass. I have recommended imaging to evaluate further. We discussed possible need for image guided biopsy versus observation pending these results. Additional recommendations pending imaging findings. All questions were answered. She was asked to call with any additional questions or concerns.

## 2020-10-14 NOTE — LETTER
10/14/2020 11:36 AM 
 
Patient:  Pilar Nichols YOB: 1960 Date of Visit: 10/14/2020 Martinez Michelle MD 
[facility] [address] [address] [place] [address] VIA In Basket Dear Martinez Michelle MD, 
 
 
I had the pleasure of seeing Ms. Elizabeth Wells in my office today for her right breast mas in the setting of previous right breast cancer. I am including a copy of my office visit today. If you have questions, please do not hesitate to call me. I look forward to following Ms. Nichols along with you and will keep you updated as to her progress. Sincerely, Raven Black MD

## 2020-10-23 ENCOUNTER — TELEPHONE (OUTPATIENT)
Dept: SURGERY | Age: 60
End: 2020-10-23

## 2020-10-23 NOTE — TELEPHONE ENCOUNTER
Spoke to Ms. Nichols to inform of right breast ultrasound scheduled on Wednesday, October 28, 2020 at 1:45pm at [address] at [facility].

## 2020-10-27 ENCOUNTER — TELEPHONE (OUTPATIENT)
Dept: SURGERY | Age: 60
End: 2020-10-27

## 2020-10-27 NOTE — TELEPHONE ENCOUNTER
Ms. Eden Del Valle called stating she need to reschedule her [facility] for the right breast ultrasound and states she'd like to reschedule for November 12, 2020 around 3pm if possible. Informed I'll contact center and notify of their availability on November 12, 2020.

## 2020-11-12 ENCOUNTER — HOSPITAL ENCOUNTER (OUTPATIENT)
Dept: ULTRASOUND IMAGING | Age: 60
End: 2020-11-12
Attending: SURGERY

## 2020-11-27 ENCOUNTER — HOSPITAL ENCOUNTER (OUTPATIENT)
Dept: ULTRASOUND IMAGING | Age: 60
Discharge: HOME | End: 2020-11-27
Attending: SURGERY
Payer: COMMERCIAL

## 2020-11-27 DIAGNOSIS — Z85.3 PERSONAL HISTORY OF MALIGNANT NEOPLASM OF BREAST: ICD-10-CM

## 2020-11-27 DIAGNOSIS — N63.0 LUMP OR MASS IN BREAST: ICD-10-CM

## 2020-11-27 PROCEDURE — 76642 ULTRASOUND BREAST LIMITED: CPT

## 2020-12-07 ENCOUNTER — TELEPHONE (OUTPATIENT)
Dept: SURGERY | Age: 60
End: 2020-12-07

## 2020-12-07 NOTE — TELEPHONE ENCOUNTER
Ms. Donnell Richardson called stating she had the right breast ultrasound and the result didn't come back suspicious however because of her history of breast cancer they're recommending a biopsy. Ms. Donnell Richardson states she only want the biopsy/sample she do not want to schedule to have the area remove and  if possible she'd like to schedule the biopsy on Friday, December 18, 2020. I explained to Ms. Nichols I will contact her to assist with scheduling procedure after I obtain order from Dr. Jose F Villalobos.

## 2020-12-08 DIAGNOSIS — C50.211 MALIGNANT NEOPLASM OF UPPER-INNER QUADRANT OF RIGHT BREAST IN FEMALE, ESTROGEN RECEPTOR POSITIVE (HCC): Primary | ICD-10-CM

## 2020-12-08 DIAGNOSIS — Z17.0 MALIGNANT NEOPLASM OF UPPER-INNER QUADRANT OF RIGHT BREAST IN FEMALE, ESTROGEN RECEPTOR POSITIVE (HCC): Primary | ICD-10-CM

## 2020-12-18 ENCOUNTER — HOSPITAL ENCOUNTER (OUTPATIENT)
Dept: ULTRASOUND IMAGING | Age: 60
Discharge: HOME | End: 2020-12-18
Attending: SURGERY
Payer: COMMERCIAL

## 2020-12-18 PROCEDURE — 88307 TISSUE EXAM BY PATHOLOGIST: CPT

## 2020-12-18 PROCEDURE — 74011000250 HC RX REV CODE- 250: Performed by: SURGERY

## 2020-12-18 PROCEDURE — 19083 BX BREAST 1ST LESION US IMAG: CPT

## 2020-12-18 PROCEDURE — 88304 TISSUE EXAM BY PATHOLOGIST: CPT

## 2020-12-18 RX ORDER — LIDOCAINE HYDROCHLORIDE AND EPINEPHRINE 10; 10 MG/ML; UG/ML
1.5 INJECTION, SOLUTION INFILTRATION; PERINEURAL ONCE
Status: COMPLETED | OUTPATIENT
Start: 2020-12-18 | End: 2020-12-18

## 2020-12-18 RX ORDER — LIDOCAINE HYDROCHLORIDE 10 MG/ML
5 INJECTION, SOLUTION EPIDURAL; INFILTRATION; INTRACAUDAL; PERINEURAL
Status: COMPLETED | OUTPATIENT
Start: 2020-12-18 | End: 2020-12-18

## 2020-12-18 RX ADMIN — LIDOCAINE HYDROCHLORIDE AND EPINEPHRINE 15 MG: 10; 10 INJECTION, SOLUTION INFILTRATION; PERINEURAL at 10:43

## 2020-12-18 RX ADMIN — LIDOCAINE HYDROCHLORIDE 5 ML: 10 INJECTION, SOLUTION EPIDURAL; INFILTRATION; INTRACAUDAL; PERINEURAL at 10:42

## 2020-12-21 ENCOUNTER — TELEPHONE (OUTPATIENT)
Dept: SURGERY | Age: 60
End: 2020-12-21

## 2020-12-21 NOTE — TELEPHONE ENCOUNTER
Patient had a US biopsy done on 12/18/20. She states she woke up today and the area is painful, red, and warm to the touch. Patient is concerned that the area might be infected. Patient denies fever or drainage.

## 2020-12-22 ENCOUNTER — TELEPHONE (OUTPATIENT)
Dept: SURGERY | Age: 60
End: 2020-12-22

## 2020-12-22 NOTE — TELEPHONE ENCOUNTER
Spoke to Ms. Nichols and offered her an appointment for her to be seen by Dr. Reece Gomes tomorrow at the [facility] office to evaluate the breast pain, redness, warmth to touch biopsy site following recent US breast biopsy on Friday, December 18, 2020. Ms. Nichols declined the appointment, stating this is a short work week and there's no way she can come in tomorrow for an appointment. She also stated our  office is 30 minutes away and she was hoping she could just get  prescription for an antibiotic. Ms. Eulalio Thomas state the area is improving and she's going to keep an eye on it and may possibly go see her PCP who's [place].

## 2020-12-23 NOTE — TELEPHONE ENCOUNTER
Patient returned call to office. I notified patient of results per Dr Titus Skiff. Patient states \" hallelujah\"  Patient verbalized understanding. I also asked patient how was she feeling following the excision due to concerns of pain and swelling. Patient states feeling much better. States was told she had a hematoma a pocket of blood and it will absorb into her body over time. Patient states each day it is getting better and she is doing well. I advised patient  Please call the office if she has any further concerns. Patient verbalized understanding.        ----- Message from Reuben Greco MD sent at 12/23/2020  8:10 AM EST -----  Please let her know there was no evidence of  cancer. We will discuss the results in detail at her follow up appointment.   Thank you    ----- Message -----  From: [facility] Results  Sent: 12/22/2020   3:45 PM EST  To: Reuben Greco MD

## 2020-12-23 NOTE — TELEPHONE ENCOUNTER
Left non- urgent VM for patient to call office regarding pathology results per Dr Chino Mathew.    ----- Message from Raven Black MD sent at 12/23/2020  8:10 AM EST -----  Please let her know there was no evidence of  cancer. We will discuss the results in detail at her follow up appointment.   Thank you    ----- Message -----  From: [facility] Results  Sent: 12/22/2020   3:45 PM EST  To: Raven Black MD

## 2020-12-28 ENCOUNTER — TELEPHONE (OUTPATIENT)
Dept: FAMILY MEDICINE CLINIC | Age: 60
End: 2020-12-28

## 2020-12-28 NOTE — TELEPHONE ENCOUNTER
Pt stated she had a biopsy done that  [facility] recommend she have and they found an infection and the pt would like to have it looked at. Advised pt that  [facility] is out of the office this week but will have a nurse to contact back when available for advised.

## 2021-01-04 NOTE — TELEPHONE ENCOUNTER
Patient called again. Patient states she has an infection (abscess) due to biopsy. She was prescribed antibiotics from ER. She took her last pill today but fears her infection will not go away without more antibiotics. Please advise.

## 2021-01-04 NOTE — TELEPHONE ENCOUNTER
Patient was seen at the ER for the infection and was given Bactrim for 7 days, she took her last antibiotic and now is worried that it has not completely healed. Would like another round of antibiotics called in. Patient states that it is getting better, still some drainage. Due to scheduling, there is no open appointments available.   Please advise

## 2021-01-05 RX ORDER — SULFAMETHOXAZOLE AND TRIMETHOPRIM 800; 160 MG/1; MG/1
1 TABLET ORAL 2 TIMES DAILY
Qty: 20 TAB | Refills: 0 | Status: SHIPPED | OUTPATIENT
Start: 2021-01-05 | End: 2021-01-15

## 2021-01-05 NOTE — TELEPHONE ENCOUNTER
Patient called back to see if the provider had gotten the message for the refill of antibiotic for the boil she had drained in the ER.     Please advise

## 2021-01-21 DIAGNOSIS — I10 ESSENTIAL HYPERTENSION: ICD-10-CM

## 2021-01-21 RX ORDER — AMLODIPINE BESYLATE 10 MG/1
TABLET ORAL
Qty: 90 TAB | Refills: 1 | Status: SHIPPED | OUTPATIENT
Start: 2021-01-21 | End: 2021-07-21

## 2021-07-21 DIAGNOSIS — I10 ESSENTIAL HYPERTENSION: ICD-10-CM

## 2021-07-21 RX ORDER — AMLODIPINE BESYLATE 10 MG/1
TABLET ORAL
Qty: 90 TABLET | Refills: 1 | Status: SHIPPED | OUTPATIENT
Start: 2021-07-21 | End: 2021-10-26 | Stop reason: SDUPTHER

## 2021-10-24 DIAGNOSIS — C50.211 MALIGNANT NEOPLASM OF UPPER-INNER QUADRANT OF RIGHT BREAST IN FEMALE, ESTROGEN RECEPTOR POSITIVE (HCC): ICD-10-CM

## 2021-10-24 DIAGNOSIS — Z17.0 MALIGNANT NEOPLASM OF UPPER-INNER QUADRANT OF RIGHT BREAST IN FEMALE, ESTROGEN RECEPTOR POSITIVE (HCC): ICD-10-CM

## 2021-10-24 DIAGNOSIS — K21.9 GASTROESOPHAGEAL REFLUX DISEASE: ICD-10-CM

## 2021-10-25 RX ORDER — LETROZOLE 2.5 MG/1
TABLET, FILM COATED ORAL
Qty: 30 TABLET | Refills: 1 | Status: SHIPPED | OUTPATIENT
Start: 2021-10-25 | End: 2022-01-10

## 2021-10-25 RX ORDER — OMEPRAZOLE 20 MG/1
CAPSULE, DELAYED RELEASE ORAL
Qty: 90 CAPSULE | Refills: 3 | Status: SHIPPED | OUTPATIENT
Start: 2021-10-25 | End: 2022-10-19

## 2021-10-26 DIAGNOSIS — K21.9 GASTROESOPHAGEAL REFLUX DISEASE: ICD-10-CM

## 2021-10-26 DIAGNOSIS — I10 ESSENTIAL HYPERTENSION: ICD-10-CM

## 2021-10-26 RX ORDER — OMEPRAZOLE 20 MG/1
CAPSULE, DELAYED RELEASE ORAL
Qty: 90 CAPSULE | Refills: 3 | Status: CANCELLED | OUTPATIENT
Start: 2021-10-26

## 2021-10-26 RX ORDER — LISINOPRIL AND HYDROCHLOROTHIAZIDE 12.5; 2 MG/1; MG/1
TABLET ORAL
Qty: 180 TABLET | Refills: 3 | Status: CANCELLED | OUTPATIENT
Start: 2021-10-26

## 2021-10-26 NOTE — TELEPHONE ENCOUNTER
Requested Prescriptions     Pending Prescriptions Disp Refills    lisinopril-hydroCHLOROthiazide (PRINZIDE, ZESTORETIC) 20-12.5 mg per tablet 180 Tablet 3     Sig: take 2 tablets by mouth once daily    omeprazole (PRILOSEC) 20 mg capsule 90 Capsule 3     Sig: take 1 capsule by mouth at bedtime    amLODIPine (NORVASC) 10 mg tablet 90 Tablet 1     Sig: Take 1 Tablet by mouth daily. pt is not scheduled with Dr Malolry Khalil until 12/08 and she want to make sure she has enough medication to last her until her appointment date.  Please be advised

## 2021-10-28 DIAGNOSIS — I10 ESSENTIAL HYPERTENSION: ICD-10-CM

## 2021-10-28 RX ORDER — LISINOPRIL AND HYDROCHLOROTHIAZIDE 12.5; 2 MG/1; MG/1
TABLET ORAL
Qty: 180 TABLET | Refills: 3 | Status: SHIPPED | OUTPATIENT
Start: 2021-10-28 | End: 2022-10-19

## 2021-11-01 RX ORDER — AMLODIPINE BESYLATE 10 MG/1
10 TABLET ORAL DAILY
Qty: 90 TABLET | Refills: 1 | Status: SHIPPED | OUTPATIENT
Start: 2021-11-01 | End: 2022-07-21

## 2021-11-01 NOTE — TELEPHONE ENCOUNTER
Requested Prescriptions     Pending Prescriptions Disp Refills    amLODIPine (NORVASC) 10 mg tablet 90 Tablet 1     Sig: Take 1 Tablet by mouth daily. [facility] Mitch Nichols called for their medication refill.     Last Office visit:  08-  Last labs:  08-  Follow up visit:  12-  Last date prescribe 07-    Please Advise'

## 2021-12-08 ENCOUNTER — VIRTUAL VISIT (OUTPATIENT)
Dept: FAMILY MEDICINE CLINIC | Age: 61
End: 2021-12-08

## 2021-12-08 DIAGNOSIS — I10 ESSENTIAL HYPERTENSION: Primary | ICD-10-CM

## 2021-12-08 DIAGNOSIS — C50.211 MALIGNANT NEOPLASM OF UPPER-INNER QUADRANT OF RIGHT BREAST IN FEMALE, ESTROGEN RECEPTOR POSITIVE (HCC): ICD-10-CM

## 2021-12-08 DIAGNOSIS — E03.9 HYPOTHYROIDISM, UNSPECIFIED TYPE: ICD-10-CM

## 2021-12-08 DIAGNOSIS — R73.9 HYPERGLYCEMIA: ICD-10-CM

## 2021-12-08 DIAGNOSIS — Z17.0 MALIGNANT NEOPLASM OF UPPER-INNER QUADRANT OF RIGHT BREAST IN FEMALE, ESTROGEN RECEPTOR POSITIVE (HCC): ICD-10-CM

## 2021-12-08 DIAGNOSIS — Z12.11 SCREEN FOR COLON CANCER: ICD-10-CM

## 2021-12-08 DIAGNOSIS — K21.9 GASTROESOPHAGEAL REFLUX DISEASE, UNSPECIFIED WHETHER ESOPHAGITIS PRESENT: ICD-10-CM

## 2021-12-08 DIAGNOSIS — E78.5 DYSLIPIDEMIA: ICD-10-CM

## 2021-12-08 PROCEDURE — 99443 PR PHYS/QHP TELEPHONE EVALUATION 21-30 MIN: CPT | Performed by: FAMILY MEDICINE

## 2021-12-08 NOTE — PROGRESS NOTES
Bethanie Holstein Doe is a 63 y.o. female, evaluated via audio-only technology on 12/8/2021 for Follow Up Chronic Condition  . Assessment & Plan:       ICD-10-CM ICD-9-CM    1. Essential hypertension  I10 401.9 LIPID PANEL      METABOLIC PANEL, COMPREHENSIVE      CBC WITH AUTOMATED DIFF   2. Gastroesophageal reflux disease, unspecified whether esophagitis present  K21.9 530.81    3. Malignant neoplasm of upper-inner quadrant of right breast in female, estrogen receptor positive ([facility]  C50.211 174.2     Z17.0 V86.0    4. Dyslipidemia  E78.5 272.4    5. Hyperglycemia  R73.9 790.29 HEMOGLOBIN A1C WITH EAG   6. Hypothyroidism, unspecified type  E03.9 244.9 TSH 3RD GENERATION   7. Screen for colon cancer  Z12.11 V76.51 OCCULT BLOOD IMMUNOASSAY,DIAGNOSTIC     12  Subjective:   Bethanie Holstein Doe had this visit for follow-up care. Hypertension: Patient has hypertension. She is on lisinopril and amlodipine. States that her blood pressure has been stable though she has not checked lately. She denies headache, changes in vision or focal weakness. She will come in for lab check. GERD: Patient has gastroesophageal reflux disease. She is on omeprazole. Stable on medication. Denies hematemesis or dark stools. Breast cancer: Patient has history of breast cancer. She is on letrozole. Continue current treatment plan. Hypothyroidism: Patient has a history of hypothyroidism. She was on thyroid replacement therapy. Has stopped taking the medication in a while. We will recheck labs. Dyslipidemia: Patient has a history of dyslipidemia. Not on medication. She is taking a diet low in polysaturated fats. We will recheck lipid panel. Health maintenance: Patient declines flu vaccine and pneumonia vaccine. She declines Pap smear. She declines colonoscopy. She is willing to do the FIT stool test.  I will place a request for this. Prior to Admission medications    Medication Sig Start Date End Date Taking?  Authorizing Provider   amLODIPine (NORVASC) 10 mg tablet Take 1 Tablet by mouth daily. 11/1/21  Yes Janet Bobby MD   lisinopril-hydroCHLOROthiazide (PRINZIDE, ZESTORETIC) 20-12.5 mg per tablet take 2 tablets by mouth once daily 10/28/21  Yes Janet Bobby MD   letrozole ([facility] [facility]) 2.5 mg tablet take 1 tablet by mouth once daily 10/25/21  Yes Janet Bobby MD   omeprazole (PRILOSEC) 20 mg capsule take 1 capsule by mouth at bedtime 10/25/21  Yes Milan Cody MD   atorvastatin (LIPITOR) 10 mg tablet Take 1 Tab by mouth nightly. 10/31/19  Yes Janet Bobby MD   clobetasoL (TEMOVATE) 0.05 % topical cream apply to affected area twice a day 8/6/20 12/8/21  Milan Cody MD   levothyroxine (SYNTHROID) 25 mcg tablet Take 1 Tab by mouth Daily (before breakfast). 10/31/19 12/8/21  Milan Cody MD   budesonide (RHINOCORT AQUA) 32 mcg/actuation nasal spray 2 Sprays by Both Nostrils route daily. 5/20/19 12/8/21  MD KOLE Shin Review of all systems is negative except as noted above in the HPI. Patient-Reported Vitals 12/8/2021   Patient-Reported Weight 194 lbs   Patient-Reported Height -       Jerilyn GILLILAND Simone, who was evaluated through a patient-initiated, synchronous (real-time) audio only encounter, and/or her healthcare decision maker, is aware that it is a billable service, with coverage as determined by her insurance carrier. She provided verbal consent to proceed: Yes. She has not had a related appointment within my department in the past 7 days or scheduled within the next 24 hours. On this date 12/08/2021 I have spent 30 minutes reviewing previous notes, test results and face to face (virtual) with the patient discussing the diagnosis and importance of compliance with the treatment plan as well as documenting on the day of the visit.     Jessica Ferrer MD

## 2021-12-08 NOTE — PROGRESS NOTES
Chief Complaint   Patient presents with    Follow Up Chronic Condition     1. \"Have you been to the ER, urgent care clinic since your last visit? Hospitalized since your last visit? \" No    2. \"Have you seen or consulted any other health care providers outside of the [address] [place] [address] since your last visit? \" No     3. For patients aged 39-70: Has the patient had a colonoscopy / FIT/ Cologuard? No     If the patient is female:    4. For patients aged 41-77: Has the patient had a mammogram within the past 2 years? No    5. For patients aged 21-65: Has the patient had a pap smear?  No

## 2021-12-09 ENCOUNTER — APPOINTMENT (OUTPATIENT)
Dept: FAMILY MEDICINE CLINIC | Age: 61
End: 2021-12-09

## 2021-12-09 DIAGNOSIS — R73.9 HYPERGLYCEMIA: ICD-10-CM

## 2021-12-09 DIAGNOSIS — E03.9 HYPOTHYROIDISM, UNSPECIFIED TYPE: ICD-10-CM

## 2021-12-09 DIAGNOSIS — I10 ESSENTIAL HYPERTENSION: ICD-10-CM

## 2021-12-10 LAB
ALBUMIN SERPL-MCNC: 5 G/DL (ref 3.8–4.9)
ALBUMIN/GLOB SERPL: 2.4 {RATIO} (ref 1.2–2.2)
ALP SERPL-CCNC: 65 IU/L (ref 44–121)
ALT SERPL-CCNC: 18 IU/L (ref 0–32)
AST SERPL-CCNC: 15 IU/L (ref 0–40)
BASOPHILS # BLD AUTO: 0 X10E3/UL (ref 0–0.2)
BASOPHILS NFR BLD AUTO: 0 %
BILIRUB SERPL-MCNC: 0.3 MG/DL (ref 0–1.2)
BUN SERPL-MCNC: 24 MG/DL (ref 6–24)
BUN/CREAT SERPL: 23 (ref 9–23)
CALCIUM SERPL-MCNC: 10.3 MG/DL (ref 8.7–10.2)
CHLORIDE SERPL-SCNC: 96 MMOL/L (ref 96–106)
CHOLEST SERPL-MCNC: 294 MG/DL (ref 100–199)
CO2 SERPL-SCNC: 22 MMOL/L (ref 20–29)
CREAT SERPL-MCNC: 1.05 MG/DL (ref 0.57–1)
EOSINOPHIL # BLD AUTO: 0.2 X10E3/UL (ref 0–0.4)
EOSINOPHIL NFR BLD AUTO: 2 %
ERYTHROCYTE [DISTWIDTH] IN BLOOD BY AUTOMATED COUNT: 13.2 % (ref 11.7–15.4)
EST. AVERAGE GLUCOSE BLD GHB EST-MCNC: 108 MG/DL
GLOBULIN SER CALC-MCNC: 2.1 G/DL (ref 1.5–4.5)
GLUCOSE SERPL-MCNC: 128 MG/DL (ref 65–99)
HBA1C MFR BLD: 5.4 % (ref 4.8–5.6)
HCT VFR BLD AUTO: 40 % (ref 34–46.6)
HDLC SERPL-MCNC: 66 MG/DL
HGB BLD-MCNC: 14.6 G/DL (ref 11.1–15.9)
IMM GRANULOCYTES # BLD AUTO: 0 X10E3/UL (ref 0–0.1)
IMM GRANULOCYTES NFR BLD AUTO: 0 %
IMP & REVIEW OF LAB RESULTS: NORMAL
INTERPRETATION: NORMAL
LDLC SERPL CALC-MCNC: 200 MG/DL (ref 0–99)
LYMPHOCYTES # BLD AUTO: 1.8 X10E3/UL (ref 0.7–3.1)
LYMPHOCYTES NFR BLD AUTO: 21 %
MCH RBC QN AUTO: 36.8 PG (ref 26.6–33)
MCHC RBC AUTO-ENTMCNC: 36.5 G/DL (ref 31.5–35.7)
MCV RBC AUTO: 101 FL (ref 79–97)
MONOCYTES # BLD AUTO: 0.6 X10E3/UL (ref 0.1–0.9)
MONOCYTES NFR BLD AUTO: 7 %
MORPHOLOGY BLD-IMP: ABNORMAL
NEUTROPHILS # BLD AUTO: 5.9 X10E3/UL (ref 1.4–7)
NEUTROPHILS NFR BLD AUTO: 70 %
PLATELET # BLD AUTO: 350 X10E3/UL (ref 150–450)
POTASSIUM SERPL-SCNC: 4.1 MMOL/L (ref 3.5–5.2)
PROT SERPL-MCNC: 7.1 G/DL (ref 6–8.5)
RBC # BLD AUTO: 3.97 X10E6/UL (ref 3.77–5.28)
SODIUM SERPL-SCNC: 136 MMOL/L (ref 134–144)
TRIGL SERPL-MCNC: 152 MG/DL (ref 0–149)
TSH SERPL DL<=0.005 MIU/L-ACNC: 4.27 UIU/ML (ref 0.45–4.5)
VLDLC SERPL CALC-MCNC: 28 MG/DL (ref 5–40)
WBC # BLD AUTO: 8.4 X10E3/UL (ref 3.4–10.8)

## 2021-12-13 RX ORDER — ATORVASTATIN CALCIUM 40 MG/1
40 TABLET, FILM COATED ORAL DAILY
Qty: 30 TABLET | Refills: 2 | Status: SHIPPED | OUTPATIENT
Start: 2021-12-13

## 2021-12-13 NOTE — PROGRESS NOTES
Please let patient know LDL cholesterol is elevated at 200. We would want this to go down to below 100. She is on Lipitor 10 mg. I will increase to 40 mg.  Recheck fasting lipid panel in 3 months. Her creatinine is slightly elevated at 1.05. This indicates slight reduction in renal function. It is likely because she is on a diuretic medication. She should continue good hydration status. We will recheck this at next visit. Rest of her labs are stable.   Irene Lr MD

## 2021-12-14 ENCOUNTER — TELEPHONE (OUTPATIENT)
Dept: FAMILY MEDICINE CLINIC | Age: 61
End: 2021-12-14

## 2022-01-10 DIAGNOSIS — C50.211 MALIGNANT NEOPLASM OF UPPER-INNER QUADRANT OF RIGHT BREAST IN FEMALE, ESTROGEN RECEPTOR POSITIVE (HCC): ICD-10-CM

## 2022-01-10 DIAGNOSIS — Z17.0 MALIGNANT NEOPLASM OF UPPER-INNER QUADRANT OF RIGHT BREAST IN FEMALE, ESTROGEN RECEPTOR POSITIVE (HCC): ICD-10-CM

## 2022-01-10 RX ORDER — LETROZOLE 2.5 MG/1
TABLET, FILM COATED ORAL
Qty: 30 TABLET | Refills: 1 | Status: SHIPPED | OUTPATIENT
Start: 2022-01-10 | End: 2022-03-10

## 2022-03-18 PROBLEM — N63.10 BREAST MASS, RIGHT: Status: ACTIVE | Noted: 2020-10-14

## 2022-03-19 PROBLEM — E78.5 DYSLIPIDEMIA: Status: ACTIVE | Noted: 2019-11-03

## 2022-03-19 PROBLEM — E66.01 MORBID OBESITY DUE TO EXCESS CALORIES (HCC): Status: ACTIVE | Noted: 2017-10-17

## 2022-05-20 DIAGNOSIS — C50.211 MALIGNANT NEOPLASM OF UPPER-INNER QUADRANT OF RIGHT BREAST IN FEMALE, ESTROGEN RECEPTOR POSITIVE (HCC): ICD-10-CM

## 2022-05-20 DIAGNOSIS — Z17.0 MALIGNANT NEOPLASM OF UPPER-INNER QUADRANT OF RIGHT BREAST IN FEMALE, ESTROGEN RECEPTOR POSITIVE (HCC): ICD-10-CM

## 2022-05-23 RX ORDER — LETROZOLE 2.5 MG/1
TABLET, FILM COATED ORAL
Qty: 30 TABLET | Refills: 1 | Status: SHIPPED | OUTPATIENT
Start: 2022-05-23 | End: 2022-08-02

## 2022-07-19 DIAGNOSIS — I10 ESSENTIAL HYPERTENSION: ICD-10-CM

## 2022-07-21 RX ORDER — AMLODIPINE BESYLATE 10 MG/1
TABLET ORAL
Qty: 90 TABLET | Refills: 1 | Status: SHIPPED | OUTPATIENT
Start: 2022-07-21

## 2022-08-02 DIAGNOSIS — C50.211 MALIGNANT NEOPLASM OF UPPER-INNER QUADRANT OF RIGHT BREAST IN FEMALE, ESTROGEN RECEPTOR POSITIVE (HCC): ICD-10-CM

## 2022-08-02 DIAGNOSIS — Z17.0 MALIGNANT NEOPLASM OF UPPER-INNER QUADRANT OF RIGHT BREAST IN FEMALE, ESTROGEN RECEPTOR POSITIVE (HCC): ICD-10-CM

## 2022-08-02 RX ORDER — LETROZOLE 2.5 MG/1
TABLET, FILM COATED ORAL
Qty: 30 TABLET | Refills: 1 | Status: SHIPPED | OUTPATIENT
Start: 2022-08-02 | End: 2022-10-10

## 2022-10-06 DIAGNOSIS — C50.211 MALIGNANT NEOPLASM OF UPPER-INNER QUADRANT OF RIGHT BREAST IN FEMALE, ESTROGEN RECEPTOR POSITIVE (HCC): ICD-10-CM

## 2022-10-06 DIAGNOSIS — Z17.0 MALIGNANT NEOPLASM OF UPPER-INNER QUADRANT OF RIGHT BREAST IN FEMALE, ESTROGEN RECEPTOR POSITIVE (HCC): ICD-10-CM

## 2022-10-10 RX ORDER — LETROZOLE 2.5 MG/1
TABLET, FILM COATED ORAL
Qty: 30 TABLET | Refills: 1 | Status: SHIPPED | OUTPATIENT
Start: 2022-10-10

## 2022-10-18 DIAGNOSIS — I10 ESSENTIAL HYPERTENSION: ICD-10-CM

## 2022-10-18 DIAGNOSIS — K21.9 GASTROESOPHAGEAL REFLUX DISEASE: ICD-10-CM

## 2022-10-19 RX ORDER — OMEPRAZOLE 20 MG/1
CAPSULE, DELAYED RELEASE ORAL
Qty: 90 CAPSULE | Refills: 3 | Status: SHIPPED | OUTPATIENT
Start: 2022-10-19

## 2022-10-19 RX ORDER — LISINOPRIL AND HYDROCHLOROTHIAZIDE 12.5; 2 MG/1; MG/1
TABLET ORAL
Qty: 180 TABLET | Refills: 3 | Status: SHIPPED | OUTPATIENT
Start: 2022-10-19

## 2022-12-09 DIAGNOSIS — Z17.0 MALIGNANT NEOPLASM OF UPPER-INNER QUADRANT OF RIGHT BREAST IN FEMALE, ESTROGEN RECEPTOR POSITIVE (HCC): ICD-10-CM

## 2022-12-09 DIAGNOSIS — C50.211 MALIGNANT NEOPLASM OF UPPER-INNER QUADRANT OF RIGHT BREAST IN FEMALE, ESTROGEN RECEPTOR POSITIVE (HCC): ICD-10-CM

## 2022-12-13 RX ORDER — LETROZOLE 2.5 MG/1
TABLET, FILM COATED ORAL
Qty: 30 TABLET | Refills: 1 | Status: SHIPPED | OUTPATIENT
Start: 2022-12-13

## 2023-01-26 ENCOUNTER — OFFICE VISIT (OUTPATIENT)
Dept: FAMILY MEDICINE CLINIC | Age: 63
End: 2023-01-26
Payer: COMMERCIAL

## 2023-01-26 VITALS
HEIGHT: 61 IN | BODY MASS INDEX: 45.35 KG/M2 | RESPIRATION RATE: 20 BRPM | WEIGHT: 240.2 LBS | SYSTOLIC BLOOD PRESSURE: 136 MMHG | TEMPERATURE: 97.8 F | HEART RATE: 86 BPM | OXYGEN SATURATION: 98 % | DIASTOLIC BLOOD PRESSURE: 77 MMHG

## 2023-01-26 DIAGNOSIS — E66.01 MORBID OBESITY WITH BMI OF 45.0-49.9, ADULT (HCC): ICD-10-CM

## 2023-01-26 DIAGNOSIS — I10 ESSENTIAL HYPERTENSION: Primary | ICD-10-CM

## 2023-01-26 DIAGNOSIS — C50.211 MALIGNANT NEOPLASM OF UPPER-INNER QUADRANT OF RIGHT BREAST IN FEMALE, ESTROGEN RECEPTOR POSITIVE (HCC): ICD-10-CM

## 2023-01-26 DIAGNOSIS — E78.5 DYSLIPIDEMIA: ICD-10-CM

## 2023-01-26 DIAGNOSIS — L40.9 PSORIASIS: ICD-10-CM

## 2023-01-26 DIAGNOSIS — K21.9 GASTROESOPHAGEAL REFLUX DISEASE: ICD-10-CM

## 2023-01-26 DIAGNOSIS — R73.9 HYPERGLYCEMIA: ICD-10-CM

## 2023-01-26 DIAGNOSIS — Z17.0 MALIGNANT NEOPLASM OF UPPER-INNER QUADRANT OF RIGHT BREAST IN FEMALE, ESTROGEN RECEPTOR POSITIVE (HCC): ICD-10-CM

## 2023-01-26 LAB — HBA1C MFR BLD HPLC: 5.7 %

## 2023-01-26 PROCEDURE — 3078F DIAST BP <80 MM HG: CPT | Performed by: FAMILY MEDICINE

## 2023-01-26 PROCEDURE — 3075F SYST BP GE 130 - 139MM HG: CPT | Performed by: FAMILY MEDICINE

## 2023-01-26 PROCEDURE — 99214 OFFICE O/P EST MOD 30 MIN: CPT | Performed by: FAMILY MEDICINE

## 2023-01-26 PROCEDURE — 83036 HEMOGLOBIN GLYCOSYLATED A1C: CPT | Performed by: FAMILY MEDICINE

## 2023-01-26 RX ORDER — OMEPRAZOLE 20 MG/1
CAPSULE, DELAYED RELEASE ORAL
Qty: 90 CAPSULE | Refills: 3 | Status: SHIPPED | OUTPATIENT
Start: 2023-01-26

## 2023-01-26 RX ORDER — LISINOPRIL AND HYDROCHLOROTHIAZIDE 12.5; 2 MG/1; MG/1
2 TABLET ORAL DAILY
Qty: 180 TABLET | Refills: 3 | Status: SHIPPED | OUTPATIENT
Start: 2023-01-26

## 2023-01-26 RX ORDER — LETROZOLE 2.5 MG/1
2.5 TABLET, FILM COATED ORAL DAILY
Qty: 90 TABLET | Refills: 1 | Status: SHIPPED | OUTPATIENT
Start: 2023-01-26

## 2023-01-26 RX ORDER — AMLODIPINE BESYLATE 10 MG/1
10 TABLET ORAL DAILY
Qty: 90 TABLET | Refills: 1 | Status: SHIPPED | OUTPATIENT
Start: 2023-01-26

## 2023-01-26 RX ORDER — ATORVASTATIN CALCIUM 40 MG/1
40 TABLET, FILM COATED ORAL DAILY
Qty: 90 TABLET | Refills: 1 | Status: SHIPPED | OUTPATIENT
Start: 2023-01-26

## 2023-01-26 NOTE — PROGRESS NOTES
[facility]  Dinora Nichols comes in for follow-up care. Hypertension: Patient has hypertension. Blood pressure is stable. Denies headache, changes in vision or focal weakness. Patient is on amlodipine and Prinzide. Needs a refill of medication. Prescription is sent in. We will check labs. Dyslipidemia: Patient has dyslipidemia. She takes Lipitor 40 mg daily. She will continue to exercise and take a diet low in polysaturated fats. We will recheck lipid panel. Breast cancer: Patient has a history of breast cancer. Has had bilateral mastectomy. She is on Femara. I will send in a refill of medication. GERD: Patient has gastroesophageal reflux disease. Gets heartburn on and off. She is on Prilosec. Needs a refill of medication. Prescription is sent in. She denies dark stools or hematemesis. Psoriasis: Patient has a history of psoriasis. Has psoriatic plaques on the elbows and the forearms. These are itchy and flaky. Has tried various over-the-counter medications without much relief. I will place a referral for her to see the dermatologist for management. Hyperglycemia: Patient has prediabetes. HbA1c is 5.7. She will intensify lifestyle and dietary modification. Morbid obesity: Patient has a BMI of 45.39. She will intensify lifestyle and dietary modification. Health maintenance: Patient declines to have any vaccination. We discussed the flu, PCV and tetanus vaccines. She declines these. We discussed colon cancer screening. Patient declines a colonoscopy. We discussed cervical cancer screening. Patient declines referral to gynecologist or having a Pap smear done.       Past Medical History  Past Medical History:   Diagnosis Date    Acid reflux     Adverse effect of anesthesia     sister has a lot of allergies and issues with anesthesia per pt    BPPV (benign paroxysmal positional vertigo)     Breast cancer ([facility] [address] 7/2014    right    Environmental allergies     Gallstones     Negative workup in 2009    GERD (gastroesophageal reflux disease)     HTN (hypertension) 2/24/2015    Hypertension 9/19/2013    Hypertension     Hypoactive thyroid     Hypothyroidism 2/24/2015    Mixed hyperlipidemia 5/12/2016    Morbid obesity ([address]     Prediabetes 5/12/2016       Surgical History  Past Surgical History:   Procedure Laterality Date    HX BREAST BIOPSY Right 12/7/2018    RIGHT BREAST SKIN EXCISIONAL BIOPSY, performed by Bobo Romeo MD at [address] Bilateral 9/24/2015    BREAST RECONSTRUCTION performed by Jelani Galan MD at [address] Right 11/10/2015    REMOVAL RIGHT BREAST TISSUE EXPANDER performed by Jelani Galan MD at [facility] MAIN OR    HX BREAST RECONSTRUCTION Right 2/19/2016    PLACEMENT RIGHT TISSUE EXPANDER  performed by Jelani Galan MD at [facility] MAIN OR    HX BREAST RECONSTRUCTION Bilateral 10/21/2016    EXCHANGE BILATERAL TISSUE EXPANDER FOR BREAST IMPLANT performed by Jelani Galan MD at [facility] MAIN OR    HX CHOLECYSTECTOMY      HX [id number] [facility]    Vaginal deliveries    HX LAP CHOLECYSTECTOMY      HX MASTECTOMY Bilateral 9/24/2015    BILATERAL BREAST SKIN SPARING MASTECTOMY, RIGHT SENTINEL NODE BIOPSY/BILATERAL BREAST RECONSTRUCTION W/TISSUE EXPANDERS AND ALLODERM performed by Corey Gutierrez MD at [facility] AMBULATORY OR    HX VASCULAR ACCESS  12/2018    upper left chest    PA ERCP REMOVE CALCULI/DEBRIS BILIARY/PANCREAS DUCT  3/25/2016         PA ERCP W/SPHINCTEROTOMY/PAPILLOTOMY  3/25/2016             Medications  Current Outpatient Medications   Medication Sig Dispense Refill    amLODIPine (NORVASC) 10 mg tablet Take 1 Tablet by mouth daily. 90 Tablet 1    atorvastatin (LIPITOR) 40 mg tablet Take 1 Tablet by mouth daily. 90 Tablet 1    letrozole (FEMARA) 2.5 mg tablet Take 1 Tablet by mouth daily. 90 Tablet 1    lisinopril-hydroCHLOROthiazide (PRINZIDE, ZESTORETIC) 20-12.5 mg per tablet Take 2 Tablets by mouth daily.  180 Tablet 3 omeprazole (PRILOSEC) 20 mg capsule take 1 capsule by mouth at bedtime 90 Capsule 3       Allergies  No Known Allergies    Family History  Family History   Problem Relation Age of Onset    Heart Disease Mother     Stroke Father     Asthma Sister     Hypertension Sister        Social History  Social History     Socioeconomic History    Marital status: SINGLE     Spouse name: Not on file    Number of children: Not on file    Years of education: Not on file    Highest education level: Not on file   Occupational History    Occupation:  of home [employer]   Tobacco Use    Smoking status: Every Day     Packs/day: 2.00     Years: 30.00     Pack years: 60.00     Types: Cigarettes    Smokeless tobacco: Never   Vaping Use    Vaping Use: Never used   Substance and Sexual Activity    Alcohol use: Yes     Alcohol/week: 7.0 standard drinks     Types: 7 Glasses of wine per week    Drug use: Never    Sexual activity: Not Currently     Partners: Male     Birth control/protection: None   Other Topics Concern    Not on file   Social History Narrative    ** Merged History Encounter **          Social Determinants of Health     Financial Resource Strain: Not on file   Food Insecurity: Not on file   Transportation Needs: Not on file   Physical Activity: Not on file   Stress: Not on file   Social Connections: Not on file   Intimate Partner Violence: Not on file   Housing Stability: Not on file       Review of Systems  Review of Systems - Review of all systems is negative except as noted above in the HPI.     Vital Signs  Visit Vitals  /77   Pulse 86   Temp 97.8 °F (36.6 °C) (Temporal)   Resp 20   Ht 5' 1\" (1.549 m)   Wt 240 lb 3.2 oz (109 kg)   SpO2 98%   BMI 45.39 kg/m²         Physical Exam  Physical Examination: General appearance - alert, well appearing, and in no distress, oriented to person, place, and time, overweight, and acyanotic, in no respiratory distress  Mental status - alert, oriented to person, place, and time  Neck - supple, no significant adenopathy  Lymphatics - no palpable lymphadenopathy, no hepatosplenomegaly  Chest - clear to auscultation, no wheezes, rales or rhonchi, symmetric air entry  Heart - S1 and S2 normal  Abdomen - no rebound tenderness noted  Back exam - limited range of motion  Neurological - normal muscle tone, no tremors, strength 5/5  Musculoskeletal - osteoarthritic changes noted in both hands  Extremities - no pedal edema noted, intact peripheral pulses  Skin - DERMATITIS NOTED: psoriasis on the upper extremities          Results  Results for orders placed or performed in visit on 01/26/23   AMB POC HEMOGLOBIN A1C   Result Value Ref Range    Hemoglobin A1c (POC) 5.7 %       ASSESSMENT and PLAN    ICD-10-CM ICD-9-CM    1. Essential hypertension  I10 401.9 amLODIPine (NORVASC) 10 mg tablet      lisinopril-hydroCHLOROthiazide (PRINZIDE, ZESTORETIC) 20-12.5 mg per tablet      2. Hyperglycemia  R73.9 790.29 AMB POC HEMOGLOBIN A1C      HEMOGLOBIN A1C WITH EAG      3. Malignant neoplasm of upper-inner quadrant of right breast in female, estrogen receptor positive (HCC)  C50.211 174.2 letrozole (FEMARA) 2.5 mg tablet    Z17.0 V86.0 LIPID PANEL      METABOLIC PANEL, COMPREHENSIVE      CBC WITH AUTOMATED DIFF      4. Gastroesophageal reflux disease  K21.9 530.81 omeprazole (PRILOSEC) 20 mg capsule      5. Psoriasis  L40.9 696.1 REFERRAL TO DERMATOLOGY      6. Morbid obesity with BMI of 45.0-49.9, adult (HCC)  E66.01 278.01     Z68.42 V85.42       7. Dyslipidemia  E78.5 272.4 atorvastatin (LIPITOR) 40 mg tablet        lab results and schedule of future lab studies reviewed with patient  reviewed diet, exercise and weight control  cardiovascular risk and specific lipid/LDL goals reviewed  reviewed medications and side effects in detail      I have discussed the diagnosis with the patient and the intended plan of care as seen in the above orders.  The patient has received an after-visit summary and questions were answered concerning future plans. I have discussed medication, side effects, and warnings with the patient in detail. The patient verbalized understanding and is in agreement with the plan of care. The patient will contact the office with any additional concerns. Augustin Eller MD    PLEASE NOTE:   This document has been produced using voice recognition software.  Unrecognized errors in transcription may be present

## 2023-01-26 NOTE — PROGRESS NOTES
1. \"Have you been to the ER, urgent care clinic since your last visit? Hospitalized since your last visit? \" No    2. \"Have you seen or consulted any other health care providers outside of the [address] [place] [address] since your last visit? \" No     3. For patients aged 39-70: Has the patient had a colonoscopy / FIT/ Cologuard? No      If the patient is female:    4. For patients aged 41-77: Has the patient had a mammogram within the past 2 years? NA - based on age or sex      11. For patients aged 21-65: Has the patient had a pap smear?  No

## 2023-07-26 RX ORDER — AMLODIPINE BESYLATE 10 MG/1
TABLET ORAL
Qty: 90 TABLET | Refills: 1 | Status: SHIPPED | OUTPATIENT
Start: 2023-07-26

## 2023-07-26 RX ORDER — LETROZOLE 2.5 MG/1
TABLET, FILM COATED ORAL
Qty: 90 TABLET | Refills: 1 | Status: SHIPPED | OUTPATIENT
Start: 2023-07-26

## 2023-07-26 RX ORDER — ATORVASTATIN CALCIUM 40 MG/1
TABLET, FILM COATED ORAL
Qty: 90 TABLET | Refills: 1 | Status: SHIPPED | OUTPATIENT
Start: 2023-07-26

## 2024-01-10 RX ORDER — LETROZOLE 2.5 MG/1
TABLET, FILM COATED ORAL
Qty: 90 TABLET | Refills: 1 | Status: SHIPPED | OUTPATIENT
Start: 2024-01-10

## 2024-01-10 RX ORDER — AMLODIPINE BESYLATE 10 MG/1
TABLET ORAL
Qty: 90 TABLET | Refills: 1 | Status: SHIPPED | OUTPATIENT
Start: 2024-01-10

## 2024-01-15 ENCOUNTER — OFFICE VISIT (OUTPATIENT)
Facility: CLINIC | Age: 64
End: 2024-01-15
Payer: COMMERCIAL

## 2024-01-15 VITALS
HEIGHT: 61 IN | TEMPERATURE: 97.6 F | BODY MASS INDEX: 45.69 KG/M2 | HEART RATE: 87 BPM | OXYGEN SATURATION: 97 % | DIASTOLIC BLOOD PRESSURE: 86 MMHG | SYSTOLIC BLOOD PRESSURE: 138 MMHG | RESPIRATION RATE: 18 BRPM | WEIGHT: 242 LBS

## 2024-01-15 DIAGNOSIS — K21.9 GASTRO-ESOPHAGEAL REFLUX DISEASE WITHOUT ESOPHAGITIS: ICD-10-CM

## 2024-01-15 DIAGNOSIS — R73.9 HYPERGLYCEMIA, UNSPECIFIED: ICD-10-CM

## 2024-01-15 DIAGNOSIS — E66.01 MORBID (SEVERE) OBESITY DUE TO EXCESS CALORIES (HCC): ICD-10-CM

## 2024-01-15 DIAGNOSIS — R21 RASH: ICD-10-CM

## 2024-01-15 DIAGNOSIS — L02.92 BOIL: ICD-10-CM

## 2024-01-15 DIAGNOSIS — I10 ESSENTIAL (PRIMARY) HYPERTENSION: Primary | ICD-10-CM

## 2024-01-15 DIAGNOSIS — Z17.0 ESTROGEN RECEPTOR POSITIVE STATUS (ER+): ICD-10-CM

## 2024-01-15 DIAGNOSIS — E78.5 HYPERLIPIDEMIA, UNSPECIFIED HYPERLIPIDEMIA TYPE: ICD-10-CM

## 2024-01-15 LAB
A/G RATIO: 2.1 RATIO (ref 1.1–2.6)
ALBUMIN SERPL-MCNC: 4.9 G/DL (ref 3.5–5)
ALP BLD-CCNC: 90 U/L (ref 40–120)
ALT SERPL-CCNC: 19 U/L (ref 5–40)
ANION GAP SERPL CALCULATED.3IONS-SCNC: 18 MMOL/L (ref 3–15)
AST SERPL-CCNC: 20 U/L (ref 10–37)
AVERAGE GLUCOSE: 128 MG/DL (ref 91–123)
BILIRUB SERPL-MCNC: 0.3 MG/DL (ref 0.2–1.2)
BUN BLDV-MCNC: 17 MG/DL (ref 6–22)
CALCIUM SERPL-MCNC: 10 MG/DL (ref 8.4–10.5)
CHLORIDE BLD-SCNC: 96 MMOL/L (ref 98–110)
CHOLESTEROL/HDL RATIO: 3.2 (ref 0–5)
CHOLESTEROL: 150 MG/DL (ref 110–200)
CO2: 24 MMOL/L (ref 20–32)
CREAT SERPL-MCNC: 0.7 MG/DL (ref 0.8–1.4)
GLOBULIN: 2.3 G/DL (ref 2–4)
GLOMERULAR FILTRATION RATE: >60 ML/MIN/1.73 SQ.M.
GLUCOSE: 111 MG/DL (ref 70–99)
HBA1C MFR BLD: 6.1 % (ref 4.8–5.6)
HCT VFR BLD CALC: 39 % (ref 35.1–48)
HDLC SERPL-MCNC: 47 MG/DL
HEMOGLOBIN: 13.5 G/DL (ref 11.7–16)
LDL CHOLESTEROL CALCULATED: 86 MG/DL (ref 50–99)
LDL/HDL RATIO: 1.8
MCH RBC QN AUTO: 33 PG (ref 26–34)
MCHC RBC AUTO-ENTMCNC: 35 G/DL (ref 31–36)
MCV RBC AUTO: 95 FL (ref 80–99)
NON-HDL CHOLESTEROL: 103 MG/DL
PDW BLD-RTO: 12.3 % (ref 10–15.5)
PLATELET # BLD: 358 K/UL (ref 140–440)
PMV BLD AUTO: 9.9 FL (ref 9–13)
POTASSIUM SERPL-SCNC: 3.9 MMOL/L (ref 3.5–5.5)
RBC: 4.12 M/UL (ref 3.8–5.2)
SODIUM BLD-SCNC: 138 MMOL/L (ref 133–145)
TOTAL PROTEIN: 7.2 G/DL (ref 6.2–8.1)
TRIGL SERPL-MCNC: 84 MG/DL (ref 40–149)
VLDLC SERPL CALC-MCNC: 17 MG/DL (ref 8–30)
WBC: 7.4 K/UL (ref 4–11)

## 2024-01-15 PROCEDURE — 3075F SYST BP GE 130 - 139MM HG: CPT | Performed by: FAMILY MEDICINE

## 2024-01-15 PROCEDURE — 99214 OFFICE O/P EST MOD 30 MIN: CPT | Performed by: FAMILY MEDICINE

## 2024-01-15 PROCEDURE — 3079F DIAST BP 80-89 MM HG: CPT | Performed by: FAMILY MEDICINE

## 2024-01-15 RX ORDER — ATORVASTATIN CALCIUM 40 MG/1
40 TABLET, FILM COATED ORAL DAILY
Qty: 90 TABLET | Refills: 1 | Status: SHIPPED | OUTPATIENT
Start: 2024-01-15

## 2024-01-15 RX ORDER — OMEPRAZOLE 20 MG/1
20 CAPSULE, DELAYED RELEASE ORAL NIGHTLY
Qty: 90 CAPSULE | Refills: 1 | Status: SHIPPED | OUTPATIENT
Start: 2024-01-15

## 2024-01-15 RX ORDER — CLINDAMYCIN HYDROCHLORIDE 300 MG/1
300 CAPSULE ORAL 3 TIMES DAILY
Qty: 21 CAPSULE | Refills: 0 | Status: SHIPPED | OUTPATIENT
Start: 2024-01-15 | End: 2024-01-22

## 2024-01-15 RX ORDER — LETROZOLE 2.5 MG/1
2.5 TABLET, FILM COATED ORAL DAILY
Qty: 90 TABLET | Refills: 1 | Status: SHIPPED | OUTPATIENT
Start: 2024-01-15

## 2024-01-15 RX ORDER — LISINOPRIL AND HYDROCHLOROTHIAZIDE 20; 12.5 MG/1; MG/1
2 TABLET ORAL DAILY
Qty: 180 TABLET | Refills: 1 | Status: SHIPPED | OUTPATIENT
Start: 2024-01-15

## 2024-01-15 RX ORDER — AMLODIPINE BESYLATE 10 MG/1
10 TABLET ORAL DAILY
Qty: 90 TABLET | Refills: 1 | Status: SHIPPED | OUTPATIENT
Start: 2024-01-15

## 2024-01-15 SDOH — ECONOMIC STABILITY: FOOD INSECURITY: WITHIN THE PAST 12 MONTHS, YOU WORRIED THAT YOUR FOOD WOULD RUN OUT BEFORE YOU GOT MONEY TO BUY MORE.: NEVER TRUE

## 2024-01-15 SDOH — ECONOMIC STABILITY: INCOME INSECURITY: HOW HARD IS IT FOR YOU TO PAY FOR THE VERY BASICS LIKE FOOD, HOUSING, MEDICAL CARE, AND HEATING?: NOT HARD AT ALL

## 2024-01-15 SDOH — ECONOMIC STABILITY: HOUSING INSECURITY
IN THE LAST 12 MONTHS, WAS THERE A TIME WHEN YOU DID NOT HAVE A STEADY PLACE TO SLEEP OR SLEPT IN A SHELTER (INCLUDING NOW)?: NO

## 2024-01-15 SDOH — ECONOMIC STABILITY: FOOD INSECURITY: WITHIN THE PAST 12 MONTHS, THE FOOD YOU BOUGHT JUST DIDN'T LAST AND YOU DIDN'T HAVE MONEY TO GET MORE.: NEVER TRUE

## 2024-01-15 ASSESSMENT — PATIENT HEALTH QUESTIONNAIRE - PHQ9
SUM OF ALL RESPONSES TO PHQ QUESTIONS 1-9: 0
SUM OF ALL RESPONSES TO PHQ QUESTIONS 1-9: 0
1. LITTLE INTEREST OR PLEASURE IN DOING THINGS: 0
SUM OF ALL RESPONSES TO PHQ9 QUESTIONS 1 & 2: 0
SUM OF ALL RESPONSES TO PHQ QUESTIONS 1-9: 0
SUM OF ALL RESPONSES TO PHQ QUESTIONS 1-9: 0
2. FEELING DOWN, DEPRESSED OR HOPELESS: 0

## 2024-01-15 NOTE — PROGRESS NOTES
[facility]  Sravani Shelton comes in for follow up care.  HTN: Patient has hypertension.  Blood pressure is stable.  Denies headache, changes in vision or focal weakness.  She is on amlodipine 10 mg daily and Prinzide 20/12.5 mg.  Patient will continue with the current treatment plan.  She will take a low-sodium diet.  I will send a refill of medications.  Boil: Patient has boil right chest wall.  This has been there for almost a week.  States that there is mild erythema and it was draining some purulent material.  She denies tenderness, fever or chills.  I will send in clindamycin to take.  Breast cancer: Patient has a history of right breast cancer.  She underwent bilateral mastectomy.  Currently patient is on letrozole.  I will send in a refill of medication.  She needs to follow-up with the breast specialist.  Hyperglycemia: Will check HbA1c.  Dyslipidemia: Patient has dyslipidemia.  Patient is on atorvastatin 40 mg daily.  He will exercise and take a diet low in polysaturated fats.  GERD: Patient has gastroesophageal reflux disease.  She gets heartburn on and off.  She is on Prilosec.  I will send in a refill of medication.  Rash: Patient has dermatitis rash.  This affects the palms of her hands, arms, forearms and lower extremities.  She has tried various topical medications without much relief.  After discussion I will refer her to the dermatologist.  We will follow-up with the recommendations.  Morbid obesity: Patient has morbid obesity.  BMI 45.73.  Patient will intensify lifestyle and dietary modification.      Past Medical History  Past Medical History:   Diagnosis Date    Acid reflux     Adverse effect of anesthesia     sister has a lot of allergies and issues with anesthesia per pt    BPPV (benign paroxysmal positional vertigo)     Breast cancer (HCC) 7/2014    right    Environmental allergies     Gallstones     Negative workup in 2009    GERD (gastroesophageal reflux disease)     HTN (hypertension) 2/24/2015

## 2024-07-16 DIAGNOSIS — Z17.0 ESTROGEN RECEPTOR POSITIVE STATUS (ER+): ICD-10-CM

## 2024-07-16 DIAGNOSIS — E78.5 HYPERLIPIDEMIA, UNSPECIFIED HYPERLIPIDEMIA TYPE: ICD-10-CM

## 2024-07-16 DIAGNOSIS — I10 ESSENTIAL (PRIMARY) HYPERTENSION: ICD-10-CM

## 2024-07-17 RX ORDER — OMEPRAZOLE 20 MG/1
CAPSULE, DELAYED RELEASE ORAL
Qty: 90 CAPSULE | Refills: 1 | Status: SHIPPED | OUTPATIENT
Start: 2024-07-17

## 2024-07-17 RX ORDER — ATORVASTATIN CALCIUM 40 MG/1
40 TABLET, FILM COATED ORAL DAILY
Qty: 90 TABLET | Refills: 1 | Status: SHIPPED | OUTPATIENT
Start: 2024-07-17

## 2024-07-17 RX ORDER — LETROZOLE 2.5 MG/1
2.5 TABLET, FILM COATED ORAL DAILY
Qty: 90 TABLET | Refills: 1 | Status: SHIPPED | OUTPATIENT
Start: 2024-07-17

## 2024-07-17 RX ORDER — LISINOPRIL AND HYDROCHLOROTHIAZIDE 20; 12.5 MG/1; MG/1
2 TABLET ORAL DAILY
Qty: 180 TABLET | Refills: 1 | Status: SHIPPED | OUTPATIENT
Start: 2024-07-17

## 2024-07-17 RX ORDER — AMLODIPINE BESYLATE 10 MG/1
10 TABLET ORAL DAILY
Qty: 90 TABLET | Refills: 1 | Status: SHIPPED | OUTPATIENT
Start: 2024-07-17

## 2024-09-11 ENCOUNTER — COMMUNITY OUTREACH (OUTPATIENT)
Facility: CLINIC | Age: 64
End: 2024-09-11

## 2025-01-09 DIAGNOSIS — I10 ESSENTIAL (PRIMARY) HYPERTENSION: ICD-10-CM

## 2025-01-09 DIAGNOSIS — E78.5 HYPERLIPIDEMIA, UNSPECIFIED HYPERLIPIDEMIA TYPE: ICD-10-CM

## 2025-01-10 NOTE — TELEPHONE ENCOUNTER
Last seen 1/15/2024   Last labs 01/15/2024  Last filled  07/17/2024  Next appointment 1/29/2025     Lab Results   Component Value Date     01/15/2024    K 3.9 01/15/2024    CL 96 (L) 01/15/2024    CO2 24 01/15/2024    BUN 17 01/15/2024    CREATININE 0.7 (L) 01/15/2024    GLUCOSE 111 (H) 01/15/2024    CALCIUM 10.0 01/15/2024    BILITOT 0.3 01/15/2024    ALKPHOS 90 01/15/2024    AST 20 01/15/2024    ALT 19 01/15/2024    LABGLOM >60.0 01/15/2024    GFRAA 67 12/09/2021    AGRATIO 2.1 01/15/2024    GLOB 2.3 01/15/2024

## 2025-01-12 RX ORDER — ATORVASTATIN CALCIUM 40 MG/1
40 TABLET, FILM COATED ORAL DAILY
Qty: 90 TABLET | Refills: 0 | Status: SHIPPED | OUTPATIENT
Start: 2025-01-12

## 2025-01-12 RX ORDER — LISINOPRIL AND HYDROCHLOROTHIAZIDE 12.5; 2 MG/1; MG/1
TABLET ORAL
Qty: 180 TABLET | Refills: 0 | Status: SHIPPED | OUTPATIENT
Start: 2025-01-12

## 2025-03-26 ENCOUNTER — OFFICE VISIT (OUTPATIENT)
Facility: CLINIC | Age: 65
End: 2025-03-26
Payer: COMMERCIAL

## 2025-03-26 VITALS
TEMPERATURE: 97.8 F | HEART RATE: 86 BPM | WEIGHT: 241 LBS | HEIGHT: 61 IN | BODY MASS INDEX: 45.5 KG/M2 | OXYGEN SATURATION: 98 % | DIASTOLIC BLOOD PRESSURE: 79 MMHG | SYSTOLIC BLOOD PRESSURE: 138 MMHG | RESPIRATION RATE: 20 BRPM

## 2025-03-26 DIAGNOSIS — L40.9 PSORIASIS: ICD-10-CM

## 2025-03-26 DIAGNOSIS — C50.211 MALIGNANT NEOPLASM OF UPPER-INNER QUADRANT OF RIGHT BREAST IN FEMALE, ESTROGEN RECEPTOR POSITIVE: ICD-10-CM

## 2025-03-26 DIAGNOSIS — Z11.1 SCREENING-PULMONARY TB: ICD-10-CM

## 2025-03-26 DIAGNOSIS — R73.9 HYPERGLYCEMIA, UNSPECIFIED: ICD-10-CM

## 2025-03-26 DIAGNOSIS — E78.5 HYPERLIPIDEMIA, UNSPECIFIED HYPERLIPIDEMIA TYPE: ICD-10-CM

## 2025-03-26 DIAGNOSIS — K21.9 GASTRO-ESOPHAGEAL REFLUX DISEASE WITHOUT ESOPHAGITIS: ICD-10-CM

## 2025-03-26 DIAGNOSIS — I10 ESSENTIAL (PRIMARY) HYPERTENSION: ICD-10-CM

## 2025-03-26 DIAGNOSIS — Z17.0 MALIGNANT NEOPLASM OF UPPER-INNER QUADRANT OF RIGHT BREAST IN FEMALE, ESTROGEN RECEPTOR POSITIVE: ICD-10-CM

## 2025-03-26 DIAGNOSIS — I10 ESSENTIAL (PRIMARY) HYPERTENSION: Primary | ICD-10-CM

## 2025-03-26 LAB — HBA1C MFR BLD: 6 %

## 2025-03-26 PROCEDURE — 99214 OFFICE O/P EST MOD 30 MIN: CPT | Performed by: FAMILY MEDICINE

## 2025-03-26 PROCEDURE — 83036 HEMOGLOBIN GLYCOSYLATED A1C: CPT | Performed by: FAMILY MEDICINE

## 2025-03-26 PROCEDURE — 3075F SYST BP GE 130 - 139MM HG: CPT | Performed by: FAMILY MEDICINE

## 2025-03-26 PROCEDURE — 3078F DIAST BP <80 MM HG: CPT | Performed by: FAMILY MEDICINE

## 2025-03-26 RX ORDER — RISANKIZUMAB-RZAA 150 MG/ML
INJECTION SUBCUTANEOUS
COMMUNITY
Start: 2025-02-12

## 2025-03-26 RX ORDER — ATORVASTATIN CALCIUM 40 MG/1
40 TABLET, FILM COATED ORAL DAILY
Qty: 90 TABLET | Refills: 2 | Status: SHIPPED | OUTPATIENT
Start: 2025-03-26

## 2025-03-26 RX ORDER — AMLODIPINE BESYLATE 10 MG/1
10 TABLET ORAL DAILY
Qty: 90 TABLET | Refills: 2 | Status: SHIPPED | OUTPATIENT
Start: 2025-03-26

## 2025-03-26 RX ORDER — OMEPRAZOLE 20 MG/1
20 CAPSULE, DELAYED RELEASE ORAL DAILY
Qty: 90 CAPSULE | Refills: 2 | Status: SHIPPED | OUTPATIENT
Start: 2025-03-26

## 2025-03-26 RX ORDER — LISINOPRIL AND HYDROCHLOROTHIAZIDE 12.5; 2 MG/1; MG/1
2 TABLET ORAL DAILY
Qty: 180 TABLET | Refills: 2 | Status: SHIPPED | OUTPATIENT
Start: 2025-03-26

## 2025-03-26 SDOH — ECONOMIC STABILITY: FOOD INSECURITY: WITHIN THE PAST 12 MONTHS, YOU WORRIED THAT YOUR FOOD WOULD RUN OUT BEFORE YOU GOT MONEY TO BUY MORE.: NEVER TRUE

## 2025-03-26 SDOH — ECONOMIC STABILITY: FOOD INSECURITY: WITHIN THE PAST 12 MONTHS, THE FOOD YOU BOUGHT JUST DIDN'T LAST AND YOU DIDN'T HAVE MONEY TO GET MORE.: NEVER TRUE

## 2025-03-26 ASSESSMENT — PATIENT HEALTH QUESTIONNAIRE - PHQ9
SUM OF ALL RESPONSES TO PHQ QUESTIONS 1-9: 0
1. LITTLE INTEREST OR PLEASURE IN DOING THINGS: NOT AT ALL
2. FEELING DOWN, DEPRESSED OR HOPELESS: NOT AT ALL

## 2025-03-26 NOTE — PROGRESS NOTES
[facility]  Sravani Shelton comes in for follow up care and general medical exam.  General medical exam: Patient comes in for complete physical exam.  Will check labs.  I will follow-up with the results.  Psoriasis: Patient has psoriasis.  She is seen by the dermatologist.  Psoriasis affects her trunk, hands and lower extremities.  She is on skyrizi.  Has been on this medication for few months.  She needs to have QuantiFERON test done to screen for TB.  Request is placed.    HTN: Patient has hypertension.  Blood pressure is stable.  Denies headache, changes in vision or focal weakness.  Patient is on Prinzide 20-12.5 mg 2 tablets daily, amlodipine 10 mg daily.  She has been stable on these medications.  Will recheck labs.  She will take a low-sodium diet.  GERD: Patient has gastroesophageal reflux disease.  She gets heartburn on and off.  She is on Prilosec 20 mg daily.  I will send in a refill of medication.  Dyslipidemia: Patient has dyslipidemia.  She is on atorvastatin 40 mg daily.  She will exercise and take a diet low in polysaturated fats.  I will send in a refill of medication.  Will recheck lipid panel.  Breast cancer: Patient has a history of right breast cancer.  She had mastectomy done and later had breast reconstruction.  She was followed up by the breast specialist and has completed letrozole.  Currently stable.  Hyperglycemia/prediabetes: Patient has an HbA1c of 6.0.  She will intensify lifestyle and dietary modification.  He will check microalbumin.  Health maintenance: Patient declines to have screening colonoscopy.  He declines to have Pap smear to screen for cervical cancer.      Past Medical History  Past Medical History:   Diagnosis Date    Acid reflux     Adverse effect of anesthesia     sister has a lot of allergies and issues with anesthesia per pt    BPPV (benign paroxysmal positional vertigo)     Breast cancer (HCC) 7/2014    right    Environmental allergies     Gallstones     Negative workup in

## 2025-03-26 NOTE — PROGRESS NOTES
\"Have you been to the ER, urgent care clinic since your last visit?  Hospitalized since your last visit?\"    NO    “Have you seen or consulted any other health care providers outside our system since your last visit?”    NO     “Have you had a pap smear?”    NO    No cervical cancer screening on file       “Have you had a colorectal cancer screening such as a colonoscopy/FIT/Cologuard?    NO  refused    No colonoscopy on file  No cologuard on file  No FIT/FOBT on file   No flexible sigmoidoscopy on file

## 2025-03-28 LAB
CREATININE, URINE  MG/DL: 87 MG/DL
MICROALBUMIN/CREAT 24H UR: <12 MG/L (ref 0.1–17)
MICROALBUMIN/CREAT UR-RTO: NORMAL

## (undated) DEVICE — APPLICATOR BNDG 1MM ADH PREMIERPRO EXOFIN

## (undated) DEVICE — CHEST/BREAST-LF: Brand: MEDLINE INDUSTRIES, INC.

## (undated) DEVICE — (D)PREP SKN CHLRAPRP APPL 26ML -- CONVERT TO ITEM 371833

## (undated) DEVICE — SUTURE VCRL SZ 3-0 L27IN ABSRB UD L26MM SH 1/2 CIR J416H

## (undated) DEVICE — DRAPE,LAPAROTOMY,T,PEDI,STERILE: Brand: MEDLINE

## (undated) DEVICE — 3M™ TEGADERM™ TRANSPARENT FILM DRESSING FRAME STYLE, 1626W, 4 IN X 4-3/4 IN (10 CM X 12 CM), 50/CT 4CT/CASE: Brand: 3M™ TEGADERM™

## (undated) DEVICE — KENDALL SCD EXPRESS SLEEVES, KNEE LENGTH, MEDIUM: Brand: KENDALL SCD

## (undated) DEVICE — GOWN,SIRUS,FABRNF,XL,20/CS: Brand: MEDLINE

## (undated) DEVICE — 3M™ TEGADERM™ TRANSPARENT FILM DRESSING FRAME STYLE, 1624W, 2-3/8 IN X 2-3/4 IN (6 CM X 7 CM), 100/CT 4CT/CASE: Brand: 3M™ TEGADERM™

## (undated) DEVICE — LIGHT HANDLE: Brand: DEVON

## (undated) DEVICE — Device

## (undated) DEVICE — SYR 10ML LUER LOK 1/5ML GRAD --

## (undated) DEVICE — ROCKER SWITCH PENCIL BLADE ELECTRODE, HOLSTER: Brand: EDGE

## (undated) DEVICE — CONTINU-FLO SOLUTION SET, 2 INJECTION SITES, MALE LUER LOCK ADAPTER WITH RETRACTABLE COLLAR, LARGE BORE STOPCOCK WITH ROTATING MALE LUER LOCK EXTENSION SET, 2 INJECTION SITES, MALE LUER LOCK ADAPTER WITH RETRACTABLE COLLAR: Brand: INTERLINK/CONTINU-FLO

## (undated) DEVICE — SOLUTION IV 1000ML 0.9% SOD CHL

## (undated) DEVICE — INSULATED BLADE ELECTRODE: Brand: EDGE

## (undated) DEVICE — INFECTION CONTROL KIT SYS

## (undated) DEVICE — SMOKE EVACUATION PENCIL: Brand: VALLEYLAB

## (undated) DEVICE — INTENDED FOR TISSUE SEPARATION, AND OTHER PROCEDURES THAT REQUIRE A SHARP SURGICAL BLADE TO PUNCTURE OR CUT.: Brand: BARD-PARKER ® CARBON RIB-BACK BLADES

## (undated) DEVICE — GOWN,SIRUS,NONRNF,SETINSLV,XL,20/CS: Brand: MEDLINE

## (undated) DEVICE — HANDLE LT SNAP ON ULT DURABLE LENS FOR TRUMPF ALC DISPOSABLE

## (undated) DEVICE — NEEDLE HYPO 25GA L1.5IN BVL ORIENTED ECLIPSE

## (undated) DEVICE — SOLUTION LACTATED RINGERS INJECTION USP

## (undated) DEVICE — KENDALL DL ECG CABLE AND LEAD WIRE SYSTEM, 3-LEAD, SINGLE PATIENT USE: Brand: KENDALL

## (undated) DEVICE — DEVON™ KNEE AND BODY STRAP 60" X 3" (1.5 M X 7.6 CM): Brand: DEVON

## (undated) DEVICE — DRAPE PRB US TRNSDCR 6X96IN --

## (undated) DEVICE — MEDI-VAC NON-CONDUCTIVE SUCTION TUBING: Brand: CARDINAL HEALTH

## (undated) DEVICE — SYR IRR BLB 2OZ DISP BLU STRL -- CONVERT TO ITEM 357637

## (undated) DEVICE — SUTURE VCRL SZ 2-0 L27IN ABSRB UD L26MM SH 1/2 CIR J417H

## (undated) DEVICE — SUTURE MCRYL SZ 4-0 L27IN ABSRB UD L19MM PS-2 1/2 CIR PRIM Y426H

## (undated) DEVICE — SUT SLK 2-0SH 30IN BLK --

## (undated) DEVICE — SUTURE PROL SZ 2-0 L36IN NONABSORBABLE BLU SH L26MM 1/2 CIR 8523H

## (undated) DEVICE — SOLUTION IV 500ML 0.9% SOD CHL FLX CONT

## (undated) DEVICE — 1010 S-DRAPE TOWEL DRAPE 10/BX: Brand: STERI-DRAPE™

## (undated) DEVICE — KENDALL DL ECG CABLE AND LEAD WIRE SYSTEM, 5-LEAD, SINGLE PATIENT USE: Brand: KENDALL

## (undated) DEVICE — REM POLYHESIVE ADULT PATIENT RETURN ELECTRODE: Brand: VALLEYLAB

## (undated) DEVICE — ELECTRODE NDL TOT L2.13IN EXPOSED L3CM ACT L3MM TIP

## (undated) DEVICE — TOWEL SURG W17XL27IN STD BLU COT NONFENESTRATED PREWASHED

## (undated) DEVICE — NEEDLE HYPO 18GA L1.5IN PNK S STL HUB POLYPR SHLD REG BVL

## (undated) DEVICE — STERILE POLYISOPRENE POWDER-FREE SURGICAL GLOVES: Brand: PROTEXIS

## (undated) DEVICE — ICE PK EYE 4 1/2INX10IN (15/BX 2BX/CS

## (undated) DEVICE — STERILE POLYISOPRENE POWDER-FREE SURGICAL GLOVES WITH EMOLLIENT COATING: Brand: PROTEXIS

## (undated) DEVICE — TIP SUCT BLU PLAS BLB W/O CTRL VENT YANK

## (undated) DEVICE — NON-ADHERENT DRESSING: Brand: TELFA

## (undated) DEVICE — FRAZIER SUCTION INSTRUMENT 7 FR W/CONTROL VENT & OBTURATOR: Brand: FRAZIER

## (undated) DEVICE — SOL IRRIGATION INJ NACL 0.9% 500ML BTL

## (undated) DEVICE — DRAPE XR C ARM 41X74IN LF --